# Patient Record
Sex: FEMALE | Race: WHITE | Employment: FULL TIME | URBAN - METROPOLITAN AREA
[De-identification: names, ages, dates, MRNs, and addresses within clinical notes are randomized per-mention and may not be internally consistent; named-entity substitution may affect disease eponyms.]

---

## 2017-12-23 ENCOUNTER — HOSPITAL ENCOUNTER (INPATIENT)
Age: 37
LOS: 5 days | Discharge: NURSING, REHAB OR LONG-TERM CARE | DRG: 030 | End: 2017-12-29
Attending: EMERGENCY MEDICINE | Admitting: NEUROLOGICAL SURGERY
Payer: COMMERCIAL

## 2017-12-23 ENCOUNTER — APPOINTMENT (OUTPATIENT)
Dept: MRI IMAGING | Age: 37
DRG: 030 | End: 2017-12-23
Payer: COMMERCIAL

## 2017-12-23 ENCOUNTER — APPOINTMENT (OUTPATIENT)
Dept: GENERAL RADIOLOGY | Age: 37
DRG: 030 | End: 2017-12-23
Attending: NEUROLOGICAL SURGERY
Payer: COMMERCIAL

## 2017-12-23 ENCOUNTER — ANESTHESIA (OUTPATIENT)
Dept: SURGERY | Age: 37
DRG: 030 | End: 2017-12-23
Payer: COMMERCIAL

## 2017-12-23 ENCOUNTER — ANESTHESIA EVENT (OUTPATIENT)
Dept: SURGERY | Age: 37
DRG: 030 | End: 2017-12-23
Payer: COMMERCIAL

## 2017-12-23 DIAGNOSIS — K59.2 NEUROGENIC BOWEL: ICD-10-CM

## 2017-12-23 DIAGNOSIS — G83.4 CAUDA EQUINA SYNDROME (HCC): ICD-10-CM

## 2017-12-23 DIAGNOSIS — M51.16 LUMBAR DISC HERNIATION WITH RADICULOPATHY: Primary | ICD-10-CM

## 2017-12-23 DIAGNOSIS — G83.4 CAUDA EQUINA SYNDROME WITH NEUROGENIC BLADDER (HCC): ICD-10-CM

## 2017-12-23 LAB — HCG UR QL: NEGATIVE

## 2017-12-23 PROCEDURE — 74011000258 HC RX REV CODE- 258: Performed by: NEUROLOGICAL SURGERY

## 2017-12-23 PROCEDURE — 77030004391 HC BUR FLUT MEDT -C: Performed by: NEUROLOGICAL SURGERY

## 2017-12-23 PROCEDURE — 77030030163 HC BN WAX J&J -A: Performed by: NEUROLOGICAL SURGERY

## 2017-12-23 PROCEDURE — 74011250636 HC RX REV CODE- 250/636: Performed by: ANESTHESIOLOGY

## 2017-12-23 PROCEDURE — 77030018836 HC SOL IRR NACL ICUM -A: Performed by: NEUROLOGICAL SURGERY

## 2017-12-23 PROCEDURE — 0SB20ZZ EXCISION OF LUMBAR VERTEBRAL DISC, OPEN APPROACH: ICD-10-PCS | Performed by: NEUROLOGICAL SURGERY

## 2017-12-23 PROCEDURE — 77030026224 HC KNF DSCTMY MTRX MEDT -D: Performed by: NEUROLOGICAL SURGERY

## 2017-12-23 PROCEDURE — 74011250636 HC RX REV CODE- 250/636

## 2017-12-23 PROCEDURE — 81025 URINE PREGNANCY TEST: CPT

## 2017-12-23 PROCEDURE — 77030008703 HC TU ET UNCUF COVD -A: Performed by: ANESTHESIOLOGY

## 2017-12-23 PROCEDURE — 74011250637 HC RX REV CODE- 250/637: Performed by: NEUROLOGICAL SURGERY

## 2017-12-23 PROCEDURE — 76060000034 HC ANESTHESIA 1.5 TO 2 HR: Performed by: NEUROLOGICAL SURGERY

## 2017-12-23 PROCEDURE — 76010000161 HC OR TIME 1 TO 1.5 HR INTENSV-TIER 1: Performed by: NEUROLOGICAL SURGERY

## 2017-12-23 PROCEDURE — 74011000250 HC RX REV CODE- 250: Performed by: NEUROLOGICAL SURGERY

## 2017-12-23 PROCEDURE — 74011250636 HC RX REV CODE- 250/636: Performed by: PHYSICIAN ASSISTANT

## 2017-12-23 PROCEDURE — 77030027138 HC INCENT SPIROMETER -A

## 2017-12-23 PROCEDURE — 77030003666 HC NDL SPINAL BD -A: Performed by: NEUROLOGICAL SURGERY

## 2017-12-23 PROCEDURE — 96374 THER/PROPH/DIAG INJ IV PUSH: CPT | Performed by: PHYSICIAN ASSISTANT

## 2017-12-23 PROCEDURE — 77030010507 HC ADH SKN DERMBND J&J -B: Performed by: NEUROLOGICAL SURGERY

## 2017-12-23 PROCEDURE — 74011000250 HC RX REV CODE- 250

## 2017-12-23 PROCEDURE — 72148 MRI LUMBAR SPINE W/O DYE: CPT

## 2017-12-23 PROCEDURE — 77030011640 HC PAD GRND REM COVD -A: Performed by: NEUROLOGICAL SURGERY

## 2017-12-23 PROCEDURE — 74011000272 HC RX REV CODE- 272: Performed by: NEUROLOGICAL SURGERY

## 2017-12-23 PROCEDURE — 99285 EMERGENCY DEPT VISIT HI MDM: CPT | Performed by: PHYSICIAN ASSISTANT

## 2017-12-23 PROCEDURE — 74011250636 HC RX REV CODE- 250/636: Performed by: NEUROLOGICAL SURGERY

## 2017-12-23 PROCEDURE — 77030008477 HC STYL SATN SLP COVD -A: Performed by: ANESTHESIOLOGY

## 2017-12-23 PROCEDURE — 77030032490 HC SLV COMPR SCD KNE COVD -B: Performed by: NEUROLOGICAL SURGERY

## 2017-12-23 PROCEDURE — 76210000006 HC OR PH I REC 0.5 TO 1 HR: Performed by: NEUROLOGICAL SURGERY

## 2017-12-23 PROCEDURE — 77030003029 HC SUT VCRL J&J -B: Performed by: NEUROLOGICAL SURGERY

## 2017-12-23 RX ORDER — MORPHINE SULFATE 10 MG/ML
INJECTION, SOLUTION INTRAMUSCULAR; INTRAVENOUS AS NEEDED
Status: DISCONTINUED | OUTPATIENT
Start: 2017-12-23 | End: 2017-12-23 | Stop reason: HOSPADM

## 2017-12-23 RX ORDER — DEXTROSE, SODIUM CHLORIDE, AND POTASSIUM CHLORIDE 5; .45; .15 G/100ML; G/100ML; G/100ML
75 INJECTION INTRAVENOUS CONTINUOUS
Status: DISCONTINUED | OUTPATIENT
Start: 2017-12-23 | End: 2017-12-29 | Stop reason: HOSPADM

## 2017-12-23 RX ORDER — PROPOFOL 10 MG/ML
INJECTION, EMULSION INTRAVENOUS AS NEEDED
Status: DISCONTINUED | OUTPATIENT
Start: 2017-12-23 | End: 2017-12-23 | Stop reason: HOSPADM

## 2017-12-23 RX ORDER — SODIUM CHLORIDE 0.9 % (FLUSH) 0.9 %
5-10 SYRINGE (ML) INJECTION EVERY 8 HOURS
Status: DISCONTINUED | OUTPATIENT
Start: 2017-12-23 | End: 2017-12-29 | Stop reason: HOSPADM

## 2017-12-23 RX ORDER — MIDAZOLAM HYDROCHLORIDE 1 MG/ML
2 INJECTION, SOLUTION INTRAMUSCULAR; INTRAVENOUS ONCE
Status: COMPLETED | OUTPATIENT
Start: 2017-12-23 | End: 2017-12-23

## 2017-12-23 RX ORDER — LIDOCAINE HYDROCHLORIDE 20 MG/ML
INJECTION, SOLUTION EPIDURAL; INFILTRATION; INTRACAUDAL; PERINEURAL AS NEEDED
Status: DISCONTINUED | OUTPATIENT
Start: 2017-12-23 | End: 2017-12-23 | Stop reason: HOSPADM

## 2017-12-23 RX ORDER — MORPHINE SULFATE 10 MG/ML
10 INJECTION, SOLUTION INTRAMUSCULAR; INTRAVENOUS
Status: DISCONTINUED | OUTPATIENT
Start: 2017-12-23 | End: 2017-12-29 | Stop reason: HOSPADM

## 2017-12-23 RX ORDER — CLONIDINE HYDROCHLORIDE 0.1 MG/1
0.1 TABLET ORAL
Status: DISCONTINUED | OUTPATIENT
Start: 2017-12-23 | End: 2017-12-29 | Stop reason: HOSPADM

## 2017-12-23 RX ORDER — DIAZEPAM 10 MG/2ML
5 INJECTION INTRAMUSCULAR
Status: COMPLETED | OUTPATIENT
Start: 2017-12-23 | End: 2017-12-23

## 2017-12-23 RX ORDER — SODIUM CHLORIDE 0.9 % (FLUSH) 0.9 %
5-10 SYRINGE (ML) INJECTION AS NEEDED
Status: DISCONTINUED | OUTPATIENT
Start: 2017-12-23 | End: 2017-12-23 | Stop reason: HOSPADM

## 2017-12-23 RX ORDER — DULOXETIN HYDROCHLORIDE 60 MG/1
60 CAPSULE, DELAYED RELEASE ORAL DAILY
COMMUNITY

## 2017-12-23 RX ORDER — GLUCOSAMINE/CHONDR SU A SOD 750-600 MG
TABLET ORAL
COMMUNITY

## 2017-12-23 RX ORDER — ONDANSETRON 2 MG/ML
INJECTION INTRAMUSCULAR; INTRAVENOUS AS NEEDED
Status: DISCONTINUED | OUTPATIENT
Start: 2017-12-23 | End: 2017-12-23 | Stop reason: HOSPADM

## 2017-12-23 RX ORDER — HEPARIN SODIUM 5000 [USP'U]/ML
5000 INJECTION, SOLUTION INTRAVENOUS; SUBCUTANEOUS EVERY 12 HOURS
Status: DISCONTINUED | OUTPATIENT
Start: 2017-12-23 | End: 2017-12-29 | Stop reason: HOSPADM

## 2017-12-23 RX ORDER — DULOXETIN HYDROCHLORIDE 60 MG/1
60 CAPSULE, DELAYED RELEASE ORAL DAILY
Status: DISCONTINUED | OUTPATIENT
Start: 2017-12-24 | End: 2017-12-23 | Stop reason: SDUPTHER

## 2017-12-23 RX ORDER — FAMOTIDINE 20 MG/1
20 TABLET, FILM COATED ORAL EVERY 12 HOURS
Status: DISCONTINUED | OUTPATIENT
Start: 2017-12-23 | End: 2017-12-29 | Stop reason: HOSPADM

## 2017-12-23 RX ORDER — ROCURONIUM BROMIDE 10 MG/ML
INJECTION, SOLUTION INTRAVENOUS AS NEEDED
Status: DISCONTINUED | OUTPATIENT
Start: 2017-12-23 | End: 2017-12-23 | Stop reason: HOSPADM

## 2017-12-23 RX ORDER — LIDOCAINE HYDROCHLORIDE AND EPINEPHRINE 10; 10 MG/ML; UG/ML
INJECTION, SOLUTION INFILTRATION; PERINEURAL AS NEEDED
Status: DISCONTINUED | OUTPATIENT
Start: 2017-12-23 | End: 2017-12-23 | Stop reason: HOSPADM

## 2017-12-23 RX ORDER — OXYCODONE AND ACETAMINOPHEN 10; 325 MG/1; MG/1
1 TABLET ORAL AS NEEDED
Status: DISCONTINUED | OUTPATIENT
Start: 2017-12-23 | End: 2017-12-23 | Stop reason: HOSPADM

## 2017-12-23 RX ORDER — FENTANYL CITRATE 50 UG/ML
INJECTION, SOLUTION INTRAMUSCULAR; INTRAVENOUS AS NEEDED
Status: DISCONTINUED | OUTPATIENT
Start: 2017-12-23 | End: 2017-12-23 | Stop reason: HOSPADM

## 2017-12-23 RX ORDER — SODIUM CHLORIDE, SODIUM LACTATE, POTASSIUM CHLORIDE, CALCIUM CHLORIDE 600; 310; 30; 20 MG/100ML; MG/100ML; MG/100ML; MG/100ML
75 INJECTION, SOLUTION INTRAVENOUS CONTINUOUS
Status: DISCONTINUED | OUTPATIENT
Start: 2017-12-23 | End: 2017-12-23 | Stop reason: HOSPADM

## 2017-12-23 RX ORDER — ONDANSETRON 2 MG/ML
4 INJECTION INTRAMUSCULAR; INTRAVENOUS
Status: DISCONTINUED | OUTPATIENT
Start: 2017-12-23 | End: 2017-12-29 | Stop reason: HOSPADM

## 2017-12-23 RX ORDER — DEXAMETHASONE SODIUM PHOSPHATE 4 MG/ML
INJECTION, SOLUTION INTRA-ARTICULAR; INTRALESIONAL; INTRAMUSCULAR; INTRAVENOUS; SOFT TISSUE AS NEEDED
Status: DISCONTINUED | OUTPATIENT
Start: 2017-12-23 | End: 2017-12-23 | Stop reason: HOSPADM

## 2017-12-23 RX ORDER — HYDROMORPHONE HYDROCHLORIDE 2 MG/ML
0.5 INJECTION, SOLUTION INTRAMUSCULAR; INTRAVENOUS; SUBCUTANEOUS
Status: DISCONTINUED | OUTPATIENT
Start: 2017-12-23 | End: 2017-12-23 | Stop reason: HOSPADM

## 2017-12-23 RX ORDER — ACETAMINOPHEN 325 MG/1
650 TABLET ORAL
Status: DISCONTINUED | OUTPATIENT
Start: 2017-12-23 | End: 2017-12-29 | Stop reason: HOSPADM

## 2017-12-23 RX ORDER — AMOXICILLIN 250 MG
2 CAPSULE ORAL DAILY
Status: DISCONTINUED | OUTPATIENT
Start: 2017-12-24 | End: 2017-12-29 | Stop reason: HOSPADM

## 2017-12-23 RX ORDER — HYDROCODONE BITARTRATE AND ACETAMINOPHEN 10; 325 MG/1; MG/1
1 TABLET ORAL
COMMUNITY

## 2017-12-23 RX ORDER — ASCORBIC ACID 250 MG
TABLET ORAL
COMMUNITY

## 2017-12-23 RX ORDER — OXYCODONE HYDROCHLORIDE 5 MG/1
5 TABLET ORAL
Status: DISCONTINUED | OUTPATIENT
Start: 2017-12-23 | End: 2017-12-23 | Stop reason: HOSPADM

## 2017-12-23 RX ORDER — NALOXONE HYDROCHLORIDE 0.4 MG/ML
0.4 INJECTION, SOLUTION INTRAMUSCULAR; INTRAVENOUS; SUBCUTANEOUS AS NEEDED
Status: DISCONTINUED | OUTPATIENT
Start: 2017-12-23 | End: 2017-12-29 | Stop reason: HOSPADM

## 2017-12-23 RX ORDER — HYDROCODONE BITARTRATE AND ACETAMINOPHEN 10; 325 MG/1; MG/1
1 TABLET ORAL
Status: DISCONTINUED | OUTPATIENT
Start: 2017-12-23 | End: 2017-12-29 | Stop reason: HOSPADM

## 2017-12-23 RX ORDER — SODIUM CHLORIDE 0.9 % (FLUSH) 0.9 %
5-10 SYRINGE (ML) INJECTION AS NEEDED
Status: DISCONTINUED | OUTPATIENT
Start: 2017-12-23 | End: 2017-12-29 | Stop reason: HOSPADM

## 2017-12-23 RX ORDER — NEOSTIGMINE METHYLSULFATE 1 MG/ML
INJECTION INTRAVENOUS AS NEEDED
Status: DISCONTINUED | OUTPATIENT
Start: 2017-12-23 | End: 2017-12-23 | Stop reason: HOSPADM

## 2017-12-23 RX ORDER — SUCCINYLCHOLINE CHLORIDE 20 MG/ML
INJECTION INTRAMUSCULAR; INTRAVENOUS AS NEEDED
Status: DISCONTINUED | OUTPATIENT
Start: 2017-12-23 | End: 2017-12-23 | Stop reason: HOSPADM

## 2017-12-23 RX ORDER — GLYCOPYRROLATE 0.2 MG/ML
INJECTION INTRAMUSCULAR; INTRAVENOUS AS NEEDED
Status: DISCONTINUED | OUTPATIENT
Start: 2017-12-23 | End: 2017-12-23 | Stop reason: HOSPADM

## 2017-12-23 RX ORDER — DULOXETIN HYDROCHLORIDE 60 MG/1
60 CAPSULE, DELAYED RELEASE ORAL
Status: DISCONTINUED | OUTPATIENT
Start: 2017-12-23 | End: 2017-12-29 | Stop reason: HOSPADM

## 2017-12-23 RX ADMIN — FENTANYL CITRATE 100 MCG: 50 INJECTION, SOLUTION INTRAMUSCULAR; INTRAVENOUS at 16:44

## 2017-12-23 RX ADMIN — SODIUM CHLORIDE, SODIUM LACTATE, POTASSIUM CHLORIDE, AND CALCIUM CHLORIDE 75 ML/HR: 600; 310; 30; 20 INJECTION, SOLUTION INTRAVENOUS at 16:24

## 2017-12-23 RX ADMIN — GLYCOPYRROLATE 0.3 MG: 0.2 INJECTION INTRAMUSCULAR; INTRAVENOUS at 17:49

## 2017-12-23 RX ADMIN — ROCURONIUM BROMIDE 5 MG: 10 INJECTION, SOLUTION INTRAVENOUS at 16:30

## 2017-12-23 RX ADMIN — MIDAZOLAM HYDROCHLORIDE 2 MG: 1 INJECTION, SOLUTION INTRAMUSCULAR; INTRAVENOUS at 16:35

## 2017-12-23 RX ADMIN — SUCCINYLCHOLINE CHLORIDE 160 MG: 20 INJECTION INTRAMUSCULAR; INTRAVENOUS at 16:44

## 2017-12-23 RX ADMIN — SODIUM CHLORIDE 1000 ML: 900 INJECTION, SOLUTION INTRAVENOUS at 11:36

## 2017-12-23 RX ADMIN — PROPOFOL 200 MG: 10 INJECTION, EMULSION INTRAVENOUS at 16:44

## 2017-12-23 RX ADMIN — DIAZEPAM 5 MG: 5 INJECTION, SOLUTION INTRAMUSCULAR; INTRAVENOUS at 11:36

## 2017-12-23 RX ADMIN — DULOXETINE HYDROCHLORIDE 60 MG: 60 CAPSULE, DELAYED RELEASE ORAL at 22:44

## 2017-12-23 RX ADMIN — ROCURONIUM BROMIDE 30 MG: 10 INJECTION, SOLUTION INTRAVENOUS at 16:52

## 2017-12-23 RX ADMIN — DEXAMETHASONE SODIUM PHOSPHATE 4 MG: 4 INJECTION, SOLUTION INTRA-ARTICULAR; INTRALESIONAL; INTRAMUSCULAR; INTRAVENOUS; SOFT TISSUE at 17:15

## 2017-12-23 RX ADMIN — ONDANSETRON 4 MG: 2 INJECTION INTRAMUSCULAR; INTRAVENOUS at 17:15

## 2017-12-23 RX ADMIN — FAMOTIDINE 20 MG: 20 TABLET, FILM COATED ORAL at 22:44

## 2017-12-23 RX ADMIN — HYDROCODONE BITARTRATE AND ACETAMINOPHEN 1 TABLET: 10; 325 TABLET ORAL at 23:50

## 2017-12-23 RX ADMIN — HEPARIN SODIUM 5000 UNITS: 5000 INJECTION, SOLUTION INTRAVENOUS; SUBCUTANEOUS at 22:44

## 2017-12-23 RX ADMIN — LIDOCAINE HYDROCHLORIDE 100 MG: 20 INJECTION, SOLUTION EPIDURAL; INFILTRATION; INTRACAUDAL; PERINEURAL at 16:44

## 2017-12-23 RX ADMIN — MORPHINE SULFATE 5 MG: 10 INJECTION, SOLUTION INTRAMUSCULAR; INTRAVENOUS at 18:00

## 2017-12-23 RX ADMIN — CLINDAMYCIN PHOSPHATE 900 MG: 150 INJECTION, SOLUTION INTRAVENOUS at 17:04

## 2017-12-23 RX ADMIN — DEXTROSE MONOHYDRATE, SODIUM CHLORIDE, AND POTASSIUM CHLORIDE 75 ML/HR: 50; 4.5; 1.49 INJECTION, SOLUTION INTRAVENOUS at 19:56

## 2017-12-23 RX ADMIN — NEOSTIGMINE METHYLSULFATE 2 MG: 1 INJECTION INTRAVENOUS at 17:49

## 2017-12-23 RX ADMIN — MORPHINE SULFATE 5 MG: 10 INJECTION, SOLUTION INTRAMUSCULAR; INTRAVENOUS at 17:45

## 2017-12-23 NOTE — PERIOP NOTES
TRANSFER - OUT REPORT:    Verbal report given to Karin Dan RN  on Altagracia Wei  being transferred to   for routine progression of care       Report consisted of patients Situation, Background, Assessment and   Recommendations(SBAR). Information from the following report(s) SBAR, OR Summary and MAR was reviewed with the receiving nurse. Lines:   Peripheral IV 12/23/17 Right Antecubital (Active)   Site Assessment Clean, dry, & intact 12/23/2017 11:54 AM   Phlebitis Assessment 0 12/23/2017 11:54 AM   Infiltration Assessment 0 12/23/2017 11:54 AM       Peripheral IV 12/23/17 Right Antecubital (Active)   Site Assessment Clean, dry, & intact 12/23/2017  6:12 PM   Phlebitis Assessment 0 12/23/2017  6:12 PM   Infiltration Assessment 0 12/23/2017  6:12 PM   Dressing Status Clean, dry, & intact 12/23/2017  6:12 PM   Dressing Type Transparent;Tape 12/23/2017  6:12 PM   Hub Color/Line Status Infusing 12/23/2017  6:12 PM        Opportunity for questions and clarification was provided. VTE prophylaxis orders have been written for Jazmine Hernandez. Patient and family given floor number and nurses name. Family updated re: pt status after security code verified.

## 2017-12-23 NOTE — ED TRIAGE NOTES
Pt arrive c/o pain to right lower back that radiates down right leg and left lower back numbness that radiates down left leg. Pt states that started yesterday morning. Pt has hx of back pain no surgeries. Pt states had lower back xrays done yesterday at pcp.  Pt states nothing abnormal.

## 2017-12-23 NOTE — PERIOP NOTES
TRANSFER - IN REPORT:    Verbal report received from PAT Lam on Feliberto Landa  being received from ER for ordered procedure      Report consisted of patients Situation, Background, Assessment and   Recommendations(SBAR). Information from the following report(s) SBAR, MAR and Recent Results was reviewed with the receiving nurse. Opportunity for questions and clarification was provided. Assessment completed upon patients arrival to unit and care assumed.

## 2017-12-23 NOTE — ED PROVIDER NOTES
HPI Comments: Patient is here with severe low back pain that started after she tried to get out of bed yesterday morning. She heard a pop at that time. Since then she has had pain in both legs however the left leg is now numb. She has had urinary incontinence as well that started this am with weakness of the left leg. The pain in the leg went away when the numbness started. This is the first time this has happened. She takes Norco 10 mg tablets at home and did take one at 9 AM this morning when she ate a biscuit for breakfast. 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 She had an MRI in 2015 of her lumbar spine. She has not had any difficulty with bowel movements. She states she has weakness in the left leg with numbness. She is not having chest pain, shortness of breath, swelling of that leg, dizziness, dyspnea on exertion, orthopnea or other symptoms. No other complaints. She is here with her . Patient is a 40 y.o. female presenting with back pain. The history is provided by the patient. Back Pain    This is a new problem. The current episode started yesterday. The problem has been gradually worsening. The problem occurs constantly. The pain is associated with no known injury. The pain is present in the lumbar spine. The quality of the pain is described as stabbing. The pain radiates to the left foot. The pain is at a severity of 8/10. The pain is moderate. Associated symptoms include numbness, bladder incontinence, leg pain, paresthesias, paresis, tingling and weakness. Pertinent negatives include no chest pain, no fever, no weight loss, no headaches, no abdominal pain, no abdominal swelling, no bowel incontinence, no perianal numbness, no dysuria and no pelvic pain. Treatments tried: Norco 10. The treatment provided moderate relief.         Past Medical History:   Diagnosis Date  Asthma     Back pain        Past Surgical History:   Procedure Laterality Date    HX CYST REMOVAL      to groin    HX TYMPANOSTOMY           History reviewed. No pertinent family history. Social History     Social History    Marital status:      Spouse name: N/A    Number of children: N/A    Years of education: N/A     Occupational History    Not on file. Social History Main Topics    Smoking status: Never Smoker    Smokeless tobacco: Current User    Alcohol use No    Drug use: No    Sexual activity: Not on file     Other Topics Concern    Not on file     Social History Narrative    No narrative on file         ALLERGIES: Doxycycline; Pcn [penicillins]; and Percocet [oxycodone-acetaminophen]    Review of Systems   Constitutional: Negative for fever and weight loss. HENT: Negative. Eyes: Negative. Respiratory: Negative. Cardiovascular: Negative. Negative for chest pain. Gastrointestinal: Negative. Negative for abdominal pain and bowel incontinence. Genitourinary: Positive for bladder incontinence. Negative for dysuria and pelvic pain. Musculoskeletal: Positive for back pain. Skin: Negative. Neurological: Positive for tingling, weakness, numbness and paresthesias. Negative for headaches. Psychiatric/Behavioral: Negative. All other systems reviewed and are negative. Vitals:    12/23/17 1030   BP: 124/84   Pulse: (!) 104   Resp: 18   Temp: 99.1 °F (37.3 °C)   SpO2: 96%   Weight: 88.5 kg (195 lb)   Height: 5' 6\" (1.676 m)            Physical Exam   Constitutional: She is oriented to person, place, and time. She appears well-developed and well-nourished. HENT:   Head: Normocephalic and atraumatic. Right Ear: External ear normal.   Left Ear: External ear normal.   Nose: Nose normal.   Mouth/Throat: Oropharynx is clear and moist.   Eyes: Conjunctivae and EOM are normal. Pupils are equal, round, and reactive to light. Neck: Normal range of motion.  Neck supple. Cardiovascular: Normal rate, regular rhythm, normal heart sounds and intact distal pulses. Pulmonary/Chest: Effort normal and breath sounds normal.   Abdominal: Soft. Bowel sounds are normal.   Musculoskeletal: Normal range of motion. Back:    Neurological: She is alert and oriented to person, place, and time. She displays no atrophy and no tremor. No cranial nerve deficit or sensory deficit. She exhibits normal muscle tone. She displays no seizure activity. GCS eye subscore is 4. GCS verbal subscore is 5. GCS motor subscore is 6. Reflex Scores:       Tricep reflexes are 2+ on the right side and 2+ on the left side. Bicep reflexes are 2+ on the right side and 2+ on the left side. Brachioradialis reflexes are 2+ on the right side and 2+ on the left side. Patellar reflexes are 2+ on the right side and 1+ on the left side. Achilles reflexes are 2+ on the right side and 1+ on the left side. Skin: Skin is warm and dry. Psychiatric: She has a normal mood and affect. Her behavior is normal. Judgment and thought content normal.   Nursing note and vitals reviewed. MDM  Number of Diagnoses or Management Options     Amount and/or Complexity of Data Reviewed  Clinical lab tests: ordered and reviewed  Tests in the radiology section of CPT®: reviewed and ordered  Discuss the patient with other providers: yes (Dr. Dayton Collier)    Risk of Complications, Morbidity, and/or Mortality  Presenting problems: moderate  Diagnostic procedures: moderate  Management options: moderate      ED Course       Procedures    12:45 PM Spoke with Dr. Dayton Collier regarding patient. 3:16 PM Spoke with Dr. Ginger Beach, she would a flavio, NPO, and she will have emergency surgery. Patient informed and Nargis Newsome RN at bedside putting a muniz in now. 3:25 PM Dr. Ginger Beach here to see patient. The patient was observed in the ED. Results Reviewed:  MRI LUMB SPINE WO CONT   Final Result   IMPRESSION:   1.  L4-5 disc herniation and extrusion with severe narrowing and compression. 2. L3-4 and L5-S1 disc disease and narrowing as above.

## 2017-12-23 NOTE — ED NOTES
Pt sitting up in chair. Family at bedside. No acute distress noted. Took her home Ayer 10mg as Ok'd by [employer] and states that has helped the pain.

## 2017-12-23 NOTE — PROGRESS NOTES
TRANSFER - IN REPORT:    Verbal report received from Jeniffer Smith RN(name) on Catalina Ludwig  being received from [facility](unit) for routine post - op      Report consisted of patients Situation, Background, Assessment and   Recommendations(SBAR). Information from the following report(s) SBAR, OR Summary, Intake/Output, MAR, Med Rec Status and Cardiac Rhythm SINUS RHYTHM was reviewed with the receiving nurse. Opportunity for questions and clarification was provided. Assessment completed upon patients arrival to unit and care assumed.

## 2017-12-23 NOTE — ANESTHESIA PREPROCEDURE EVALUATION
Anesthetic History   No history of anesthetic complications            Review of Systems / Medical History  Patient summary reviewed and pertinent labs reviewed    Pulmonary            Asthma : well controlled       Neuro/Psych             Comments: Chronic back pain--Norco X 2 yrs;weakness lower extremities starting this am L greater than R Cardiovascular                  Exercise tolerance: >4 METS     GI/Hepatic/Renal  Within defined limits              Endo/Other             Other Findings              Physical Exam    Airway  Mallampati: II  TM Distance: > 6 cm  Neck ROM: normal range of motion   Mouth opening: Normal     Cardiovascular    Rhythm: regular           Dental    Dentition: Caps/crowns     Pulmonary                 Abdominal  GI exam deferred       Other Findings            Anesthetic Plan    ASA: 2, emergent  Anesthesia type: general          Induction: Intravenous and RSI  Anesthetic plan and risks discussed with: Patient

## 2017-12-23 NOTE — ANESTHESIA POSTPROCEDURE EVALUATION
Post-Anesthesia Evaluation and Assessment    Patient: Harvey Jane MRN: [id number]  SSN: [id number]    YOB: 1980  Age: 40 y.o. Sex: female       Cardiovascular Function/Vital Signs  Visit Vitals    /56    Pulse 65    Temp 36.5 °C (97.7 °F)    Resp 16    Ht 5' 6\" (1.676 m)    Wt 88.5 kg (195 lb)    SpO2 95%    BMI 31.47 kg/m2       Patient is status post general anesthesia for Procedure(s):  SPINE LUMBAR LAMINECTOMY MINIMALLY INVASIVE LEFT SIDE. Nausea/Vomiting: None    Postoperative hydration reviewed and adequate. Pain:  Pain Scale 1: Numeric (0 - 10) (12/23/17 1812)  Pain Intensity 1: 0 (12/23/17 1812)   Managed    Neurological Status:   Neuro (WDL): Exceptions to WDL (12/23/17 1812)  Neuro  Neurologic State: Drowsy (12/23/17 1812)  LUE Motor Response: Purposeful;Spontaneous  (12/23/17 1812)  LLE Motor Response: Purposeful;Spontaneous ; Other(comment) (Decreased sensation) (12/23/17 1812)  RUE Motor Response: Purposeful;Spontaneous  (12/23/17 1812)  RLE Motor Response: Purposeful;Spontaneous  (12/23/17 1812)   At baseline    Mental Status and Level of Consciousness: Arousable    Pulmonary Status:   O2 Device: Room air (12/23/17 1812)   Adequate oxygenation and airway patent    Complications related to anesthesia: None    Post-anesthesia assessment completed.  No concerns    Signed By: Elizabeth Gar MD     December 23, 2017

## 2017-12-23 NOTE — H&P
Neurosurgery H&P     Subjective:              Shaniqua Baez is a 40 y.o.  female who presents with a long H/O low back problems. She has a known lumbar disc problem that she has treated conservatively for years. However, yesterday she was getting out of bed and felt a pop in her back. She had severe LBP and BLE pain all day, L>R. This am, she awoke with urinary incontinence, numbness in the LLE in no specific distribution, and weakness in the LLE to the point that she cannot walk. She has also developed numbness in the saddle region and most recently, while in MRI, numbness in the RLE. The pain has involved all muscle groups in BLEs. She did not feel the Wilson going in just prior to this exam.     There are no active problems to display for this patient. Not On File Bshsi       Past Medical History:   Diagnosis Date    Asthma     Back pain            Past Surgical History:   Procedure Laterality Date    HX CYST REMOVAL      to groin    HX TYMPANOSTOMY            Allergies   Allergen Reactions    Doxycycline Nausea and Vomiting    Pcn [Penicillins] Rash    Percocet [Oxycodone-Acetaminophen] Nausea and Vomiting         History reviewed. No pertinent family history. No current facility-administered medications for this encounter. Current Outpatient Prescriptions   Medication Sig    HYDROcodone-acetaminophen (NORCO)  mg tablet Take 1 Tab by mouth.  DULoxetine (CYMBALTA) 60 mg capsule Take 60 mg by mouth daily.  ascorbic acid, vitamin C, (VITAMIN C) 250 mg tablet Take  by mouth.  Biotin 2,500 mcg cap Take  by mouth. Review of Systems: A comprehensive review of systems was negative except for that written in the HPI.       Objective:     /72 (BP 1 Location: Right arm)  Pulse 72  Temp 99.1 °F (37.3 °C)  Resp 16  Ht 5' 6\" (1.676 m)  Wt 88.5 kg (195 lb)  SpO2 98%  BMI 31.47 kg/m2    Physical Exam:   General:  Alert, cooperative, no distress, appears stated age. Eyes:  Conjunctivae/corneas clear. PERRL, EOMs intact. Fundi benign   Ears:  Normal TMs and external ear canals both ears. Nose: Nares normal. Septum midline. Mucosa normal. No drainage or sinus tenderness. Mouth/Throat: Lips, mucosa, and tongue normal. Teeth and gums normal.   Neck: Supple, symmetrical, trachea midline, no adenopathy, thyroid: no enlargment/tenderness/nodules, no carotid bruit and no JVD. Back:   Symmetric, no curvature. ROM normal. No CVA tenderness. Lungs:   Clear to auscultation bilaterally. Heart:  Regular rate and rhythm, S1, S2 normal, no murmur, click, rub or gallop. Abdomen:   Soft, non-tender. Bowel sounds normal. No masses,  No organomegaly. Extremities: Extremities normal, atraumatic, no cyanosis or edema. Pulses: 2+ and symmetric all extremities. Skin: Skin color, texture, turgor normal. No rashes or lesions   Lymph nodes: Cervical, supraclavicular, and axillary nodes normal.   Appearance: The patient is well developed, well nourished, provides a coherent history and is in no acute distress. GCS15, A&Ox3. Speech is fluent and appropriate. Cranial Nerves:   Intact visual fields. Fundi are benign. RAJ, EOM's full, no nystagmus, no ptosis. Facial sensation is normal. Corneal reflexes are intact. Facial movement is symmetric. Hearing is normal bilaterally. Palate is midline with normal sternocleidomastoid and trapezius muscles are normal. Tongue is midline. Motor:  5/5 strength in upper and lower proximal and distal muscles except 0/5 L DF, 4-/5 L PF, and 4+/5 R DF/PF. Normal bulk and tone. No fasciculations. Reflexes:   Deep tendon reflexes 2+/4 and symmetrical in BUEs and absent BLEs. Lange's sign is negative bilaterally. There is no clonus present. Sensory:   Normal to touch, pinprick and vibration except LLE and saddle distribution is very decreased. Gait:  Unable. Tremor:   No tremor noted.    Cerebellar:  No cerebellar signs present. Assessment:     The MRI L-spine reveals a large soft HNP at L4-5 which produces severe stenosis and severe compression of the cauda equina. There is a R L5-S1 HNP which appears chronic in nature. Plan:     I do suspect the L4-5 findings are the etiology of her symptoms. This is a rapidly progressing cauda equina syndrome which requires emergent surgery. She wishes to proceed with surgery, which will involve a L L4-5 discectomy. She understands the nature of the procedure and the risks and benefits including but not limited to death, bleeding, infection, damage to nerves, and CSF leak. We will proceed immediately.

## 2017-12-23 NOTE — OP NOTES
Елена Daniel [id number]  [id number]    1980  40 y.o.  female       Operative Report    Date of Surgery: 12/23/2017     Preoperative Diagnosis: L4-5 HNP WITH ACUTE CAUDA EQUINA SYNDROME    Postoperative Diagnosis: SAME    Surgeon(s) and Role:     * Gabriella Dumas MD - Primary    Anesthesia: General     Procedure:   1. Left L4-L5 partial hemilaminectomy, discectomy, and foraminotomy for decompression of nerves. 2. Microsurgery. 3. Intraoperative fluoroscopic guidance. Procedure in Detail:   After appropriate informed consent was obtained, the patient was taken to the operating suite where satisfactory general endotracheal anesthesia was induced. The patient was then turned into the prone position on the operating table on chest rolls. All pressure points were carefully padded. Perioperative antibiotics were given. The lumbar region was prepped and draped in the usual sterile fashion. Intraoperative fluoroscopy was used to localize the L4-L5 level. The skin was infiltrated with 1% lidocaine with epinephrine. A 16 mm vertical linear incision was then made directly over the L4-L5 level 1.5 cm to the left of midline. Dissection was carried down through the subcutaneous tissue. The fascia was perforated using a K wire. The sequential  dilators were passed over the K wire and were docked on the left L4-L5 laminae under fluoroscopic guidance. A tubular retractor was passed over the dilators and was also docked on the left L4-L5 laminae under fluoroscopic guidance. The retractor was secured firmly in place using the articulating arm. The dilators were removed from the wound. The operating microscope was brought into the field. Next, a small amount of soft tissue was removed from the left L4-L5 laminae. The Midas-Antwan high speed drill and Kerrison rongeur were used to perform a left L4-L5 partial hemilaminectomy.  The ligament was removed with care being given to protection of the underlying neural elements. The left L5 nerve root was identified and was gently mobilized using microsurgical technique. The nerve root was then gently retracted medially, allowing for identification of an enormous free fragment of disc material directly beneath the shoulder of the nerve root, badly compressing the entire thecal sac. This disc was removed in a piecemeal fashion. A small amount of additional loose disc material was removed from the disc space itself. A generous foraminotomy was carried out over the left L5 nerve root. The nerves were then inspected and were found to be completely free. Further microsurgical exploration in the epidural space did not reveal any further evidence of free disc material. The wound was then irrigated copiously with antibiotic solution. Meticulous hemostasis was obtained. The operating microscope was removed from the field. The tubular retractor was removed from the wound. The fascia was closed using a single 0 Vicryl suture. The subcutaneous tissue was closed in a layered fashion. The skin edges were approximated using Dermabond. A sterile dressing was applied overall. The patient was then turned back into the supine position on the stretcher where satisfactory general anesthesia was reversed. The patient was then taken to the recovery room in satisfactory condition. Estimated Blood Loss:   25cc           Specimens:  None    Drains: None                Complications: None    Counts: Sponge and needle counts were correct times two.     Signed By:  Mil Brenner MD     December 23, 2017

## 2017-12-24 PROCEDURE — 74011000302 HC RX REV CODE- 302: Performed by: NEUROLOGICAL SURGERY

## 2017-12-24 PROCEDURE — 65270000029 HC RM PRIVATE

## 2017-12-24 PROCEDURE — 97530 THERAPEUTIC ACTIVITIES: CPT

## 2017-12-24 PROCEDURE — 97162 PT EVAL MOD COMPLEX 30 MIN: CPT

## 2017-12-24 PROCEDURE — 74011250636 HC RX REV CODE- 250/636: Performed by: NEUROLOGICAL SURGERY

## 2017-12-24 PROCEDURE — 97110 THERAPEUTIC EXERCISES: CPT

## 2017-12-24 PROCEDURE — 86580 TB INTRADERMAL TEST: CPT | Performed by: NEUROLOGICAL SURGERY

## 2017-12-24 PROCEDURE — 74011250637 HC RX REV CODE- 250/637: Performed by: NEUROLOGICAL SURGERY

## 2017-12-24 RX ADMIN — HEPARIN SODIUM 5000 UNITS: 5000 INJECTION, SOLUTION INTRAVENOUS; SUBCUTANEOUS at 21:52

## 2017-12-24 RX ADMIN — FAMOTIDINE 20 MG: 20 TABLET, FILM COATED ORAL at 21:52

## 2017-12-24 RX ADMIN — STANDARDIZED SENNA CONCENTRATE AND DOCUSATE SODIUM 1 TABLET: 8.6; 5 TABLET, FILM COATED ORAL at 09:14

## 2017-12-24 RX ADMIN — HEPARIN SODIUM 5000 UNITS: 5000 INJECTION, SOLUTION INTRAVENOUS; SUBCUTANEOUS at 10:43

## 2017-12-24 RX ADMIN — HYDROCODONE BITARTRATE AND ACETAMINOPHEN 1 TABLET: 10; 325 TABLET ORAL at 21:52

## 2017-12-24 RX ADMIN — FAMOTIDINE 20 MG: 20 TABLET, FILM COATED ORAL at 09:14

## 2017-12-24 RX ADMIN — Medication 5 ML: at 21:53

## 2017-12-24 RX ADMIN — Medication 10 ML: at 14:23

## 2017-12-24 RX ADMIN — TUBERCULIN PURIFIED PROTEIN DERIVATIVE 5 UNITS: 5 INJECTION INTRADERMAL at 18:59

## 2017-12-24 RX ADMIN — DULOXETINE HYDROCHLORIDE 60 MG: 60 CAPSULE, DELAYED RELEASE ORAL at 21:52

## 2017-12-24 RX ADMIN — HYDROCODONE BITARTRATE AND ACETAMINOPHEN 1 TABLET: 10; 325 TABLET ORAL at 09:14

## 2017-12-24 RX ADMIN — HYDROCODONE BITARTRATE AND ACETAMINOPHEN 1 TABLET: 10; 325 TABLET ORAL at 14:23

## 2017-12-24 NOTE — PROGRESS NOTES
Skin Jacquelyn Cobian RN. Tattoos on rightupper arm, left upper back, right lower back. Surgical incision mid back. Skin otherwise intact. Family at bedside.

## 2017-12-24 NOTE — PROGRESS NOTES
Pt doing well, pain improved but weakness and numbness persists as expected. Eating, Wilson in. Was able to stand with PT, which she could not do yesterday. She has not yet attempted ambulation. AFVSS  Neuro exam:  0/5 B DF, 4+/5 B PF; numbness in saddle area and distal BLEs L>>R. Wound OK    Stable, a little improvement in cauda equina syndrome postop. However, she will need to have the Wilson in for at least 5 more days given the urinary retention and overflow incontinence preop. The B complete foot drop and the numbness will make ambulating very difficult and will put her at very high risk of falling. Based on all this, she will need inpt status and inpt rehab. I have asked the  to assist with placing her in a rehab facility. Nerve healing from this severe problem will be slow. She will also need B AFO splints, which can be fitted next week.

## 2017-12-24 NOTE — PROGRESS NOTES
Problem: Falls - Risk of  Goal: *Absence of Falls  Document Judith Fall Risk and appropriate interventions in the flowsheet.    Outcome: Progressing Towards Goal  Fall Risk Interventions:  Mobility Interventions: Bed/chair exit alarm, Patient to call before getting OOB         Medication Interventions: Assess postural VS orthostatic hypotension, Patient to call before getting OOB, Teach patient to arise slowly    Elimination Interventions: Call light in reach, Bed/chair exit alarm, Patient to call for help with toileting needs, Toileting schedule/hourly rounds

## 2017-12-24 NOTE — PROGRESS NOTES
Problem: Falls - Risk of  Goal: *Absence of Falls  Document Judith Fall Risk and appropriate interventions in the flowsheet.    Outcome: Progressing Towards Goal  Fall Risk Interventions:  Mobility Interventions: OT consult for ADLs, PT Consult for mobility concerns, PT Consult for assist device competence, Utilize walker, cane, or other assitive device, Strengthening exercises (ROM-active/passive), Patient to call before getting OOB         Medication Interventions: Patient to call before getting OOB, Teach patient to arise slowly, Utilize gait belt for transfers/ambulation, Evaluate medications/consider consulting pharmacy, Assess postural VS orthostatic hypotension    Elimination Interventions: Call light in reach, Elevated toilet seat, Patient to call for help with toileting needs, Toilet paper/wipes in reach, Toileting schedule/hourly rounds

## 2017-12-24 NOTE — PROGRESS NOTES
LMSW received call form physician Dr Cortez Anderson that pt will need rehab at [facility]. Will request [facility] In pt rehab eval and also consider referrals to SNF as appropriate. Will follow up on Tuesday 12/26/17 after Christmas Holiday.

## 2017-12-24 NOTE — PROGRESS NOTES
Problem: Mobility Impaired (Adult and Pediatric)  Goal: *Acute Goals and Plan of Care (Insert Text)  STG:  (1.)Ms. Puja Lam will move from supine to sit and sit to supine , scoot up and down and roll side to side with CONTACT GUARD ASSIST within 3 day(s). (2.)Ms. Puja Lam will transfer from bed to chair and chair to bed with CONTACT GUARD ASSIST using the least restrictive device within 3 day(s). (3.)Ms. Puja Lam will ambulate with CONTACT GUARD ASSIST for 15 feet with the least restrictive device within 3 day(s). (4.)Ms. Puja Lam will perform standing static and dynamic balance activities x 15 minutes with CONTACT GUARD ASSIST to improve safety within 3 day(s). LTG:  (1.)Ms. Puja Lam will move from supine to sit and sit to supine , scoot up and down and roll side to side in bed with STAND BY ASSIST within 7 day(s). (2.)Ms. Puja Lam will transfer from bed to chair and chair to bed with STAND BY ASSIST using the least restrictive device within 7 day(s). (3.)Ms. Puja Lam will ambulate with STAND BY ASSIST for 45 feet with the least restrictive device within 7 day(s).   ________________________________________________________________________________________________      PHYSICAL THERAPY: Daily Note, Treatment Day: Day of Assessment, PM 12/24/2017  INPATIENT: Hospital Day: 2  Payor: Denice Coleman / Plan: [facility] / Product Type: PPO /      NAME/AGE/GENDER: Rubina Prieto is a 40 y.o. female   PRIMARY DIAGNOSIS: BULGING DISC  Cauda equina syndrome with neurogenic bladder (HCC)  Cauda equina syndrome with neurogenic bladder (HCC) <principal problem not specified> <principal problem not specified>  Procedure(s) (LRB):  SPINE LUMBAR LAMINECTOMY MINIMALLY INVASIVE LEFT SIDE (Left)  1 Day Post-Op  ICD-10: Treatment Diagnosis:   · Difficulty in walking, Not elsewhere classified (R26.2)   Precaution/Allergies:  Doxycycline; Pcn [penicillins]; and Percocet [oxycodone-acetaminophen]      ASSESSMENT: Ms. Michael Savage is a 40 y.o. female with cauda equina syndrome and s/p above surgery. 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 She has not ambulated since that time. She is supine on contact and agreeable to therapy. She has 3/5 strength in RLE, 3-/5 strength in LLE (grossly 3+/5 within available range of motion, tested functionally as patient was able to stand with assistance). She exhibits decreased sensation on LLE distally and medially. Difficulty with identifying site of sensation with eyes occluded. She was educated on spinal precautions and transferred to sitting via log roll with minimal assistance. She stood with moderate assistance and treatment initiated to include weight shifts to L side, which patient was able to maintain with slight knee flexion. She took side steps at the edge of the bed with moderate assistance and rolling walker, verbal and manual cues required for safety. She sat with good control and returned to supine with moderate assistance. She was educated on gastrocnemius stretch to perform 2x30' 5x per day to prevent plantarflexion contracture. At this time, she is functioning significantly below her baseline as she was completely independent and working prior to admission. She will require additional rehab at d/c (rigorous rehab) to return to prior level of function. Danae Petersen is currently functioning below her baseline and would benefit from skilled PT during acute care stay to maximize safety and independence with functional mobility. PM Note: Patient laying on R side and agreeable to therapy.  Very motivated to participate and reports frequent stretching of calves today with no increase in pain, however minimal increase in spasms. She transferred to sitting with SBA and then stood with minimal assistance from bed. Able to ambulate 7' with rolling walker and heavy use of hands due to LLE weakness. Noted scissoring gait pattern with LLE and with testing, patient's LLE hip abduction decreased. She continues to exhibit drop foot bilaterally with gait. She ambulated to hard back chair and sat with minimal assist and good control. Performed additional LE exercises sitting in chair with minimal cues on form. She has made great progress this session and is very motivated to participate. She has a good family support, as  has been assisting her with stretching and reports he will assist with prescribed exercises in supine with patient. She will require aggressive therapy at d/c to maximize her rehab potential. Will continue therapy efforts. This section established at most recent assessment   PROBLEM LIST (Impairments causing functional limitations):  1. Decreased Strength  2. Decreased ADL/Functional Activities  3. Decreased Transfer Abilities  4. Decreased Ambulation Ability/Technique  5. Decreased Balance  6. Increased Pain  7. Decreased Knowledge of Precautions  8. Decreased [place] with Home Exercise Program   INTERVENTIONS PLANNED: (Benefits and precautions of physical therapy have been discussed with the patient.)  1. Balance Exercise  2. Bed Mobility  3. Family Education  4. Gait Training  5. Home Exercise Program (HEP)  6. Therapeutic Activites  7. Therapeutic Exercise/Strengthening  8. Transfer Training  9. Patient Education  10. Group Therapy     TREATMENT PLAN: Frequency/Duration: twice daily for duration of hospital stay  Rehabilitation Potential For Stated Goals: Good     RECOMMENDED REHABILITATION/EQUIPMENT: (at time of discharge pending progress): Due to the probability of continued deficits (see above) this patient will likely need continued skilled physical therapy after discharge.   Equipment:    None at this time              HISTORY:   History of Present Injury/Illness (Reason for Referral):  Per MD Note: \"Jazmine Parker is a 40 y.o.  female who presents with a long H/O low back problems. She has a known lumbar disc problem that she has treated conservatively for years. However, yesterday she was getting out of bed and felt a pop in her back. She had severe LBP and BLE pain all day, L>R. This am, she awoke with urinary incontinence, numbness in the LLE in no specific distribution, and weakness in the LLE to the point that she cannot walk. She has also developed numbness in the saddle region and most recently, while in MRI, numbness in the RLE. The pain has involved all muscle groups in BLEs. She did not feel the Wilson going in just prior to this exam.      There are no active problems to display for this patient. \"  Past Medical History/Comorbidities:   Ms. Lars Rojo  has a past medical history of Asthma and Back pain. Ms. Lars Rojo  has a past surgical history that includes hx cyst removal and hx tympanostomy. Social History/Living Environment:   Home Environment: Private residence  # Steps to Enter: 0  One/Two Story Residence: One story  Living Alone: No  Support Systems: Spouse/Significant Other/Partner, Child(mackenzie)  Patient Expects to be Discharged to[de-identified] Unknown  Current DME Used/Available at Home: None  Prior Level of Function/Work/Activity:  Patient independent with all functional mobility at baseline. Number of Personal Factors/Comorbidities that affect the Plan of Care: 1-2: MODERATE COMPLEXITY   EXAMINATION:   Most Recent Physical Functioning:   Gross Assessment:  AROM: Grossly decreased, non-functional  PROM: Within functional limits  Strength: Grossly decreased, non-functional  Coordination: Grossly decreased, non-functional  Sensation: Impaired               Posture:  Posture (WDL): Exceptions to WDL  Posture Assessment:  Forward head, Rounded shoulders  Balance:  Sitting: Intact  Standing: Impaired  Standing - Static: Fair  Standing - Dynamic : Fair (-) Bed Mobility:  Rolling: Stand-by asssistance  Supine to Sit: Stand-by asssistance  Sit to Supine: Moderate assistance  Scooting: Stand-by asssistance  Wheelchair Mobility:     Transfers:  Sit to Stand: Minimum assistance  Stand to Sit: Minimum assistance  Bed to Chair: Minimum assistance  Interventions: Safety awareness training;Verbal cues; Visual cues  Gait:     Base of Support: Center of gravity altered;Narrowed  Speed/Lata: Slow;Shuffled  Step Length: Right shortened;Left shortened  Gait Abnormalities: Foot drop;Decreased step clearance;Trunk sway increased; Path deviations  Distance (ft): 7 Feet (ft)  Assistive Device: Walker, rolling  Ambulation - Level of Assistance: Minimal assistance; Moderate assistance  Interventions: Safety awareness training;Verbal cues; Visual/Demos;Manual cues      Body Structures Involved:  1. Nerves  2. Muscles Body Functions Affected:  1. Sensory/Pain  2. Neuromusculoskeletal  3. Movement Related Activities and Participation Affected:  1. Mobility  2. Self Care  3. Domestic Life  4. Interpersonal Interactions and Relationships  5. Community, Social and [place]   Number of elements that affect the Plan of Care: 4+: HIGH COMPLEXITY   CLINICAL PRESENTATION:   Presentation: Evolving clinical presentation with changing clinical characteristics: MODERATE COMPLEXITY   CLINICAL DECISION MAKIN [facility] Inpatient Short Form  How much difficulty does the patient currently have. .. Unable A Lot A Little None   1. Turning over in bed (including adjusting bedclothes, sheets and blankets)? [] 1   [] 2   [x] 3   [] 4   2. Sitting down on and standing up from a chair with arms ( e.g., wheelchair, bedside commode, etc.)   [] 1   [] 2   [x] 3   [] 4   3. Moving from lying on back to sitting on the side of the bed?    [] 1   [] 2   [x] 3   [] 4   How much help from another person does the patient currently need. .. Total A Lot A Little None   4. Moving to and from a bed to a chair (including a wheelchair)? [] 1   [x] 2   [] 3   [] 4   5. Need to walk in hospital room? [] 1   [x] 2   [] 3   [] 4   6. Climbing 3-5 steps with a railing? [x] 1   [] 2   [] 3   [] 4   © 2007, Trustees of [address] [place] [address] [address] [id number], under license to [employer]. All rights reserved      Score:  Initial: 14 Most Recent: X (Date: -- )    Interpretation of Tool:  Represents activities that are increasingly more difficult (i.e. Bed mobility, Transfers, Gait). Score 24 23 22-20 19-15 14-10 9-7 6     Modifier CH CI CJ CK CL CM CN      ? Mobility - Walking and Moving Around:     - CURRENT STATUS: CL - 60%-79% impaired, limited or restricted    - GOAL STATUS: CK - 40%-59% impaired, limited or restricted    - D/C STATUS:  ---------------To be determined---------------  Payor: BLUE CROSS / Plan: [place] [facility] [facility] / Product Type: PPO /      Medical Necessity:     · Patient demonstrates good rehab potential due to higher previous functional level. Reason for Services/Other Comments:  · Patient continues to require modification of therapeutic interventions to increase complexity of exercises. Use of outcome tool(s) and clinical judgement create a POC that gives a: Questionable prediction of patient's progress: MODERATE COMPLEXITY            TREATMENT:   (In addition to Assessment/Re-Assessment sessions the following treatments were rendered)   Pre-treatment Symptoms/Complaints:  none  Pain: Initial:   Pain Intensity 1: 0  Post Session:  6/10     Therapeutic Activity: (    15 min): Therapeutic activities including bed mobility, chair transfers Ambulation on level ground and weight shifting and standing balance to improve mobility, strength, balance and coordination. Required moderate Safety awareness training;Verbal cues; Visual/Demos;Manual cues to promote static and dynamic balance in standing. Therapeutic Exercise: (  10 minutes):  Exercises per grid below to improve mobility, strength, balance and coordination. Required minimal visual, verbal and manual cues to promote proper body alignment. Progressed complexity of movement as indicated. Date:  12/24/17 Date:   Date:     ACTIVITY/EXERCISE AM PM AM PM AM PM   Seated LAQ with controlled lowering  x10 BLE AROM       Seated hip abduction  x10 BLE AAROM       Seated clam shells with red theraband  x10 BLE AROM                                           sB = bilateral; AA = active assistive; A = active; P = passive      Braces/Orthotics/Lines/Etc:   · O2 Device: Room air  Treatment/Session Assessment:    · Response to Treatment:  Patient tolerated therapy well, however was tearful. · Interdisciplinary Collaboration:   o Physical Therapist  o Registered Nurse  · After treatment position/precautions:   o Up in chair  o Bed/Chair-wheels locked  o Bed in low position  o Call light within reach  o RN notified  o Family at bedside   · Compliance with Program/Exercises: Will assess as treatment progresses. · Recommendations/Intent for next treatment session: \"Next visit will focus on advancements to more challenging activities and reduction in assistance provided\".   Total Treatment Duration:  PT Patient Time In/Time Out  Time In: 1620  Time Out: [address], DPT

## 2017-12-24 NOTE — PROGRESS NOTES
Problem: Mobility Impaired (Adult and Pediatric)  Goal: *Acute Goals and Plan of Care (Insert Text)  STG:  (1.)Ms. Jose Solomon will move from supine to sit and sit to supine , scoot up and down and roll side to side with CONTACT GUARD ASSIST within 3 day(s). (2.)Ms. Jose Solomon will transfer from bed to chair and chair to bed with CONTACT GUARD ASSIST using the least restrictive device within 3 day(s). (3.)Ms. Jose Solomon will ambulate with CONTACT GUARD ASSIST for 15 feet with the least restrictive device within 3 day(s). (4.)Ms. Jose Solomon will perform standing static and dynamic balance activities x 15 minutes with CONTACT GUARD ASSIST to improve safety within 3 day(s). LTG:  (1.)Ms. Jose Solomon will move from supine to sit and sit to supine , scoot up and down and roll side to side in bed with STAND BY ASSIST within 7 day(s). (2.)Ms. Jose Solomon will transfer from bed to chair and chair to bed with STAND BY ASSIST using the least restrictive device within 7 day(s). (3.)Ms. Jose Solomon will ambulate with STAND BY ASSIST for 45 feet with the least restrictive device within 7 day(s).   ________________________________________________________________________________________________      PHYSICAL THERAPY: Initial Assessment, Daily Note, AM 12/24/2017  OUTPATIENT: Hospital Day: 2  Payor: Bj Matias / Plan: [facility] / Product Type: PPO /      NAME/AGE/GENDER: Feliberto Landa is a 40 y.o. female   PRIMARY DIAGNOSIS: BULGING DISC  Cauda equina syndrome with neurogenic bladder (HCC) <principal problem not specified> <principal problem not specified>  Procedure(s) (LRB):  SPINE LUMBAR LAMINECTOMY MINIMALLY INVASIVE LEFT SIDE (Left)  1 Day Post-Op  ICD-10: Treatment Diagnosis:   · Difficulty in walking, Not elsewhere classified (R26.2)   Precaution/Allergies:  Doxycycline; Pcn [penicillins]; and Percocet [oxycodone-acetaminophen]      ASSESSMENT:     Ms. Jose Solomon is a 40 y.o. female with cauda equina syndrome and s/p above surgery. 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 She has not ambulated since that time. She is supine on contact and agreeable to therapy. She has 3/5 strength in RLE, 3-/5 strength in LLE (grossly 3+/5 within available range of motion, tested functionally as patient was able to stand with assistance). She exhibits decreased sensation on LLE distally and medially. Difficulty with identifying site of sensation with eyes occluded. She was educated on spinal precautions and transferred to sitting via log roll with minimal assistance. She stood with moderate assistance and treatment initiated to include weight shifts to L side, which patient was able to maintain with slight knee flexion. She took side steps at the edge of the bed with moderate assistance and rolling walker, verbal and manual cues required for safety. She sat with good control and returned to supine with moderate assistance. She was educated on gastrocnemius stretch to perform 2x30' 5x per day to prevent plantarflexion contracture. At this time, she is functioning significantly below her baseline as she was completely independent and working prior to admission. She will require additional rehab at d/c (rigorous rehab) to return to prior level of function. Geri Baker is currently functioning below her baseline and would benefit from skilled PT during acute care stay to maximize safety and independence with functional mobility. This section established at most recent assessment   PROBLEM LIST (Impairments causing functional limitations):  1. Decreased Strength  2. Decreased ADL/Functional Activities  3. Decreased Transfer Abilities  4. Decreased Ambulation Ability/Technique  5.  Decreased Balance  6. Increased Pain  7. Decreased Knowledge of Precautions  8. Decreased [place] with Home Exercise Program   INTERVENTIONS PLANNED: (Benefits and precautions of physical therapy have been discussed with the patient.)  1. Balance Exercise  2. Bed Mobility  3. Family Education  4. Gait Training  5. Home Exercise Program (HEP)  6. Therapeutic Activites  7. Therapeutic Exercise/Strengthening  8. Transfer Training  9. Patient Education  10. Group Therapy     TREATMENT PLAN: Frequency/Duration: twice daily for duration of hospital stay  Rehabilitation Potential For Stated Goals: Good     RECOMMENDED REHABILITATION/EQUIPMENT: (at time of discharge pending progress): Due to the probability of continued deficits (see above) this patient will likely need continued skilled physical therapy after discharge. Equipment:    None at this time              HISTORY:   History of Present Injury/Illness (Reason for Referral):  Per MD Note: Allie Tejeda is a 40 y.o.  female who presents with a long H/O low back problems. She has a known lumbar disc problem that she has treated conservatively for years. However, yesterday she was getting out of bed and felt a pop in her back. She had severe LBP and BLE pain all day, L>R. This am, she awoke with urinary incontinence, numbness in the LLE in no specific distribution, and weakness in the LLE to the point that she cannot walk. She has also developed numbness in the saddle region and most recently, while in MRI, numbness in the RLE. The pain has involved all muscle groups in BLEs. She did not feel the Wilson going in just prior to this exam.      There are no active problems to display for this patient. \"  Past Medical History/Comorbidities:   Ms. Christo Tejeda  has a past medical history of Asthma and Back pain. Ms. Christo Tejeda  has a past surgical history that includes hx cyst removal and hx tympanostomy.   Social History/Living Environment:   Home Environment: Private residence  # Steps to Enter: 0  One/Two Story Residence: One story  Living Alone: No  Support Systems: Spouse/Significant Other/Partner, Child(mackenzie)  Patient Expects to be Discharged to[de-identified] Unknown  Current DME Used/Available at Home: None  Prior Level of Function/Work/Activity:  Patient independent with all functional mobility at baseline. Number of Personal Factors/Comorbidities that affect the Plan of Care: 1-2: MODERATE COMPLEXITY   EXAMINATION:   Most Recent Physical Functioning:   Gross Assessment:  AROM: Grossly decreased, non-functional  PROM: Within functional limits  Strength: Grossly decreased, non-functional  Coordination: Grossly decreased, non-functional  Sensation: Impaired               Posture:  Posture (WDL): Exceptions to WDL  Posture Assessment: Forward head, Rounded shoulders  Balance:  Sitting: Intact  Standing: Impaired  Standing - Static: Fair  Standing - Dynamic : Poor Bed Mobility:  Rolling: Contact guard assistance  Supine to Sit: Minimum assistance  Sit to Supine: Moderate assistance  Scooting: Contact guard assistance  Wheelchair Mobility:     Transfers:  Sit to Stand: Minimum assistance; Moderate assistance  Stand to Sit: Minimum assistance; Moderate assistance  Gait:     Base of Support: Narrowed; Center of gravity altered  Speed/Lata: Slow  Step Length: Left shortened;Right shortened  Gait Abnormalities: Foot drop;Trunk sway increased  Distance (ft): 2 Feet (ft) (side steps)  Assistive Device: Walker, rolling  Ambulation - Level of Assistance: Moderate assistance  Interventions: Safety awareness training;Manual cues; Verbal cues      Body Structures Involved:  1. Nerves  2. Muscles Body Functions Affected:  1. Sensory/Pain  2. Neuromusculoskeletal  3. Movement Related Activities and Participation Affected:  1. Mobility  2. Self Care  3. Domestic Life  4. Interpersonal Interactions and Relationships  5.  Community, Social and [place]   Number of elements that affect the Plan of Care: 4+: HIGH COMPLEXITY   CLINICAL PRESENTATION:   Presentation: Evolving clinical presentation with changing clinical characteristics: MODERATE COMPLEXITY   CLINICAL DECISION MAKIN [facility] Mobility Inpatient Short Form  How much difficulty does the patient currently have. .. Unable A Lot A Little None   1. Turning over in bed (including adjusting bedclothes, sheets and blankets)? [] 1   [] 2   [x] 3   [] 4   2. Sitting down on and standing up from a chair with arms ( e.g., wheelchair, bedside commode, etc.)   [] 1   [] 2   [x] 3   [] 4   3. Moving from lying on back to sitting on the side of the bed? [] 1   [] 2   [x] 3   [] 4   How much help from another person does the patient currently need. .. Total A Lot A Little None   4. Moving to and from a bed to a chair (including a wheelchair)? [] 1   [x] 2   [] 3   [] 4   5. Need to walk in hospital room? [] 1   [x] 2   [] 3   [] 4   6. Climbing 3-5 steps with a railing? [x] 1   [] 2   [] 3   [] 4   © , Trustees of [address] [place] [address], under license to [employer]. All rights reserved      Score:  Initial: 14 Most Recent: X (Date: -- )    Interpretation of Tool:  Represents activities that are increasingly more difficult (i.e. Bed mobility, Transfers, Gait). Score 24 23 22-20 19-15 14-10 9-7 6     Modifier CH CI CJ CK CL CM CN      ? Mobility - Walking and Moving Around:     - CURRENT STATUS: CL - 60%-79% impaired, limited or restricted    - GOAL STATUS: CK - 40%-59% impaired, limited or restricted    - D/C STATUS:  ---------------To be determined---------------  Payor: BLUE CROSS / Plan: [place] CROSS [facility] / Product Type: PPO /      Medical Necessity:     · Patient demonstrates good rehab potential due to higher previous functional level. Reason for Services/Other Comments:  · Patient continues to require modification of therapeutic interventions to increase complexity of exercises. Use of outcome tool(s) and clinical judgement create a POC that gives a: Questionable prediction of patient's progress: MODERATE COMPLEXITY            TREATMENT:   (In addition to Assessment/Re-Assessment sessions the following treatments were rendered)   Pre-treatment Symptoms/Complaints:  none  Pain: Initial:   Pain Intensity 1: 6  Post Session:  6/10     Therapeutic Activity: (    10): Therapeutic activities including Ambulation on level ground and weight shifting and standing balance to improve mobility, strength, balance and coordination. Required moderate Safety awareness training;Manual cues; Verbal cues to promote static and dynamic balance in standing. Braces/Orthotics/Lines/Etc:   · IV  · O2 Device: Room air  Treatment/Session Assessment:    · Response to Treatment:  Patient tolerated therapy well, however was tearful. · Interdisciplinary Collaboration:   o Physical Therapist  o Registered Nurse  · After treatment position/precautions:   o Supine in bed  o Bed/Chair-wheels locked  o Bed in low position  o Call light within reach  o RN notified  o Family at bedside   · Compliance with Program/Exercises: Will assess as treatment progresses. · Recommendations/Intent for next treatment session: \"Next visit will focus on advancements to more challenging activities and reduction in assistance provided\".   Total Treatment Duration:  PT Patient Time In/Time Out  Time In: 0929  Time Out: 9040 Libia Butler DPT

## 2017-12-25 LAB
MM INDURATION POC: 0 MM (ref 0–5)
MM INDURATION POC: NORMAL MM (ref 0–5)
PPD POC: NORMAL NEGATIVE
PPD POC: NORMAL NEGATIVE

## 2017-12-25 PROCEDURE — 65270000029 HC RM PRIVATE

## 2017-12-25 PROCEDURE — 99253 IP/OBS CNSLTJ NEW/EST LOW 45: CPT | Performed by: PHYSICAL MEDICINE & REHABILITATION

## 2017-12-25 PROCEDURE — 74011250636 HC RX REV CODE- 250/636: Performed by: NEUROLOGICAL SURGERY

## 2017-12-25 PROCEDURE — 97110 THERAPEUTIC EXERCISES: CPT

## 2017-12-25 PROCEDURE — 74011250637 HC RX REV CODE- 250/637: Performed by: NEUROLOGICAL SURGERY

## 2017-12-25 PROCEDURE — 97116 GAIT TRAINING THERAPY: CPT

## 2017-12-25 PROCEDURE — 74011250637 HC RX REV CODE- 250/637: Performed by: PHYSICAL MEDICINE & REHABILITATION

## 2017-12-25 RX ORDER — ADHESIVE BANDAGE
30 BANDAGE TOPICAL DAILY PRN
Status: DISCONTINUED | OUTPATIENT
Start: 2017-12-25 | End: 2017-12-29 | Stop reason: HOSPADM

## 2017-12-25 RX ORDER — POLYETHYLENE GLYCOL 3350 17 G/17G
17 POWDER, FOR SOLUTION ORAL DAILY
Status: DISCONTINUED | OUTPATIENT
Start: 2017-12-25 | End: 2017-12-29 | Stop reason: HOSPADM

## 2017-12-25 RX ORDER — FACIAL-BODY WIPES
10 EACH TOPICAL DAILY PRN
Status: DISCONTINUED | OUTPATIENT
Start: 2017-12-25 | End: 2017-12-29 | Stop reason: HOSPADM

## 2017-12-25 RX ADMIN — Medication 5 ML: at 15:28

## 2017-12-25 RX ADMIN — HYDROCODONE BITARTRATE AND ACETAMINOPHEN 1 TABLET: 10; 325 TABLET ORAL at 07:51

## 2017-12-25 RX ADMIN — HYDROCODONE BITARTRATE AND ACETAMINOPHEN 1 TABLET: 10; 325 TABLET ORAL at 14:34

## 2017-12-25 RX ADMIN — FAMOTIDINE 20 MG: 20 TABLET, FILM COATED ORAL at 07:51

## 2017-12-25 RX ADMIN — POLYETHYLENE GLYCOL (3350) 17 G: 17 POWDER, FOR SOLUTION ORAL at 10:28

## 2017-12-25 RX ADMIN — STANDARDIZED SENNA CONCENTRATE AND DOCUSATE SODIUM 2 TABLET: 8.6; 5 TABLET, FILM COATED ORAL at 07:51

## 2017-12-25 RX ADMIN — FAMOTIDINE 20 MG: 20 TABLET, FILM COATED ORAL at 21:03

## 2017-12-25 RX ADMIN — HEPARIN SODIUM 5000 UNITS: 5000 INJECTION, SOLUTION INTRAVENOUS; SUBCUTANEOUS at 21:03

## 2017-12-25 RX ADMIN — HYDROCODONE BITARTRATE AND ACETAMINOPHEN 1 TABLET: 10; 325 TABLET ORAL at 21:03

## 2017-12-25 RX ADMIN — DULOXETINE HYDROCHLORIDE 60 MG: 60 CAPSULE, DELAYED RELEASE ORAL at 21:03

## 2017-12-25 RX ADMIN — HEPARIN SODIUM 5000 UNITS: 5000 INJECTION, SOLUTION INTRAVENOUS; SUBCUTANEOUS at 07:51

## 2017-12-25 RX ADMIN — Medication 10 ML: at 21:04

## 2017-12-25 NOTE — PROGRESS NOTES
Problem: Falls - Risk of  Goal: *Absence of Falls  Document Judith Fall Risk and appropriate interventions in the flowsheet.    Outcome: Progressing Towards Goal  Fall Risk Interventions:  Mobility Interventions: Bed/chair exit alarm, OT consult for ADLs, Patient to call before getting OOB, PT Consult for mobility concerns, PT Consult for assist device competence, Strengthening exercises (ROM-active/passive), Utilize walker, cane, or other assitive device         Medication Interventions: Assess postural VS orthostatic hypotension, Bed/chair exit alarm, Patient to call before getting OOB, Teach patient to arise slowly    Elimination Interventions: Bed/chair exit alarm, Call light in reach, Patient to call for help with toileting needs, Toilet paper/wipes in reach

## 2017-12-25 NOTE — PROGRESS NOTES
Pt doing well, sensation a little better but still not normal.  Walked some with walker and bands today. Pain well controlled. Steve in. AFVSS  Neuro exam unchanged  Wound OK    Stable; plan Rehab Wed. Appreciate Dr. Dudley Ricardo' assistance.

## 2017-12-25 NOTE — PROGRESS NOTES
Problem: Mobility Impaired (Adult and Pediatric)  Goal: *Acute Goals and Plan of Care (Insert Text)  STG:  (1.)Ms. Reena Ramirez will move from supine to sit and sit to supine , scoot up and down and roll side to side with CONTACT GUARD ASSIST within 3 day(s). (2.)Ms. Reena Ramirez will transfer from bed to chair and chair to bed with CONTACT GUARD ASSIST using the least restrictive device within 3 day(s). (3.)Ms. Reena Ramirez will ambulate with CONTACT GUARD ASSIST for 15 feet with the least restrictive device within 3 day(s). (4.)Ms. Reena Ramirez will perform standing static and dynamic balance activities x 15 minutes with CONTACT GUARD ASSIST to improve safety within 3 day(s). LTG:  (1.)Ms. Reena Ramirez will move from supine to sit and sit to supine , scoot up and down and roll side to side in bed with STAND BY ASSIST within 7 day(s). (2.)Ms. Reena Ramirez will transfer from bed to chair and chair to bed with STAND BY ASSIST using the least restrictive device within 7 day(s). (3.)Ms. Reena Ramirez will ambulate with STAND BY ASSIST for 45 feet with the least restrictive device within 7 day(s).   ________________________________________________________________________________________________      PHYSICAL THERAPY: Daily Note, Treatment Day: 1st, AM 12/25/2017  INPATIENT: Hospital Day: 3  Payor: Chase Kemp / Plan: [facility] / Product Type: PPO /      NAME/AGE/GENDER: Caesar Fernandez is a 40 y.o. female   PRIMARY DIAGNOSIS: BULGING DISC  Cauda equina syndrome with neurogenic bladder (HCC)  Cauda equina syndrome with neurogenic bladder ([address] <principal problem not specified> <principal problem not specified>  Procedure(s) (LRB):  SPINE LUMBAR LAMINECTOMY MINIMALLY INVASIVE LEFT SIDE (Left)  2 Days Post-Op  ICD-10: Treatment Diagnosis:   · Difficulty in walking, Not elsewhere classified (R26.2)   Precaution/Allergies:  Doxycycline; Pcn [penicillins]; and Percocet [oxycodone-acetaminophen]      ASSESSMENT:     Ms. Reena Ramirez is a 40 y.o. female with cauda equina syndrome and s/p above surgery. 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 She has not ambulated since that time. She is supine on contact and agreeable to therapy. She has 3/5 strength in RLE, 3-/5 strength in LLE (grossly 3+/5 within available range of motion, tested functionally as patient was able to stand with assistance). She exhibits decreased sensation on LLE distally and medially. Difficulty with identifying site of sensation with eyes occluded. She was sitting in chair on contact and agreeable to therapy. Applied manual assistance to BLE with theraband to place patient in increased dorsiflexion to assist with normalized gait pattern during gait as she continues to have trace muscle strength in BLE dorsiflexion. She ambulated 48' with minimal assistance and rolling walker to perform gait training, requiring cues to attempt heel strike during gait. One loss of balance when turning with rolling walker. She returned to room to sit in [address] [place] [address] chair to perform LE strengthening exercises. Appears very motivated today and compliant with all recommendations outside of therapy. She made excellent progress this session with less assistance required and increased gait distance. At this time, she is functioning significantly below her baseline as she was completely independent and working prior to admission. She will require additional rehab at d/c (rigorous rehab) to return to prior level of function. Estefanía Ron is currently functioning below her baseline and would benefit from skilled PT during acute care stay to maximize safety and independence with functional mobility.     This section established at most recent assessment PROBLEM LIST (Impairments causing functional limitations):  1. Decreased Strength  2. Decreased ADL/Functional Activities  3. Decreased Transfer Abilities  4. Decreased Ambulation Ability/Technique  5. Decreased Balance  6. Increased Pain  7. Decreased Knowledge of Precautions  8. Decreased [place] with Home Exercise Program   INTERVENTIONS PLANNED: (Benefits and precautions of physical therapy have been discussed with the patient.)  1. Balance Exercise  2. Bed Mobility  3. Family Education  4. Gait Training  5. Home Exercise Program (HEP)  6. Therapeutic Activites  7. Therapeutic Exercise/Strengthening  8. Transfer Training  9. Patient Education  10. Group Therapy     TREATMENT PLAN: Frequency/Duration: twice daily for duration of hospital stay  Rehabilitation Potential For Stated Goals: Good     RECOMMENDED REHABILITATION/EQUIPMENT: (at time of discharge pending progress): Due to the probability of continued deficits (see above) this patient will likely need continued skilled physical therapy after discharge. Equipment:    None at this time              HISTORY:   History of Present Injury/Illness (Reason for Referral):  Per MD Note: Nannette Tolbert is a 40 y.o.  female who presents with a long H/O low back problems. She has a known lumbar disc problem that she has treated conservatively for years. However, yesterday she was getting out of bed and felt a pop in her back. She had severe LBP and BLE pain all day, L>R. This am, she awoke with urinary incontinence, numbness in the LLE in no specific distribution, and weakness in the LLE to the point that she cannot walk. She has also developed numbness in the saddle region and most recently, while in MRI, numbness in the RLE. The pain has involved all muscle groups in BLEs. She did not feel the Wilson going in just prior to this exam.      There are no active problems to display for this patient. \"  Past Medical History/Comorbidities:   Ms. Inez Lechuga  has a past medical history of Asthma and Back pain. Ms. Inez Lechuga  has a past surgical history that includes hx cyst removal and hx tympanostomy. Social History/Living Environment:   Home Environment: Private residence  # Steps to Enter: 0  One/Two Story Residence: One story  Living Alone: No  Support Systems: Spouse/Significant Other/Partner, Child(mackenzie)  Patient Expects to be Discharged to[de-identified] Unknown  Current DME Used/Available at Home: None  Prior Level of Function/Work/Activity:  Patient independent with all functional mobility at baseline. Number of Personal Factors/Comorbidities that affect the Plan of Care: 1-2: MODERATE COMPLEXITY   EXAMINATION:   Most Recent Physical Functioning:   Gross Assessment:  AROM: Grossly decreased, non-functional  PROM: Within functional limits  Strength: Grossly decreased, non-functional  Coordination: Grossly decreased, non-functional  Sensation: Impaired               Posture:  Posture (WDL): Exceptions to WDL  Posture Assessment: Forward head, Rounded shoulders  Balance:  Sitting: Intact  Standing: Impaired  Standing - Static: Fair  Standing - Dynamic : Fair Bed Mobility:     Wheelchair Mobility:     Transfers:  Sit to Stand: Contact guard assistance  Stand to Sit: Contact guard assistance  Interventions: Safety awareness training;Verbal cues; Visual cues  Gait:     Base of Support: Center of gravity altered; Widened  Speed/Lata: Slow;Shuffled;Pace decreased (<100 feet/min)  Step Length: Left shortened;Right shortened  Gait Abnormalities: Decreased step clearance; Path deviations;Trunk sway increased; Foot drop  Distance (ft): 50 Feet (ft)  Assistive Device: Walker, rolling  Ambulation - Level of Assistance: Minimal assistance  Interventions: Safety awareness training;Verbal cues; Visual/Demos;Manual cues      Body Structures Involved:  1. Nerves  2. Muscles Body Functions Affected:  1. Sensory/Pain  2. Neuromusculoskeletal  3.  Movement Related Activities and Participation Affected:  1. Mobility  2. Self Care  3. Domestic Life  4. Interpersonal Interactions and Relationships  5. Community, Social and [place]   Number of elements that affect the Plan of Care: 4+: HIGH COMPLEXITY   CLINICAL PRESENTATION:   Presentation: Evolving clinical presentation with changing clinical characteristics: MODERATE COMPLEXITY   CLINICAL DECISION MAKIN [facility] Mobility Inpatient Short Form  How much difficulty does the patient currently have. .. Unable A Lot A Little None   1. Turning over in bed (including adjusting bedclothes, sheets and blankets)? [] 1   [] 2   [x] 3   [] 4   2. Sitting down on and standing up from a chair with arms ( e.g., wheelchair, bedside commode, etc.)   [] 1   [] 2   [x] 3   [] 4   3. Moving from lying on back to sitting on the side of the bed? [] 1   [] 2   [x] 3   [] 4   How much help from another person does the patient currently need. .. Total A Lot A Little None   4. Moving to and from a bed to a chair (including a wheelchair)? [] 1   [x] 2   [] 3   [] 4   5. Need to walk in hospital room? [] 1   [x] 2   [] 3   [] 4   6. Climbing 3-5 steps with a railing? [x] 1   [] 2   [] 3   [] 4   © , Trustees of [address] [place] [address], under license to [employer]. All rights reserved      Score:  Initial: 14 Most Recent: X (Date: -- )    Interpretation of Tool:  Represents activities that are increasingly more difficult (i.e. Bed mobility, Transfers, Gait). Score 24 23 22-20 19-15 14-10 9-7 6     Modifier CH CI CJ CK CL CM CN      ?  Mobility - Walking and Moving Around:     - CURRENT STATUS: CL - 60%-79% impaired, limited or restricted    - GOAL STATUS: CK - 40%-59% impaired, limited or restricted    - D/C STATUS:  ---------------To be determined---------------  Payor: BLUE CROSS / Plan: [facility] / Product Type: PPO /      Medical Necessity:     · Patient demonstrates good rehab potential due to higher previous functional level. Reason for Services/Other Comments:  · Patient continues to require modification of therapeutic interventions to increase complexity of exercises. Use of outcome tool(s) and clinical judgement create a POC that gives a: Questionable prediction of patient's progress: MODERATE COMPLEXITY            TREATMENT:   (In addition to Assessment/Re-Assessment sessions the following treatments were rendered)   Pre-treatment Symptoms/Complaints:  none  Pain: Initial:   Pain Intensity 1: 4  Post Session:  6/10     Therapeutic Activity: (     min): Therapeutic activities including bed mobility, chair transfers Ambulation on level ground and weight shifting and standing balance to improve mobility, strength, balance and coordination. Required moderate Safety awareness training;Verbal cues; Visual/Demos;Manual cues to promote static and dynamic balance in standing. Gait Training (  15 minutes):  Gait training to improve and/or restore physical functioning as related to mobility, strength, balance and coordination. Ambulated 50 Feet (ft) with minimal assistance/contact guard assist and moderate visual, verbal and manual cues related to their stance phase, stride length and heel striketo promote proper body alignment, promote proper body posture and promote proper body mechanics. Instruction in performance of heel strike and walker use to correct stance phase, push off, heel strike and ankle position and motion. Assistive Device: Walker, rolling  Ambulation - Level of Assistance: Minimal assistance  Distance (ft): 50 Feet (ft)  Interventions: Safety awareness training, Verbal cues, Visual/Demos, Manual cues    Therapeutic Exercise: (  10 minutes):  Exercises per grid below to improve mobility, strength, balance and coordination. Required minimal visual, verbal and manual cues to promote proper body alignment. Progressed complexity of movement as indicated. Date:  12/24/17 Date:  12/25/17 Date:     ACTIVITY/EXERCISE AM PM AM PM AM PM   Seated LAQ with controlled lowering  x10 BLE AROM x15 BLE AROM      Seated hip abduction  x10 BLE AAROM x15 BLE AAROM      Seated clam shells with red theraband  x10 BLE AROM x15 BLE AROM      Hamstring curls with red theraband   x10 BLE AROM                                 sB = bilateral; AA = active assistive; A = active; P = passive      Braces/Orthotics/Lines/Etc:   · O2 Device: Room air  Treatment/Session Assessment:    · Response to Treatment:  Patient tolerated therapy well, however was tearful. · Interdisciplinary Collaboration:   o Physical Therapist  o Registered Nurse  · After treatment position/precautions:   o Up in chair  o Bed/Chair-wheels locked  o Bed in low position  o Call light within reach  o RN notified  o Family at bedside   · Compliance with Program/Exercises: Will assess as treatment progresses. · Recommendations/Intent for next treatment session: \"Next visit will focus on advancements to more challenging activities and reduction in assistance provided\".   Total Treatment Duration:  PT Patient Time In/Time Out  Time In: 1115  Time Out: Sierra 20, DPT

## 2017-12-25 NOTE — CONSULTS
PM&R Rehab Consult    Subjective:     Date of Consultation:  December 25, 2017    Referring Physician: Dr. Panchito Garcia    Patient is a 40 y.o. female who is being seen for a PM&R assessment to determine rehab needs due to Cauda Equina syndrome with paraparesis and neurogenic b/b with saddle anesthesia    Active Problems:    Cauda equina syndrome with neurogenic bladder ([address] (12/23/2017)    HPI; per notes \"Jazmine Blake is a 40 y.o.  female who presents with a long H/O low back problems. She has a known lumbar disc problem that she has treated conservatively for years. However, yesterday she was getting out of bed and felt a pop in her back. She had severe LBP and BLE pain all day, L>R.(12/22)  This am, she awoke with urinary incontinence, numbness in the LLE in no specific distribution, and weakness in the LLE to the point that she cannot walk. She has also developed numbness in the saddle region and most recently, while in MRI, numbness in the RLE. The pain has involved all muscle groups in BLEs. She did not feel the Muniz going in just prior to this exam.\"  Work up revealed a L4-5 disc herniation with extrusion with severe narrowing and compression. This was seen on MRI. Dr Panchito Garcia was consulted by the ED and the pt was taken emergently to the OR by Dr Panchito Garcia and underwent a left L4-5 partial hemilaminectomy, discectomy , and foraminotomy for decompression of nerves due to acute cauda equina syndrome. This was performed on 12/23, the day of presentation. She has not yet had a BM since before surgery and she continues to a have muniz catheter for bladder management. Her pain is fairly well controlled. Functionally, she is min/mod A for bed mobility, Min/mod assistance for sit to stand and ambulated 7 feet with RW with mod A. She has not been seem by  OT yet. Past Medical History:   Diagnosis Date    Asthma     Back pain       History reviewed. No pertinent family history.    Social History   Substance Use Topics    Smoking status: Never Smoker    Smokeless tobacco: Current User    Alcohol use No   Home Environment: Private residence  # Steps to Enter: 0  One/Two Story Residence: One story  Living Alone: No  Support Systems: Spouse/Significant Other/Partner, Child(mackenzie)  Patient Expects to be Discharged to[de-identified] Unknown  Current DME Used/Available at Home: None  Prior Level of Function/Work/Activity:  Patient independent with all functional mobility at baseline. 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 Past Surgical History:   Procedure Laterality Date    HX CYST REMOVAL      to groin    HX TYMPANOSTOMY        Prior to Admission medications    Medication Sig Start Date End Date Taking? Authorizing Provider   HYDROcodone-acetaminophen (NORCO)  mg tablet Take 1 Tab by mouth. Yes Phys Other, MD   DULoxetine (CYMBALTA) 60 mg capsule Take 60 mg by mouth daily. Yes Phys Other, MD   ascorbic acid, vitamin C, (VITAMIN C) 250 mg tablet Take  by mouth. Yes Phys Other, MD   Biotin 2,500 mcg cap Take  by mouth. Yes Phys Other, MD     Allergies   Allergen Reactions    Doxycycline Nausea and Vomiting    Pcn [Penicillins] Rash    Percocet [Oxycodone-Acetaminophen] Nausea and Vomiting        Review of Systems:  A comprehensive review of systems was negative except for: Constitutional: positive for fatigue  Gastrointestinal: positive for change in bowel habits and constipation  Genitourinary: positive for urinary incontinence  Musculoskeletal: positive for back pain and muscle weakness  Neurological: positive for gait problems and weakness  Behvioral/Psych: positive for anxiety    Objective:     Vitals:  Blood pressure 116/57, pulse 81, temperature 98.8 °F (37.1 °C), resp. rate 20, height 5' 6\" (1.676 m), weight 195 lb (88.5 kg), SpO2 97 %.   Temp (24hrs), Av.3 °F (36.8 °C), Min:98 °F (36.7 °C), Max:98.8 °F (37.1 °C)      Intake and Output:   1901 -  0700  In: - Out: 3000 [Urine:3000]    Physical Exam:  General:  Alert, oriented and mood affect appropriate   Lungs:   Clear to auscultation bilaterally. Heart:  Regular rate and rhythm, S1, S2 stable, no murmur, click, rub or gallop. Abdomen:   Soft, non-tender. Bowel sounds present. No masses,  No organomegaly. obese   Genitourinary: muniz   Neuro Muscular: UEs 5/5; RLE hip flexion 3/5, KE 3+, DF 0, PF 4  LLE; Hip flexion 3-, KE 3, DF 0, PF 4  Dec light touch in saddle distribution/perianal and perineal area; LLE numbn>> R  sens diminished in lower extrem. Inconsistent on exam.   Skin:  No rashes, lesions, or signs/symptoms or infection. No calf tenderness, no edema       Labs/Studies:  Recent Results (from the past 72 hour(s))   HCG URINE, QL. - POC    Collection Time: 12/23/17  3:37 PM   Result Value Ref Range    Pregnancy test,urine (POC) NEGATIVE  NEG         Ambulation:  Activity and Safety  Activity Level: Up with Assistance  Ambulate: Ambulate to bathroom  Activity: In bed, Family/Visitors present  Activity Assistance: Partial (one person)  Weight Bearing Status: WBAT (Weight Bearing as Tolerated)  Repositioned: Head of bed elevated (degrees)  Patient Turned: Supine, Turns self  Head of Bed Elevated: Self regulated  Activity Response:  Tolerated well  Assistive Device: Fall prevention device  Safety Measures: Bed/Chair alarm on, Bed/Chair-Wheels locked, Bed in low position, Call light within reach, Fall prevention (comment), Family at bedside, Gripper socks, Side rails X2     Impression/Plan:     Active Problems:    Cauda equina syndrome with neurogenic bladder ([facility] (12/23/2017)     Cauda Equina Syndrome due to HNP at L4/5 POD #2 Left L4-L5 partial hemilaminectomy, discectomy, and foraminotomy for decompression of nerves    Recommendations: Continue Acute Rehab Program  Coordination of rehab/medical care  Counseling of PM & R care issues management  Monitoring and management of medical conditions per plan of care/orders   -Excellent inpt rehab candidate due to weakness, gait insufficiency, neurogenic b/b, maria esther foot drop. Pt will need to be on a bowel program. She will have a lower motor neuron effected b/b which complicates routine mgt of b/b.   -pt underwent surgical decompression in less than 48hr from onset of symptoms which increases her chance of improvement in b/b and m/s dysfxn  -will order OT assessment for insurance clearance purposes  -would hold off on AFOs until inpt therapy is well underway to detm the exact mechanism needed (dorsi assist, hinged, solid AFO etc)  -muniz to remain for now.   -will begin [facility] precert tomorrow for admission Wed. 12/27 .  Thank you for this consult  Discussion with Family/Caregiver/Staff  Reviewed Therapies/Labs/Meds/Records    Signed By:  Peggy Leavitt MD     December 25, 2017

## 2017-12-25 NOTE — PROGRESS NOTES
Problem: Falls - Risk of  Goal: *Absence of Falls  Document Judith Fall Risk and appropriate interventions in the flowsheet.    Outcome: Progressing Towards Goal  Fall Risk Interventions:  Mobility Interventions: Bed/chair exit alarm, Patient to call before getting OOB, Strengthening exercises (ROM-active/passive)         Medication Interventions: Assess postural VS orthostatic hypotension, Bed/chair exit alarm, Patient to call before getting OOB, Teach patient to arise slowly    Elimination Interventions: Bed/chair exit alarm, Call light in reach, Patient to call for help with toileting needs

## 2017-12-26 PROCEDURE — 74011250636 HC RX REV CODE- 250/636: Performed by: NEUROLOGICAL SURGERY

## 2017-12-26 PROCEDURE — 97530 THERAPEUTIC ACTIVITIES: CPT

## 2017-12-26 PROCEDURE — 74011000250 HC RX REV CODE- 250

## 2017-12-26 PROCEDURE — 65270000029 HC RM PRIVATE

## 2017-12-26 PROCEDURE — 74011250637 HC RX REV CODE- 250/637: Performed by: NEUROLOGICAL SURGERY

## 2017-12-26 PROCEDURE — 74011250637 HC RX REV CODE- 250/637: Performed by: PHYSICAL MEDICINE & REHABILITATION

## 2017-12-26 PROCEDURE — 97110 THERAPEUTIC EXERCISES: CPT

## 2017-12-26 PROCEDURE — 74011250636 HC RX REV CODE- 250/636

## 2017-12-26 PROCEDURE — 99232 SBSQ HOSP IP/OBS MODERATE 35: CPT | Performed by: PHYSICAL MEDICINE & REHABILITATION

## 2017-12-26 RX ADMIN — HEPARIN SODIUM 5000 UNITS: 5000 INJECTION, SOLUTION INTRAVENOUS; SUBCUTANEOUS at 20:55

## 2017-12-26 RX ADMIN — FAMOTIDINE 20 MG: 20 TABLET, FILM COATED ORAL at 20:54

## 2017-12-26 RX ADMIN — POLYETHYLENE GLYCOL (3350) 17 G: 17 POWDER, FOR SOLUTION ORAL at 09:14

## 2017-12-26 RX ADMIN — DULOXETINE HYDROCHLORIDE 60 MG: 60 CAPSULE, DELAYED RELEASE ORAL at 20:54

## 2017-12-26 RX ADMIN — STANDARDIZED SENNA CONCENTRATE AND DOCUSATE SODIUM 2 TABLET: 8.6; 5 TABLET, FILM COATED ORAL at 09:14

## 2017-12-26 RX ADMIN — HYDROCODONE BITARTRATE AND ACETAMINOPHEN 1 TABLET: 10; 325 TABLET ORAL at 20:54

## 2017-12-26 RX ADMIN — Medication 5 ML: at 14:00

## 2017-12-26 RX ADMIN — HYDROCODONE BITARTRATE AND ACETAMINOPHEN 1 TABLET: 10; 325 TABLET ORAL at 09:14

## 2017-12-26 RX ADMIN — FAMOTIDINE 20 MG: 20 TABLET, FILM COATED ORAL at 09:14

## 2017-12-26 RX ADMIN — HYDROCODONE BITARTRATE AND ACETAMINOPHEN 1 TABLET: 10; 325 TABLET ORAL at 14:39

## 2017-12-26 RX ADMIN — Medication 5 ML: at 06:00

## 2017-12-26 RX ADMIN — Medication 10 ML: at 20:56

## 2017-12-26 RX ADMIN — HEPARIN SODIUM 5000 UNITS: 5000 INJECTION, SOLUTION INTRAVENOUS; SUBCUTANEOUS at 09:15

## 2017-12-26 NOTE — PROGRESS NOTES
Problem: Mobility Impaired (Adult and Pediatric)  Goal: *Acute Goals and Plan of Care (Insert Text)  STG:  (1.)Ms. Danice Sandhoff will move from supine to sit and sit to supine , scoot up and down and roll side to side with CONTACT GUARD ASSIST within 3 day(s). (2.)Ms. Danice Sandhoff will transfer from bed to chair and chair to bed with CONTACT GUARD ASSIST using the least restrictive device within 3 day(s). (3.)Ms. Danice Sandhoff will ambulate with CONTACT GUARD ASSIST for 15 feet with the least restrictive device within 3 day(s). (4.)Ms. Danice Sandhoff will perform standing static and dynamic balance activities x 15 minutes with CONTACT GUARD ASSIST to improve safety within 3 day(s). LTG:  (1.)Ms. Danice Sandhoff will move from supine to sit and sit to supine , scoot up and down and roll side to side in bed with STAND BY ASSIST within 7 day(s). (2.)Ms. Danice Sandhoff will transfer from bed to chair and chair to bed with STAND BY ASSIST using the least restrictive device within 7 day(s). (3.)Ms. Danice Sandhoff will ambulate with STAND BY ASSIST for 45 feet with the least restrictive device within 7 day(s).   ________________________________________________________________________________________________      PHYSICAL THERAPY: Daily Note, Treatment Day: 2nd, AM 12/26/2017  INPATIENT: Hospital Day: 4  Payor: Trinh Trevino / Plan: [facility] / Product Type: PPO /      NAME/AGE/GENDER: Chelsey Bhat is a 40 y.o. female   PRIMARY DIAGNOSIS: BULGING DISC  Cauda equina syndrome with neurogenic bladder (HCC)  Cauda equina syndrome with neurogenic bladder ([address] <principal problem not specified> <principal problem not specified>  Procedure(s) (LRB):  SPINE LUMBAR LAMINECTOMY MINIMALLY INVASIVE LEFT SIDE (Left)  3 Days Post-Op  ICD-10: Treatment Diagnosis:   · Difficulty in walking, Not elsewhere classified (R26.2)   Precaution/Allergies:  Doxycycline; Pcn [penicillins]; and Percocet [oxycodone-acetaminophen]      ASSESSMENT:     Ms. Danice Sandhoff is a 40 y.o. female with cauda equina syndrome and s/p above surgery. 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 This morning, pt presents supine and is very agreeable to therapy. Pt performed supine to sit with SBA. Pt received PROM for HC stretch and then applied manual assistance to BLE with theraband to place patient in increased dorsiflexion to assist with normalized gait pattern during gait as she continues to have trace muscle strength in BLE dorsiflexion. Pt stood and was able to ambulate 50' with rolling walker and min assist,  bringing chair behind. Pt was working on heel strike with each step. Turning is a little more difficult for pt and tends to not be quite as steady. Pt returned to room and was left sitting up with  present. Pt is making progress and puts forth great effort. Will continue with POC. This section established at most recent assessment   PROBLEM LIST (Impairments causing functional limitations):  1. Decreased Strength  2. Decreased ADL/Functional Activities  3. Decreased Transfer Abilities  4. Decreased Ambulation Ability/Technique  5. Decreased Balance  6. Increased Pain  7. Decreased Knowledge of Precautions  8. Decreased [place] with Home Exercise Program   INTERVENTIONS PLANNED: (Benefits and precautions of physical therapy have been discussed with the patient.)  1. Balance Exercise  2. Bed Mobility  3. Family Education  4. Gait Training  5. Home Exercise Program (HEP)  6. Therapeutic Activites  7. Therapeutic Exercise/Strengthening  8. Transfer Training  9. Patient Education  10.  Group Therapy     TREATMENT PLAN: Frequency/Duration: twice daily for duration of hospital stay  Rehabilitation Potential For Stated Goals: Good     RECOMMENDED REHABILITATION/EQUIPMENT: (at time of discharge pending progress): Due to the probability of continued deficits (see above) this patient will likely need continued skilled physical therapy after discharge. Equipment:    None at this time              HISTORY:   History of Present Injury/Illness (Reason for Referral):  Per MD Note: Nathaniel Padilla is a 40 y.o.  female who presents with a long H/O low back problems. She has a known lumbar disc problem that she has treated conservatively for years. However, yesterday she was getting out of bed and felt a pop in her back. She had severe LBP and BLE pain all day, L>R. This am, she awoke with urinary incontinence, numbness in the LLE in no specific distribution, and weakness in the LLE to the point that she cannot walk. She has also developed numbness in the saddle region and most recently, while in MRI, numbness in the RLE. The pain has involved all muscle groups in BLEs. She did not feel the Wilson going in just prior to this exam.      There are no active problems to display for this patient. \"  Past Medical History/Comorbidities:   Ms. Ruslan Padilla  has a past medical history of Asthma and Back pain. Ms. Ruslan Padilla  has a past surgical history that includes hx cyst removal and hx tympanostomy. Social History/Living Environment:   Home Environment: Private residence  # Steps to Enter: 0  One/Two Story Residence: One story  Living Alone: No  Support Systems: Spouse/Significant Other/Partner, Child(mackenzie)  Patient Expects to be Discharged to[de-identified] Unknown  Current DME Used/Available at Home: None  Prior Level of Function/Work/Activity:  Patient independent with all functional mobility at baseline.      Number of Personal Factors/Comorbidities that affect the Plan of Care: 1-2: MODERATE COMPLEXITY   EXAMINATION:   Most Recent Physical Functioning:   Gross Assessment:                  Posture:     Balance:  Sitting: Intact  Standing - Static: Fair (+)  Standing - Dynamic : Fair Bed Mobility:  Rolling: Stand-by asssistance  Supine to Sit: Stand-by asssistance  Scooting: Stand-by asssistance  Wheelchair Mobility:     Transfers:  Sit to Stand: Contact guard assistance  Stand to Sit: Contact guard assistance  Gait:     Base of Support: Center of gravity altered; Widened  Speed/Lata: Slow;Shuffled  Step Length: Left shortened;Right shortened  Gait Abnormalities: Decreased step clearance;Trunk sway increased; Foot drop  Distance (ft): 50 Feet (ft)  Assistive Device: Walker, rolling  Ambulation - Level of Assistance: Minimal assistance  Interventions: Safety awareness training;Verbal cues; Tactile cues      Body Structures Involved:  1. Nerves  2. Muscles Body Functions Affected:  1. Sensory/Pain  2. Neuromusculoskeletal  3. Movement Related Activities and Participation Affected:  1. Mobility  2. Self Care  3. Domestic Life  4. Interpersonal Interactions and Relationships  5. Community, Social and [place]   Number of elements that affect the Plan of Care: 4+: HIGH COMPLEXITY   CLINICAL PRESENTATION:   Presentation: Evolving clinical presentation with changing clinical characteristics: MODERATE COMPLEXITY   CLINICAL DECISION MAKIN [facility] Inpatient Short Form  How much difficulty does the patient currently have. .. Unable A Lot A Little None   1. Turning over in bed (including adjusting bedclothes, sheets and blankets)? [] 1   [] 2   [x] 3   [] 4   2. Sitting down on and standing up from a chair with arms ( e.g., wheelchair, bedside commode, etc.)   [] 1   [] 2   [x] 3   [] 4   3. Moving from lying on back to sitting on the side of the bed? [] 1   [] 2   [x] 3   [] 4   How much help from another person does the patient currently need. .. Total A Lot A Little None   4. Moving to and from a bed to a chair (including a wheelchair)? [] 1   [x] 2   [] 3   [] 4   5. Need to walk in hospital room? [] 1   [x] 2   [] 3   [] 4   6. Climbing 3-5 steps with a railing? [x] 1   [] 2   [] 3   [] 4   © 2007, Trustees of [address] [place] [address] [address] [address], under license to [employer]. All rights reserved      Score:  Initial: 14 Most Recent: X (Date: -- )    Interpretation of Tool:  Represents activities that are increasingly more difficult (i.e. Bed mobility, Transfers, Gait). Score 24 23 22-20 19-15 14-10 9-7 6     Modifier CH CI CJ CK CL CM CN      ? Mobility - Walking and Moving Around:     - CURRENT STATUS: CL - 60%-79% impaired, limited or restricted    - GOAL STATUS: CK - 40%-59% impaired, limited or restricted    - D/C STATUS:  ---------------To be determined---------------  Payor: BLUE CROSS / Plan: [place] BLUE [facility] / Product Type: PPO /      Medical Necessity:     · Patient demonstrates good rehab potential due to higher previous functional level. Reason for Services/Other Comments:  · Patient continues to require modification of therapeutic interventions to increase complexity of exercises. Use of outcome tool(s) and clinical judgement create a POC that gives a: Questionable prediction of patient's progress: MODERATE COMPLEXITY            TREATMENT:      Pre-treatment Symptoms/Complaints:  none  Pain: Initial:      Post Session:  6/10     Therapeutic Activity: (   15 minutes): Therapeutic activities including bed mobility, chair transfers Ambulation on level ground and weight shifting and standing balance to improve mobility, strength, balance and coordination. Required moderate Safety awareness training;Verbal cues; Tactile cues to promote static and dynamic balance in standing. Gait Training (  0 minutes):  Gait training to improve and/or restore physical functioning as related to mobility, strength, balance and coordination.   Ambulated 50 Feet (ft) with minimal assistance/contact guard assist and moderate visual, verbal and manual cues related to their stance phase, stride length and heel striketo promote proper body alignment, promote proper body posture and promote proper body mechanics. Instruction in performance of heel strike and walker use to correct stance phase, push off, heel strike and ankle position and motion. Assistive Device: Walker, rolling  Ambulation - Level of Assistance: Minimal assistance  Distance (ft): 50 Feet (ft)  Interventions: Safety awareness training, Verbal cues, Tactile cues    Therapeutic Exercise: (  0 minutes):  Exercises per grid below to improve mobility, strength, balance and coordination. Required minimal visual, verbal and manual cues to promote proper body alignment. Progressed complexity of movement as indicated. Date:  12/24/17 Date:  12/25/17 Date:     ACTIVITY/EXERCISE AM PM AM PM AM PM   Seated LAQ with controlled lowering  x10 BLE AROM x15 BLE AROM      Seated hip abduction  x10 BLE AAROM x15 BLE AAROM      Seated clam shells with red theraband  x10 BLE AROM x15 BLE AROM      Hamstring curls with red theraband   x10 BLE AROM                                 sB = bilateral; AA = active assistive; A = active; P = passive      Braces/Orthotics/Lines/Etc:   · O2 Device: Room air  Treatment/Session Assessment:    · Response to Treatment:  Patient tolerated therapy well, however was tearful. · Interdisciplinary Collaboration:   o Physical Therapy Assistant  o Registered Nurse  · After treatment position/precautions:   o Up in chair  o Bed/Chair-wheels locked  o Bed in low position  o Call light within reach  o RN notified  o Family at bedside   · Compliance with Program/Exercises: Will assess as treatment progresses. · Recommendations/Intent for next treatment session: \"Next visit will focus on advancements to more challenging activities and reduction in assistance provided\".   Total Treatment Duration:  PT Patient Time In/Time Out  Time In: 1000  Time Out: CLIFFORD Mccabe

## 2017-12-26 NOTE — PROGRESS NOTES
Problem: Mobility Impaired (Adult and Pediatric)  Goal: *Acute Goals and Plan of Care (Insert Text)  STG:  (1.)Ms. Tristan Beard will move from supine to sit and sit to supine , scoot up and down and roll side to side with CONTACT GUARD ASSIST within 3 day(s). (2.)Ms. Tristan Beard will transfer from bed to chair and chair to bed with CONTACT GUARD ASSIST using the least restrictive device within 3 day(s). (3.)Ms. Tristan Beard will ambulate with CONTACT GUARD ASSIST for 15 feet with the least restrictive device within 3 day(s). (4.)Ms. Tristan Beard will perform standing static and dynamic balance activities x 15 minutes with CONTACT GUARD ASSIST to improve safety within 3 day(s). LTG:  (1.)Ms. Tristan Beard will move from supine to sit and sit to supine , scoot up and down and roll side to side in bed with STAND BY ASSIST within 7 day(s). (2.)Ms. Tristan Beard will transfer from bed to chair and chair to bed with STAND BY ASSIST using the least restrictive device within 7 day(s). (3.)Ms. Tristan Beard will ambulate with STAND BY ASSIST for 45 feet with the least restrictive device within 7 day(s).   ________________________________________________________________________________________________      PHYSICAL THERAPY: Daily Note, Treatment Day: 2nd, PM 12/26/2017  INPATIENT: Hospital Day: 4  Payor: Lucy Daily / [facility] / Product Type: PPO /      NAME/AGE/GENDER: Estelita Pedro is a 40 y.o. female   PRIMARY DIAGNOSIS: BULGING DISC  Cauda equina syndrome with neurogenic bladder (HCC)  Cauda equina syndrome with neurogenic bladder ([address] <principal problem not specified> <principal problem not specified>  Procedure(s) (LRB):  SPINE LUMBAR LAMINECTOMY MINIMALLY INVASIVE LEFT SIDE (Left)  3 Days Post-Op  ICD-10: Treatment Diagnosis:   · Difficulty in walking, Not elsewhere classified (R26.2)   Precaution/Allergies:  Doxycycline; Pcn [penicillins]; and Percocet [oxycodone-acetaminophen]      ASSESSMENT:     Ms. Tristan Beard is a 40 y.o. female with cauda equina syndrome and s/p above surgery. 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 This afternoon, pt again supine. Pt stated she sat up until about 1:00 earlier today. Pt performed exercises supine in bed as below. Pt then performed supine to sit, applied manual assistance to BLE with theraband to place patient in increased dorsiflexion to assist with normalized gait pattern during gait. Pt stood and was able to ambulate 79' with rolling walker and min assist, chair being brought behind. Pt was working on heel strike with each step. Turning is a little more difficult for pt and tends to not be quite as steady. Pt returned to room and was left sitting up with needs in reach. Pt is making progress and puts forth great effort. Will continue with POC. This section established at most recent assessment   PROBLEM LIST (Impairments causing functional limitations):  1. Decreased Strength  2. Decreased ADL/Functional Activities  3. Decreased Transfer Abilities  4. Decreased Ambulation Ability/Technique  5. Decreased Balance  6. Increased Pain  7. Decreased Knowledge of Precautions  8. Decreased [place] with Home Exercise Program   INTERVENTIONS PLANNED: (Benefits and precautions of physical therapy have been discussed with the patient.)  1. Balance Exercise  2. Bed Mobility  3. Family Education  4. Gait Training  5. Home Exercise Program (HEP)  6. Therapeutic Activites  7. Therapeutic Exercise/Strengthening  8. Transfer Training  9. Patient Education  10.  Group Therapy     TREATMENT PLAN: Frequency/Duration: twice daily for duration of hospital stay  Rehabilitation Potential For Stated Goals: Good     RECOMMENDED REHABILITATION/EQUIPMENT: (at time of discharge pending progress): Due to the probability of continued deficits (see above) this patient will likely need continued skilled physical therapy after discharge. Equipment:    None at this time              HISTORY:   History of Present Injury/Illness (Reason for Referral):  Per MD Note: Nannette Tolbert is a 40 y.o.  female who presents with a long H/O low back problems. She has a known lumbar disc problem that she has treated conservatively for years. However, yesterday she was getting out of bed and felt a pop in her back. She had severe LBP and BLE pain all day, L>R. This am, she awoke with urinary incontinence, numbness in the LLE in no specific distribution, and weakness in the LLE to the point that she cannot walk. She has also developed numbness in the saddle region and most recently, while in MRI, numbness in the RLE. The pain has involved all muscle groups in BLEs. She did not feel the Wilson going in just prior to this exam.      There are no active problems to display for this patient. \"  Past Medical History/Comorbidities:   Ms. Nichole Tolbert  has a past medical history of Asthma and Back pain. Ms. Nichole Tolbert  has a past surgical history that includes hx cyst removal and hx tympanostomy. Social History/Living Environment:   Home Environment: Private residence  # Steps to Enter: 0  One/Two Story Residence: One story  Living Alone: No  Support Systems: Spouse/Significant Other/Partner, Child(mackenzie)  Patient Expects to be Discharged to[de-identified] Unknown  Current DME Used/Available at Home: None  Prior Level of Function/Work/Activity:  Patient independent with all functional mobility at baseline.      Number of Personal Factors/Comorbidities that affect the Plan of Care: 1-2: MODERATE COMPLEXITY   EXAMINATION:   Most Recent Physical Functioning:   Gross Assessment:                  Posture:     Balance:  Sitting: Intact  Standing - Static: Fair (+)  Standing - Dynamic : Fair Bed Mobility:  Rolling: Stand-by asssistance  Supine to Sit: Stand-by asssistance  Scooting: Stand-by asssistance  Wheelchair Mobility:     Transfers:  Sit to Stand: Contact guard assistance  Stand to Sit: Contact guard assistance  Gait:     Base of Support: Center of gravity altered; Widened  Speed/Lata: Slow;Shuffled  Step Length: Left shortened;Right shortened  Gait Abnormalities: Decreased step clearance;Trunk sway increased; Foot drop  Distance (ft): 70 Feet (ft)  Assistive Device: Walker, rolling  Ambulation - Level of Assistance: Minimal assistance  Interventions: Safety awareness training;Verbal cues; Tactile cues      Body Structures Involved:  1. Nerves  2. Muscles Body Functions Affected:  1. Sensory/Pain  2. Neuromusculoskeletal  3. Movement Related Activities and Participation Affected:  1. Mobility  2. Self Care  3. Domestic Life  4. Interpersonal Interactions and Relationships  5. Community, Social and [place]   Number of elements that affect the Plan of Care: 4+: HIGH COMPLEXITY   CLINICAL PRESENTATION:   Presentation: Evolving clinical presentation with changing clinical characteristics: MODERATE COMPLEXITY   CLINICAL DECISION MAKIN [facility] Inpatient Short Form  How much difficulty does the patient currently have. .. Unable A Lot A Little None   1. Turning over in bed (including adjusting bedclothes, sheets and blankets)? [] 1   [] 2   [x] 3   [] 4   2. Sitting down on and standing up from a chair with arms ( e.g., wheelchair, bedside commode, etc.)   [] 1   [] 2   [x] 3   [] 4   3. Moving from lying on back to sitting on the side of the bed? [] 1   [] 2   [x] 3   [] 4   How much help from another person does the patient currently need. .. Total A Lot A Little None   4. Moving to and from a bed to a chair (including a wheelchair)? [] 1   [x] 2   [] 3   [] 4   5. Need to walk in hospital room?    [] 1   [x] 2   [] 3   [] 4 6.  Climbing 3-5 steps with a railing? [x] 1   [] 2   [] 3   [] 4   © 2007, Trustees of [address] [place] [address] [address] [address], under license to [employer]. All rights reserved      Score:  Initial: 14 Most Recent: X (Date: -- )    Interpretation of Tool:  Represents activities that are increasingly more difficult (i.e. Bed mobility, Transfers, Gait). Score 24 23 22-20 19-15 14-10 9-7 6     Modifier CH CI CJ CK CL CM CN      ? Mobility - Walking and Moving Around:     - CURRENT STATUS: CL - 60%-79% impaired, limited or restricted    - GOAL STATUS: CK - 40%-59% impaired, limited or restricted    - D/C STATUS:  ---------------To be determined---------------  Payor: BLUE CROSS / Plan: [place] BLUE CROSS [facility] / Product Type: PPO /      Medical Necessity:     · Patient demonstrates good rehab potential due to higher previous functional level. Reason for Services/Other Comments:  · Patient continues to require modification of therapeutic interventions to increase complexity of exercises. Use of outcome tool(s) and clinical judgement create a POC that gives a: Questionable prediction of patient's progress: MODERATE COMPLEXITY            TREATMENT:      Pre-treatment Symptoms/Complaints:  none  Pain: Initial:      Post Session:  6/10     Therapeutic Activity: (   15 minutes): Therapeutic activities including bed mobility, chair transfers Ambulation on level ground and weight shifting and standing balance to improve mobility, strength, balance and coordination. Required moderate Safety awareness training;Verbal cues; Tactile cues to promote static and dynamic balance in standing. Gait Training (  0 minutes):  Gait training to improve and/or restore physical functioning as related to mobility, strength, balance and coordination.   Ambulated 70 Feet (ft) with minimal assistance/contact guard assist and moderate visual, verbal and manual cues related to their stance phase, stride length and heel striketo promote proper body alignment, promote proper body posture and promote proper body mechanics. Instruction in performance of heel strike and walker use to correct stance phase, push off, heel strike and ankle position and motion. Assistive Device: Walker, rolling  Ambulation - Level of Assistance: Minimal assistance  Distance (ft): 70 Feet (ft)  Interventions: Safety awareness training, Verbal cues, Tactile cues    Therapeutic Exercise: (  10 minutes):  Exercises per grid below to improve mobility, strength, balance and coordination. Required minimal visual, verbal and manual cues to promote proper body alignment. Progressed complexity of movement as indicated. Date:  12/24/17 Date:  12/25/17 Date:  12/26/17   ACTIVITY/EXERCISE AM PM AM PM AM PM   Seated LAQ with controlled lowering  x10 BLE AROM x15 BLE AROM      Seated hip abduction  x10 BLE AAROM x15 BLE AAROM      Seated clam shells with red theraband  x10 BLE AROM x15 BLE AROM      Hamstring curls with red theraband   x10 BLE AROM      Supine heel slides      X 12 B   Supine HC stretch      X 3   Supine hip abd      X 12 B   QS      X 10 B   GS      X 10 B   sB = bilateral; AA = active assistive; A = active; P = passive      Braces/Orthotics/Lines/Etc:   · O2 Device: Room air  Treatment/Session Assessment:    · Response to Treatment:  Patient tolerated therapy well, however was tearful. · Interdisciplinary Collaboration:   o Physical Therapy Assistant  o Registered Nurse  · After treatment position/precautions:   o Up in chair  o Bed/Chair-wheels locked  o Bed in low position  o Call light within reach  o RN notified  o Family at bedside   · Compliance with Program/Exercises: Will assess as treatment progresses. · Recommendations/Intent for next treatment session: \"Next visit will focus on advancements to more challenging activities and reduction in assistance provided\".   Total Treatment Duration:  PT Patient Time In/Time Out  Time In: 1540  Time Out: [address] [place], [place]

## 2017-12-26 NOTE — PROGRESS NOTES
Problem: Falls - Risk of  Goal: *Absence of Falls  Document Judith Fall Risk and appropriate interventions in the flowsheet.    Outcome: Progressing Towards Goal  Fall Risk Interventions:  Mobility Interventions: Bed/chair exit alarm, Patient to call before getting OOB, PT Consult for mobility concerns         Medication Interventions: Bed/chair exit alarm, Patient to call before getting OOB    Elimination Interventions: Bed/chair exit alarm, Call light in reach, Patient to call for help with toileting needs

## 2017-12-26 NOTE — PROGRESS NOTES
made initial visit. Pt was alert and verbal.   provided spiritual care through presence and pastoral conversation. Pts , son, daughter, and daughter's boyfriend were present for visit.

## 2017-12-26 NOTE — PROGRESS NOTES
CM Specialist Mercedes Petit met with patient regarding discharge planning. Patient is alert. Patient resides with  and children. Home is a one level with no steps to the entrance. Patient does not have a PCP but uses any medical professional at [facility] when needed. Patient states that she receives Pain Management services with her employer Dr. Carmela Bishop at [facility] in [place], [place].  Prior to being admitted patient was independent with all of her ALDs. Patient has no assistive devices, O2, breathing assistive, or any community services prior to being admitted. Patient was able to drive herself. Patient is not a Diabetic and does not receive any Dialysis. Patient has no concerns or issues with affording medications. Patient states that she was being moved to the 9th floor today to receive Physical Rehab services. Patient did not voice any needs at this time and suggested that she may need services after Physical Rehab. CM informed patient that a  will follow up prior to being discharged. CM will relate information to RN Case Manager Shaunna Goldmann. CM will continue to follow for discharge needs. .Care Management Interventions  PCP Verified by CM: Yes  Mode of Transport at Discharge:  Other (see comment) ()  Transition of Care Consult (CM Consult): Discharge Planning  Physical Therapy Consult: Yes  Current Support Network: Lives with Spouse, Own Home, Other ( & Children)

## 2017-12-26 NOTE — PROGRESS NOTES
Pt seen by Dr Jono Alvarado and accepted to [facility] for rehab, precert pending. Will await response from insurance.

## 2017-12-27 LAB
MM INDURATION POC: NORMAL MM (ref 0–5)
PPD POC: NORMAL NEGATIVE

## 2017-12-27 PROCEDURE — 99232 SBSQ HOSP IP/OBS MODERATE 35: CPT | Performed by: PHYSICAL MEDICINE & REHABILITATION

## 2017-12-27 PROCEDURE — 65270000029 HC RM PRIVATE

## 2017-12-27 PROCEDURE — 74011250637 HC RX REV CODE- 250/637: Performed by: NEUROLOGICAL SURGERY

## 2017-12-27 PROCEDURE — 74011250637 HC RX REV CODE- 250/637: Performed by: PHYSICAL MEDICINE & REHABILITATION

## 2017-12-27 PROCEDURE — 97530 THERAPEUTIC ACTIVITIES: CPT

## 2017-12-27 PROCEDURE — 74011250636 HC RX REV CODE- 250/636: Performed by: NEUROLOGICAL SURGERY

## 2017-12-27 RX ORDER — TAMSULOSIN HYDROCHLORIDE 0.4 MG/1
0.4 CAPSULE ORAL DAILY
Status: DISCONTINUED | OUTPATIENT
Start: 2017-12-27 | End: 2017-12-29 | Stop reason: HOSPADM

## 2017-12-27 RX ADMIN — FAMOTIDINE 20 MG: 20 TABLET, FILM COATED ORAL at 09:26

## 2017-12-27 RX ADMIN — Medication 10 ML: at 21:02

## 2017-12-27 RX ADMIN — STANDARDIZED SENNA CONCENTRATE AND DOCUSATE SODIUM 2 TABLET: 8.6; 5 TABLET, FILM COATED ORAL at 09:26

## 2017-12-27 RX ADMIN — Medication 10 ML: at 06:00

## 2017-12-27 RX ADMIN — LACTULOSE 30 G: 20 SOLUTION ORAL at 11:55

## 2017-12-27 RX ADMIN — POLYETHYLENE GLYCOL (3350) 17 G: 17 POWDER, FOR SOLUTION ORAL at 09:27

## 2017-12-27 RX ADMIN — FAMOTIDINE 20 MG: 20 TABLET, FILM COATED ORAL at 20:59

## 2017-12-27 RX ADMIN — Medication 5 ML: at 15:27

## 2017-12-27 RX ADMIN — HEPARIN SODIUM 5000 UNITS: 5000 INJECTION, SOLUTION INTRAVENOUS; SUBCUTANEOUS at 21:00

## 2017-12-27 RX ADMIN — DULOXETINE HYDROCHLORIDE 60 MG: 60 CAPSULE, DELAYED RELEASE ORAL at 20:59

## 2017-12-27 RX ADMIN — HEPARIN SODIUM 5000 UNITS: 5000 INJECTION, SOLUTION INTRAVENOUS; SUBCUTANEOUS at 09:26

## 2017-12-27 RX ADMIN — HYDROCODONE BITARTRATE AND ACETAMINOPHEN 1 TABLET: 10; 325 TABLET ORAL at 15:26

## 2017-12-27 RX ADMIN — HYDROCODONE BITARTRATE AND ACETAMINOPHEN 1 TABLET: 10; 325 TABLET ORAL at 20:59

## 2017-12-27 RX ADMIN — HYDROCODONE BITARTRATE AND ACETAMINOPHEN 1 TABLET: 10; 325 TABLET ORAL at 09:33

## 2017-12-27 RX ADMIN — TAMSULOSIN HYDROCHLORIDE 0.4 MG: 0.4 CAPSULE ORAL at 11:57

## 2017-12-27 NOTE — PROGRESS NOTES
Patient working with PT this afternoon when OT attempted. Will check back as schedule permits.    Juan Hernández, OTR/L

## 2017-12-27 NOTE — PROGRESS NOTES
PM&R Consult Progress Note      Patient: Reilly Foot  Admit Date: 12/23/2017  Admit Diagnosis: BULGING DISC;Cauda equina syndrome with neurogenic bladder *  Recommendations: Continue Acute Rehab Program, Coordination of rehab/medical care, Counseling of PM & R care issues management, [address] today pending word from Qamar Jones 150. Starting to regain some DF of the ankles. Still with muniz; will start flomax and dc muniz in a.m.; CIC prn no void. -pain controlled, DVT proph with Hep  -NO BM x 7d; had started bowel program; will give lactulose today. abd is soft, bs +, passing gas, no nausea  History/Subjective/Complaint:     Patient seen and examined. Records reviewed. \" I am doing well.  \" Anxious to get to rehab  Pain 1  Pain Scale 1: Numeric (0 - 10) (12/27/17 0934)  Pain Intensity 1: 5 (12/27/17 0934)  Patient Stated Pain Goal: 1 (12/27/17 0934)  Pain Reassessment 1: Yes (12/27/17 0934)  Pain Onset 1: post op (12/27/17 0934)  Pain Location 1: Back (12/27/17 0934)  Pain Orientation 1: Lower (12/27/17 0934)  Pain Description 1: Aching (12/27/17 0934)  Pain Intervention(s) 1: Medication (see MAR) (12/27/17 0934)     Objective:     Vitals:  Patient Vitals for the past 8 hrs:   BP Temp Pulse Resp SpO2   12/27/17 0731 100/59 98.3 °F (36.8 °C) 73 20 98 %      Intake and Output:  12/25 1901 - 12/27 0700  In: 720 [P.O.:720]  Out: 3000 [Urine:3000]    Allergies   Allergen Reactions    Doxycycline Nausea and Vomiting    Pcn [Penicillins] Rash    Percocet [Oxycodone-Acetaminophen] Nausea and Vomiting     Current Facility-Administered Medications   Medication Dose Route Frequency    magnesium hydroxide (MILK OF MAGNESIA) 400 mg/5 mL oral suspension 30 mL  30 mL Oral DAILY PRN    polyethylene glycol (MIRALAX) packet 17 g  17 g Oral DAILY    bisacodyl (DULCOLAX) suppository 10 mg  10 mg Rectal DAILY PRN    dextrose 5% - 0.45% NaCl with KCl 20 mEq/L infusion  75 mL/hr IntraVENous CONTINUOUS  sodium chloride (NS) flush 5-10 mL  5-10 mL IntraVENous Q8H    sodium chloride (NS) flush 5-10 mL  5-10 mL IntraVENous PRN    acetaminophen (TYLENOL) tablet 650 mg  650 mg Oral Q4H PRN    HYDROcodone-acetaminophen (NORCO)  mg tablet 1 Tab  1 Tab Oral Q4H PRN    morphine injection 10 mg  10 mg IntraMUSCular Q4H PRN    naloxone (NARCAN) injection 0.4 mg  0.4 mg IntraVENous PRN    ondansetron (ZOFRAN) injection 4 mg  4 mg IntraVENous Q4H PRN    famotidine (PEPCID) tablet 20 mg  20 mg Oral Q12H    senna-docusate (PERICOLACE) 8.6-50 mg per tablet 2 Tab  2 Tab Oral DAILY    heparin (porcine) injection 5,000 Units  5,000 Units SubCUTAneous Q12H    cloNIDine HCl (CATAPRES) tablet 0.1 mg  0.1 mg Oral Q4H PRN    DULoxetine (CYMBALTA) capsule 60 mg  60 mg Oral QHS       Physical Exam:  CV rrr no m  LUNGS cta b  ABD soft , ntnd, bs + all 4 quadrants  EXT; no edema. Neuro; hip flexion 3+, KE 3+, DF 2-, PF 4    Incision(s)/Wound(s): Wound Back Mid;Lower (Active)   DRESSING STATUS Clean, dry, and intact 12/27/2017  7:49 AM   DRESSING TYPE Adhesive wound dressing (Band-Aid) 12/27/2017  7:49 AM   Incision site well approximated? Yes 12/26/2017  9:06 AM   Drainage Amount  None 12/26/2017  8:10 PM   Wound Odor None 12/25/2017  5:58 AM   Periwound Skin Condition Intact 12/25/2017  5:58 AM   Dressing Type Applied Adhesive wound dressing (Band-Aid) 12/25/2017  5:58 AM   Number of days:4              Functional Assessment:  Gross Assessment  AROM: Grossly decreased, non-functional (12/24/17 1100)  PROM: Within functional limits (12/24/17 1100)  Strength: Grossly decreased, non-functional (12/24/17 1100)  Coordination: Grossly decreased, non-functional (12/24/17 1100)  Sensation: Impaired (12/24/17 1100)     Gait  Base of Support: Center of gravity altered; Widened (12/26/17 1015)  Speed/Lata: Slow;Shuffled (12/26/17 1015)  Step Length: Left shortened;Right shortened (12/26/17 1015)  Gait Abnormalities: Decreased step clearance;Trunk sway increased; Foot drop (12/26/17 1015)  Ambulation - Level of Assistance: Minimal assistance (12/26/17 1015)  Distance (ft): 70 Feet (ft) (12/26/17 1600)  Assistive Device: Walker, rolling (12/26/17 1015)  Interventions: Safety awareness training;Verbal cues; Tactile cues (12/26/17 1015)     Bed Mobility  Rolling: Stand-by asssistance (12/26/17 1015)  Supine to Sit: Stand-by asssistance (12/26/17 1015)  Sit to Supine: Moderate assistance (12/24/17 1100)  Scooting: Stand-by asssistance (12/26/17 1015)     Balance  Sitting: Intact (12/26/17 1015)  Standing: Impaired (12/25/17 1100)  Standing - Static: Fair (+) (12/26/17 1015)  Standing - Dynamic : Fair (12/26/17 1015)                       Bed/Mat Mobility  Rolling: Stand-by asssistance (12/26/17 1015)  Supine to Sit: Stand-by asssistance (12/26/17 1015)  Sit to Supine:  Moderate assistance (12/24/17 1100)  Sit to Stand: Contact guard assistance (12/26/17 1015)  Bed to Chair: Minimum assistance (12/24/17 1600)  Scooting: Stand-by asssistance (12/26/17 1015)     Labs/Studies:  Recent Results (from the past 67 hour(s))   PLEASE READ & DOCUMENT PPD TEST IN 24 HRS    Collection Time: 12/25/17  5:44 PM   Result Value Ref Range    PPD neg Negative    mm Induration 0 mm   PLEASE READ & DOCUMENT PPD TEST IN 48 HRS    Collection Time: 12/25/17  7:04 PM   Result Value Ref Range    PPD  Negative    mm Induration  mm        Assessment:     Active Problems:    Cauda equina syndrome with neurogenic bladder ([address] (12/23/2017)        Plan:     Recommendations: Continue Acute Rehab Program  Coordination of rehab/medical care  Counseling of PM & R care issues management  Monitoring and management of medical conditions per plan of care/orders  Discussion with Family/Caregiver/Staff  Reviewed Therapies/Labs/Medications/Records    Signed By:  Marissa Rivera MD     December 27, 2017

## 2017-12-27 NOTE — PROGRESS NOTES
Problem: Mobility Impaired (Adult and Pediatric)  Goal: *Acute Goals and Plan of Care (Insert Text)  STG:  (1.)Ms. Michael Savage will move from supine to sit and sit to supine , scoot up and down and roll side to side with CONTACT GUARD ASSIST within 3 day(s). (2.)Ms. Michael Savage will transfer from bed to chair and chair to bed with CONTACT GUARD ASSIST using the least restrictive device within 3 day(s). (3.)Ms. Michael Savage will ambulate with CONTACT GUARD ASSIST for 15 feet with the least restrictive device within 3 day(s). (4.)Ms. Michael Savage will perform standing static and dynamic balance activities x 15 minutes with CONTACT GUARD ASSIST to improve safety within 3 day(s). LTG:  (1.)Ms. Michael Savage will move from supine to sit and sit to supine , scoot up and down and roll side to side in bed with STAND BY ASSIST within 7 day(s). (2.)Ms. Michael Savage will transfer from bed to chair and chair to bed with STAND BY ASSIST using the least restrictive device within 7 day(s). (3.)Ms. Michael Savage will ambulate with STAND BY ASSIST for 45 feet with the least restrictive device within 7 day(s).   ________________________________________________________________________________________________      PHYSICAL THERAPY: Daily Note, Treatment Day: 3rd, PM 12/27/2017  INPATIENT: Hospital Day: 5  Payor: Su Finley / Plan: [facility] / Product Type: PPO /      NAME/AGE/GENDER: Danae Petersen is a 40 y.o. female   PRIMARY DIAGNOSIS: BULGING DISC  Cauda equina syndrome with neurogenic bladder (HCC)  Cauda equina syndrome with neurogenic bladder ([address] <principal problem not specified> <principal problem not specified>  Procedure(s) (LRB):  SPINE LUMBAR LAMINECTOMY MINIMALLY INVASIVE LEFT SIDE (Left)  4 Days Post-Op  ICD-10: Treatment Diagnosis:   · Difficulty in walking, Not elsewhere classified (R26.2)   Precaution/Allergies:  Doxycycline; Pcn [penicillins]; and Percocet [oxycodone-acetaminophen]      ASSESSMENT:     Ms. Michael Savage is a 40 y.o. female with cauda equina syndrome and s/p above surgery. 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 This afternoon, pt sitting up in chair and is pleased because she has some dorsiflexion on the R.  pt reports doing exercises twice today already and will do them again later. Manual assistance applied to BLE with theraband to place patient in increased dorsiflexion to assist with normalized gait pattern during gait. Pt was able to stand and increased her ambulation to 100' with rolling walker and CGA/min assist.  Pt with a smoother gait pattern and slightly increased gait speed was maintained safety. Pt continues to require slightly more assist with turning. Pt returned to room and then supine in bed. Pt is making good progress with a more fluid gait and increased dorsiflexion on the R. Will continue with POC. This section established at most recent assessment   PROBLEM LIST (Impairments causing functional limitations):  1. Decreased Strength  2. Decreased ADL/Functional Activities  3. Decreased Transfer Abilities  4. Decreased Ambulation Ability/Technique  5. Decreased Balance  6. Increased Pain  7. Decreased Knowledge of Precautions  8. Decreased [place] with Home Exercise Program   INTERVENTIONS PLANNED: (Benefits and precautions of physical therapy have been discussed with the patient.)  1. Balance Exercise  2. Bed Mobility  3. Family Education  4. Gait Training  5. Home Exercise Program (HEP)  6. Therapeutic Activites  7. Therapeutic Exercise/Strengthening  8. Transfer Training  9. Patient Education  10.  Group Therapy     TREATMENT PLAN: Frequency/Duration: twice daily for duration of hospital stay  Rehabilitation Potential For Stated Goals: Good     RECOMMENDED REHABILITATION/EQUIPMENT: (at time of discharge pending progress): Due to the probability of continued deficits (see above) this patient will likely need continued skilled physical therapy after discharge. Equipment:    None at this time              HISTORY:   History of Present Injury/Illness (Reason for Referral):  Per MD Note: Nicki Silva is a 40 y.o.  female who presents with a long H/O low back problems. She has a known lumbar disc problem that she has treated conservatively for years. However, yesterday she was getting out of bed and felt a pop in her back. She had severe LBP and BLE pain all day, L>R. This am, she awoke with urinary incontinence, numbness in the LLE in no specific distribution, and weakness in the LLE to the point that she cannot walk. She has also developed numbness in the saddle region and most recently, while in MRI, numbness in the RLE. The pain has involved all muscle groups in BLEs. She did not feel the [place] going in just prior to this exam.      There are no active problems to display for this patient. \"  Past Medical History/Comorbidities:   Ms. Mirtha Silva  has a past medical history of Asthma and Back pain. Ms. Mirtha Silva  has a past surgical history that includes hx cyst removal and hx tympanostomy. Social History/Living Environment:   Home Environment: Private residence  # Steps to Enter: 0  One/Two Story Residence: One story  Living Alone: No  Support Systems: Spouse/Significant Other/Partner, Child(mackenzie)  Patient Expects to be Discharged to[de-identified] Unknown  Current DME Used/Available at Home: None  Prior Level of Function/Work/Activity:  Patient independent with all functional mobility at baseline.      Number of Personal Factors/Comorbidities that affect the Plan of Care: 1-2: MODERATE COMPLEXITY   EXAMINATION:   Most Recent Physical Functioning:   Gross Assessment:                  Posture:     Balance:  Sitting: Intact  Standing - Static: Fair (+)  Standing - Dynamic : Fair Bed Mobility:  Sit to Supine: Stand-by asssistance  Wheelchair Mobility:     Transfers:  Sit to Stand: Stand-by asssistance;Contact guard assistance  Stand to Sit: Stand-by asssistance  Gait:     Base of Support: Center of gravity altered; Widened  Speed/Lata: Slow  Step Length: Left shortened;Right shortened  Gait Abnormalities: Decreased step clearance; Foot drop (B)  Distance (ft): 100 Feet (ft)  Assistive Device: Walker, rolling  Ambulation - Level of Assistance: Minimal assistance;Contact guard assistance  Interventions: Safety awareness training;Verbal cues      Body Structures Involved:  1. Nerves  2. Muscles Body Functions Affected:  1. Sensory/Pain  2. Neuromusculoskeletal  3. Movement Related Activities and Participation Affected:  1. Mobility  2. Self Care  3. Domestic Life  4. Interpersonal Interactions and Relationships  5. Community, Social and [place]   Number of elements that affect the Plan of Care: 4+: HIGH COMPLEXITY   CLINICAL PRESENTATION:   Presentation: Evolving clinical presentation with changing clinical characteristics: MODERATE COMPLEXITY   CLINICAL DECISION MAKIN [facility] Inpatient Short Form  How much difficulty does the patient currently have. .. Unable A Lot A Little None   1. Turning over in bed (including adjusting bedclothes, sheets and blankets)? [] 1   [] 2   [x] 3   [] 4   2. Sitting down on and standing up from a chair with arms ( e.g., wheelchair, bedside commode, etc.)   [] 1   [] 2   [x] 3   [] 4   3. Moving from lying on back to sitting on the side of the bed? [] 1   [] 2   [x] 3   [] 4   How much help from another person does the patient currently need. .. Total A Lot A Little None   4. Moving to and from a bed to a chair (including a wheelchair)? [] 1   [x] 2   [] 3   [] 4   5. Need to walk in hospital room? [] 1   [x] 2   [] 3   [] 4   6.   Climbing 3-5 steps with a railing? [x] 1   [] 2   [] 3   [] 4   © 2007, Trustees of [address] [place] [address] [address] [id number], under license to [employer]. All rights reserved      Score:  Initial: 14 Most Recent: X (Date: -- )    Interpretation of Tool:  Represents activities that are increasingly more difficult (i.e. Bed mobility, Transfers, Gait). Score 24 23 22-20 19-15 14-10 9-7 6     Modifier CH CI CJ CK CL CM CN      ? Mobility - Walking and Moving Around:     - CURRENT STATUS: CL - 60%-79% impaired, limited or restricted    - GOAL STATUS: CK - 40%-59% impaired, limited or restricted    - D/C STATUS:  ---------------To be determined---------------  Payor: BLUE CROSS / Plan: [place] BLUE CROSS [facility] / Product Type: PPO /      Medical Necessity:     · Patient demonstrates good rehab potential due to higher previous functional level. Reason for Services/Other Comments:  · Patient continues to require modification of therapeutic interventions to increase complexity of exercises. Use of outcome tool(s) and clinical judgement create a POC that gives a: Questionable prediction of patient's progress: MODERATE COMPLEXITY            TREATMENT:      Pre-treatment Symptoms/Complaints:  none  Pain: Initial:      Post Session:  6/10     Therapeutic Activity: (   15 minutes): Therapeutic activities including bed mobility, chair transfers Ambulation on level ground and weight shifting and standing balance to improve mobility, strength, balance and coordination. Required moderate Safety awareness training;Verbal cues to promote static and dynamic balance in standing. Therapeutic Exercise: (  0 minutes):  Exercises per grid below to improve mobility, strength, balance and coordination. Required minimal visual, verbal and manual cues to promote proper body alignment. Progressed complexity of movement as indicated.        Date:  12/24/17 Date:  12/25/17 Date:  12/26/17   ACTIVITY/EXERCISE AM PM AM PM AM PM   Seated LAQ with controlled lowering  x10 BLE AROM x15 BLE AROM      Seated hip abduction  x10 BLE AAROM x15 BLE AAROM      Seated clam shells with red theraband  x10 BLE AROM x15 BLE AROM      Hamstring curls with red theraband   x10 BLE AROM      Supine heel slides      X 12 B   Supine HC stretch      X 3   Supine hip abd      X 12 B   QS      X 10 B   GS      X 10 B   sB = bilateral; AA = active assistive; A = active; P = passive      Braces/Orthotics/Lines/Etc:   · O2 Device: Room air  Treatment/Session Assessment:    · Response to Treatment:  Patient tolerated therapy well, however was tearful. · Interdisciplinary Collaboration:   o Physical Therapy Assistant  o Registered Nurse  · After treatment position/precautions:   o Supine in bed  o Bed/Chair-wheels locked  o Bed in low position  o Call light within reach  o RN notified   · Compliance with Program/Exercises: Will assess as treatment progresses. · Recommendations/Intent for next treatment session: \"Next visit will focus on advancements to more challenging activities and reduction in assistance provided\".   Total Treatment Duration:  PT Patient Time In/Time Out  Time In: 1500  Time Out: [address], PTA

## 2017-12-27 NOTE — PROGRESS NOTES
Pt doing well, no complaints. AFVSS  Neuro exam unchanged except 2/5 DF of toes on R and toe wiggle on L, improved from yesterday  Wound OK    Stable, hopefully to Rehab today.

## 2017-12-27 NOTE — PROGRESS NOTES
Problem: Falls - Risk of  Goal: *Absence of Falls  Document Judith Fall Risk and appropriate interventions in the flowsheet.    Outcome: Progressing Towards Goal  Fall Risk Interventions:  Mobility Interventions: Bed/chair exit alarm         Medication Interventions: Patient to call before getting OOB    Elimination Interventions: Call light in reach

## 2017-12-27 NOTE — PROGRESS NOTES
Problem: Falls - Risk of  Goal: *Absence of Falls  Document Judith Fall Risk and appropriate interventions in the flowsheet.    Outcome: Progressing Towards Goal  Fall Risk Interventions:  Mobility Interventions: Bed/chair exit alarm, Communicate number of staff needed for ambulation/transfer, OT consult for ADLs, Patient to call before getting OOB, PT Consult for mobility concerns, PT Consult for assist device competence         Medication Interventions: Bed/chair exit alarm, Evaluate medications/consider consulting pharmacy, Patient to call before getting OOB, Teach patient to arise slowly    Elimination Interventions: Bed/chair exit alarm, Call light in reach, Patient to call for help with toileting needs, Toilet paper/wipes in reach, Toileting schedule/hourly rounds

## 2017-12-28 PROCEDURE — 97110 THERAPEUTIC EXERCISES: CPT

## 2017-12-28 PROCEDURE — 97165 OT EVAL LOW COMPLEX 30 MIN: CPT

## 2017-12-28 PROCEDURE — 74011250637 HC RX REV CODE- 250/637: Performed by: PHYSICAL MEDICINE & REHABILITATION

## 2017-12-28 PROCEDURE — 97530 THERAPEUTIC ACTIVITIES: CPT

## 2017-12-28 PROCEDURE — 74011250636 HC RX REV CODE- 250/636: Performed by: NEUROLOGICAL SURGERY

## 2017-12-28 PROCEDURE — 65270000029 HC RM PRIVATE

## 2017-12-28 PROCEDURE — 74011250637 HC RX REV CODE- 250/637: Performed by: NEUROLOGICAL SURGERY

## 2017-12-28 PROCEDURE — 77030020186 HC BOOT HL PROTCT SAGE -B

## 2017-12-28 RX ORDER — MAGNESIUM CITRATE
296 SOLUTION, ORAL ORAL
Status: COMPLETED | OUTPATIENT
Start: 2017-12-28 | End: 2017-12-28

## 2017-12-28 RX ADMIN — DULOXETINE HYDROCHLORIDE 60 MG: 60 CAPSULE, DELAYED RELEASE ORAL at 21:32

## 2017-12-28 RX ADMIN — MAGESIUM CITRATE 296 ML: 1.75 LIQUID ORAL at 08:35

## 2017-12-28 RX ADMIN — STANDARDIZED SENNA CONCENTRATE AND DOCUSATE SODIUM 2 TABLET: 8.6; 5 TABLET, FILM COATED ORAL at 08:34

## 2017-12-28 RX ADMIN — POLYETHYLENE GLYCOL (3350) 17 G: 17 POWDER, FOR SOLUTION ORAL at 08:35

## 2017-12-28 RX ADMIN — HEPARIN SODIUM 5000 UNITS: 5000 INJECTION, SOLUTION INTRAVENOUS; SUBCUTANEOUS at 21:33

## 2017-12-28 RX ADMIN — FAMOTIDINE 20 MG: 20 TABLET, FILM COATED ORAL at 08:34

## 2017-12-28 RX ADMIN — TAMSULOSIN HYDROCHLORIDE 0.4 MG: 0.4 CAPSULE ORAL at 08:34

## 2017-12-28 RX ADMIN — HYDROCODONE BITARTRATE AND ACETAMINOPHEN 1 TABLET: 10; 325 TABLET ORAL at 15:42

## 2017-12-28 RX ADMIN — HYDROCODONE BITARTRATE AND ACETAMINOPHEN 1 TABLET: 10; 325 TABLET ORAL at 21:33

## 2017-12-28 RX ADMIN — HYDROCODONE BITARTRATE AND ACETAMINOPHEN 1 TABLET: 10; 325 TABLET ORAL at 08:39

## 2017-12-28 RX ADMIN — HEPARIN SODIUM 5000 UNITS: 5000 INJECTION, SOLUTION INTRAVENOUS; SUBCUTANEOUS at 08:34

## 2017-12-28 RX ADMIN — Medication 10 ML: at 21:34

## 2017-12-28 RX ADMIN — Medication 10 ML: at 12:15

## 2017-12-28 RX ADMIN — FAMOTIDINE 20 MG: 20 TABLET, FILM COATED ORAL at 21:32

## 2017-12-28 RX ADMIN — Medication 10 ML: at 05:22

## 2017-12-28 NOTE — PROGRESS NOTES
Problem: Falls - Risk of  Goal: *Absence of Falls  Document Judith Fall Risk and appropriate interventions in the flowsheet.    Outcome: Progressing Towards Goal  Fall Risk Interventions:  Mobility Interventions: Bed/chair exit alarm         Medication Interventions: Bed/chair exit alarm, Patient to call before getting OOB    Elimination Interventions: Bed/chair exit alarm, Call light in reach

## 2017-12-28 NOTE — PROGRESS NOTES
Problem: Mobility Impaired (Adult and Pediatric)  Goal: *Acute Goals and Plan of Care (Insert Text)  STG:  (1.)Ms. Noreen Hamilton will move from supine to sit and sit to supine , scoot up and down and roll side to side with CONTACT GUARD ASSIST within 3 day(s). (2.)Ms. Noreen Hamilton will transfer from bed to chair and chair to bed with CONTACT GUARD ASSIST using the least restrictive device within 3 day(s). (3.)Ms. Noreen Hamilton will ambulate with CONTACT GUARD ASSIST for 15 feet with the least restrictive device within 3 day(s). (4.)Ms. Noreen Hamilton will perform standing static and dynamic balance activities x 15 minutes with CONTACT GUARD ASSIST to improve safety within 3 day(s). LTG:  (1.)Ms. Noreen Hamilton will move from supine to sit and sit to supine , scoot up and down and roll side to side in bed with STAND BY ASSIST within 7 day(s). (2.)Ms. Noreen Hamilton will transfer from bed to chair and chair to bed with STAND BY ASSIST using the least restrictive device within 7 day(s). (3.)Ms. Noreen Hamilton will ambulate with STAND BY ASSIST for 45 feet with the least restrictive device within 7 day(s).   ________________________________________________________________________________________________      PHYSICAL THERAPY: Daily Note, Treatment Day: 4th, PM 12/28/2017  INPATIENT: Hospital Day: 6  Payor: Sanjay Ingram / Plan: [facility] / Product Type: PPO /      NAME/AGE/GENDER: Ruma Ho is a 40 y.o. female   PRIMARY DIAGNOSIS: BULGING DISC  Cauda equina syndrome with neurogenic bladder (HCC)  Cauda equina syndrome with neurogenic bladder ([address] <principal problem not specified> <principal problem not specified>  Procedure(s) (LRB):  SPINE LUMBAR LAMINECTOMY MINIMALLY INVASIVE LEFT SIDE (Left)  5 Days Post-Op  ICD-10: Treatment Diagnosis:   · Difficulty in walking, Not elsewhere classified (R26.2)   Precaution/Allergies:  Doxycycline; Pcn [penicillins]; and Percocet [oxycodone-acetaminophen]      ASSESSMENT:     Ms. Noreen Hamilton is a 40 y.o. female with cauda equina syndrome and s/p above surgery. 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 Pt presents supine in bed and performed supine to sit with SBA, with good log rolling technique. Manual assistance applied to only the L LE with theraband to place patient in increased dorsiflexion to assist with normalized gait pattern during gait. Pt stood and was able to ambulate about 110' with rolling walker and CGA/min assist.  Pt continues working on heel strike, more so on the R foot now that we're not using the theraband on it, as well as looking forward during ambulation. pt returned to chair and was left with needs in reach. Pt puts forth great effort with therapy. Good progress. Will continue with POC. This section established at most recent assessment   PROBLEM LIST (Impairments causing functional limitations):  1. Decreased Strength  2. Decreased ADL/Functional Activities  3. Decreased Transfer Abilities  4. Decreased Ambulation Ability/Technique  5. Decreased Balance  6. Increased Pain  7. Decreased Knowledge of Precautions  8. Decreased [place] with Home Exercise Program   INTERVENTIONS PLANNED: (Benefits and precautions of physical therapy have been discussed with the patient.)  1. Balance Exercise  2. Bed Mobility  3. Family Education  4. Gait Training  5. Home Exercise Program (HEP)  6. Therapeutic Activites  7. Therapeutic Exercise/Strengthening  8. Transfer Training  9. Patient Education  10.  Group Therapy     TREATMENT PLAN: Frequency/Duration: twice daily for duration of hospital stay  Rehabilitation Potential For Stated Goals: Good     RECOMMENDED REHABILITATION/EQUIPMENT: (at time of discharge pending progress): Due to the probability of continued deficits (see above) this patient will likely need continued skilled physical therapy after discharge. Equipment:    None at this time              HISTORY:   History of Present Injury/Illness (Reason for Referral):  Per MD Note: Marc Blake is a 40 y.o.  female who presents with a long H/O low back problems. She has a known lumbar disc problem that she has treated conservatively for years. However, yesterday she was getting out of bed and felt a pop in her back. She had severe LBP and BLE pain all day, L>R. This am, she awoke with urinary incontinence, numbness in the LLE in no specific distribution, and weakness in the LLE to the point that she cannot walk. She has also developed numbness in the saddle region and most recently, while in MRI, numbness in the RLE. The pain has involved all muscle groups in BLEs. She did not feel the Wilson going in just prior to this exam.      There are no active problems to display for this patient. \"  Past Medical History/Comorbidities:   Ms. Karli Blake  has a past medical history of Asthma and Back pain. Ms. Karli Blake  has a past surgical history that includes hx cyst removal and hx tympanostomy. Social History/Living Environment:   Home Environment: Private residence  # Steps to Enter: 0  One/Two Story Residence: One story  Living Alone: No  Support Systems: Spouse/Significant Other/Partner, Child(mackenzie)  Patient Expects to be Discharged to[de-identified] Unknown  Current DME Used/Available at Home: None  Tub or Shower Type: Tub/Shower combination  Prior Level of Function/Work/Activity:  Patient independent with all functional mobility at baseline.      Number of Personal Factors/Comorbidities that affect the Plan of Care: 1-2: MODERATE COMPLEXITY   EXAMINATION:   Most Recent Physical Functioning:   Gross Assessment:                  Posture:     Balance:  Sitting: Intact  Standing - Static: Fair (+)  Standing - Dynamic : Fair Bed Mobility:  Sit to Supine: Supervision  Scooting: Supervision  Wheelchair Mobility:     Transfers:  Sit to Stand: Stand-by asssistance  Stand to Sit: Stand-by asssistance  Gait:     Base of Support: Center of gravity altered; Widened  Speed/Lata: Pace decreased (<100 feet/min)  Step Length: Left shortened;Right shortened  Gait Abnormalities: Decreased step clearance; Foot drop (B)  Distance (ft): 110 Feet (ft)  Assistive Device: Walker, rolling  Ambulation - Level of Assistance: Contact guard assistance  Interventions: Safety awareness training;Verbal cues      Body Structures Involved:  1. Nerves  2. Muscles Body Functions Affected:  1. Sensory/Pain  2. Neuromusculoskeletal  3. Movement Related Activities and Participation Affected:  1. Mobility  2. Self Care  3. Domestic Life  4. Interpersonal Interactions and Relationships  5. Community, Social and [place]   Number of elements that affect the Plan of Care: 4+: HIGH COMPLEXITY   CLINICAL PRESENTATION:   Presentation: Evolving clinical presentation with changing clinical characteristics: MODERATE COMPLEXITY   CLINICAL DECISION MAKIN [facility] Inpatient Short Form  How much difficulty does the patient currently have. .. Unable A Lot A Little None   1. Turning over in bed (including adjusting bedclothes, sheets and blankets)? [] 1   [] 2   [x] 3   [] 4   2. Sitting down on and standing up from a chair with arms ( e.g., wheelchair, bedside commode, etc.)   [] 1   [] 2   [x] 3   [] 4   3. Moving from lying on back to sitting on the side of the bed? [] 1   [] 2   [x] 3   [] 4   How much help from another person does the patient currently need. .. Total A Lot A Little None   4. Moving to and from a bed to a chair (including a wheelchair)? [] 1   [x] 2   [] 3   [] 4   5. Need to walk in hospital room? [] 1   [x] 2   [] 3   [] 4   6. Climbing 3-5 steps with a railing?    [x] 1   [] 2   [] 3   [] 4   © , Trustees of [employer], under license to [employer]. All rights reserved      Score:  Initial: 14 Most Recent: X (Date: -- )    Interpretation of Tool:  Represents activities that are increasingly more difficult (i.e. Bed mobility, Transfers, Gait). Score 24 23 22-20 19-15 14-10 9-7 6     Modifier CH CI CJ CK CL CM CN      ? Mobility - Walking and Moving Around:     - CURRENT STATUS: CL - 60%-79% impaired, limited or restricted    - GOAL STATUS: CK - 40%-59% impaired, limited or restricted    - D/C STATUS:  ---------------To be determined---------------  Payor: BLUE CROSS / Plan: [place] [facility] [facility] / Product Type: PPO /      Medical Necessity:     · Patient demonstrates good rehab potential due to higher previous functional level. Reason for Services/Other Comments:  · Patient continues to require modification of therapeutic interventions to increase complexity of exercises. Use of outcome tool(s) and clinical judgement create a POC that gives a: Questionable prediction of patient's progress: MODERATE COMPLEXITY            TREATMENT:      Pre-treatment Symptoms/Complaints:  none  Pain: Initial:      Post Session:  6/10     Therapeutic Activity: (   15 minutes): Therapeutic activities including bed mobility, chair transfers Ambulation on level ground and weight shifting and standing balance to improve mobility, strength, balance and coordination. Required moderate Safety awareness training;Verbal cues to promote static and dynamic balance in standing. Therapeutic Exercise: (  8 minutes):  Exercises per grid below to improve mobility, strength, balance and coordination. Required minimal visual, verbal and manual cues to promote proper body alignment. Progressed complexity of movement as indicated.        Date:  12/24/17 Date:  12/25/17 Date:  12/26/17 Date:  12/28/17   ACTIVITY/EXERCISE AM PM AM PM AM PM AM   Seated LAQ with controlled lowering  x10 BLE AROM x15 BLE AROM    X 15 B Seated hip abduction  x10 BLE AAROM x15 BLE AAROM       Seated clam shells with red theraband  x10 BLE AROM x15 BLE AROM    X 15 B   Hamstring curls with red theraband   x10 BLE AROM       Supine heel slides      X 12 B    Supine HC stretch      X 3    Supine hip abd      X 12 B    QS      X 10 B    GS      X 10 B    Standing hip abd       X 10 B   Standing hip flexion       X 10 B             sB = bilateral; AA = active assistive; A = active; P = passive      Braces/Orthotics/Lines/Etc:   · O2 Device: Room air  Treatment/Session Assessment:    · Response to Treatment:  Patient tolerated therapy well. · Interdisciplinary Collaboration:   o Physical Therapy Assistant  o Registered Nurse  · After treatment position/precautions:   o Up in chair  o Bed/Chair-wheels locked  o Bed in low position  o Call light within reach  o RN notified   · Compliance with Program/Exercises: Will assess as treatment progresses. · Recommendations/Intent for next treatment session: \"Next visit will focus on advancements to more challenging activities and reduction in assistance provided\".   Total Treatment Duration:  PT Patient Time In/Time Out  Time In: 1540  Time Out: 3236 Luigi Aceves PTA

## 2017-12-28 NOTE — PROGRESS NOTES
Problem: Mobility Impaired (Adult and Pediatric)  Goal: *Acute Goals and Plan of Care (Insert Text)  STG:  (1.)Ms. Leon Aguilera will move from supine to sit and sit to supine , scoot up and down and roll side to side with CONTACT GUARD ASSIST within 3 day(s). (2.)Ms. Leon Aguilera will transfer from bed to chair and chair to bed with CONTACT GUARD ASSIST using the least restrictive device within 3 day(s). (3.)Ms. Leon Aguilera will ambulate with CONTACT GUARD ASSIST for 15 feet with the least restrictive device within 3 day(s). (4.)Ms. Leon Aguilrea will perform standing static and dynamic balance activities x 15 minutes with CONTACT GUARD ASSIST to improve safety within 3 day(s). LTG:  (1.)Ms. Leon Aguilera will move from supine to sit and sit to supine , scoot up and down and roll side to side in bed with STAND BY ASSIST within 7 day(s). (2.)Ms. Leon Aguilera will transfer from bed to chair and chair to bed with STAND BY ASSIST using the least restrictive device within 7 day(s). (3.)Ms. Leon Aguilera will ambulate with STAND BY ASSIST for 45 feet with the least restrictive device within 7 day(s).   ________________________________________________________________________________________________      PHYSICAL THERAPY: Daily Note, Treatment Day: 4th, AM 12/28/2017  INPATIENT: Hospital Day: 6  Payor: Radha Addison / Plan: [facility] / Product Type: PPO /      NAME/AGE/GENDER: Matilde Webster is a 40 y.o. female   PRIMARY DIAGNOSIS: BULGING DISC  Cauda equina syndrome with neurogenic bladder (HCC)  Cauda equina syndrome with neurogenic bladder ([address] <principal problem not specified> <principal problem not specified>  Procedure(s) (LRB):  SPINE LUMBAR LAMINECTOMY MINIMALLY INVASIVE LEFT SIDE (Left)  5 Days Post-Op  ICD-10: Treatment Diagnosis:   · Difficulty in walking, Not elsewhere classified (R26.2)   Precaution/Allergies:  Doxycycline; Pcn [penicillins]; and Percocet [oxycodone-acetaminophen]      ASSESSMENT:     Ms. Leon Aguilera is a 40 y.o. female with cauda equina syndrome and s/p above surgery. 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 Pt presents sitting up in chair and is very agreeable to therapy. Pt reports doing her exercises earlier today. Pt with slightly more dorsiflexion R LE noted. Manual assistance applied to only the L LE with theraband to place patient in increased dorsiflexion to assist with normalized gait pattern during gait. Pt was able to stand with SBA and ambulated 100' with rolling walker and CGA. Pt with a smoother gait pattern and slightly increased gait speed was maintained safety. Improvement noted with turning today having increased balance. Pt continues working on heel strike and looking forward during ambulation. pt returned to chair, rested, and then performed below exercises. Pt then returned supine in bed and was left with needs in reach. Pt puts forth great effort with therapy. Will continue with POC. This section established at most recent assessment   PROBLEM LIST (Impairments causing functional limitations):  1. Decreased Strength  2. Decreased ADL/Functional Activities  3. Decreased Transfer Abilities  4. Decreased Ambulation Ability/Technique  5. Decreased Balance  6. Increased Pain  7. Decreased Knowledge of Precautions  8. Decreased [place] with Home Exercise Program   INTERVENTIONS PLANNED: (Benefits and precautions of physical therapy have been discussed with the patient.)  1. Balance Exercise  2. Bed Mobility  3. Family Education  4. Gait Training  5. Home Exercise Program (HEP)  6. Therapeutic Activites  7. Therapeutic Exercise/Strengthening  8. Transfer Training  9. Patient Education  10.  Group Therapy     TREATMENT PLAN: Frequency/Duration: twice daily for duration of hospital stay  Rehabilitation Potential For Stated Goals: Good     RECOMMENDED REHABILITATION/EQUIPMENT: (at time of discharge pending progress): Due to the probability of continued deficits (see above) this patient will likely need continued skilled physical therapy after discharge. Equipment:    None at this time              HISTORY:   History of Present Injury/Illness (Reason for Referral):  Per MD Note: Gennie Galeazzi Ivy Pouch is a 40 y.o.  female who presents with a long H/O low back problems. She has a known lumbar disc problem that she has treated conservatively for years. However, yesterday she was getting out of bed and felt a pop in her back. She had severe LBP and BLE pain all day, L>R. This am, she awoke with urinary incontinence, numbness in the LLE in no specific distribution, and weakness in the LLE to the point that she cannot walk. She has also developed numbness in the saddle region and most recently, while in MRI, numbness in the RLE. The pain has involved all muscle groups in BLEs. She did not feel the Wilson going in just prior to this exam.      There are no active problems to display for this patient. \"  Past Medical History/Comorbidities:   Ms. Reena Ramirez  has a past medical history of Asthma and Back pain. Ms. Reena Ramirez  has a past surgical history that includes hx cyst removal and hx tympanostomy. Social History/Living Environment:   Home Environment: Private residence  # Steps to Enter: 0  One/Two Story Residence: One story  Living Alone: No  Support Systems: Spouse/Significant Other/Partner, Child(mackenzie)  Patient Expects to be Discharged to[de-identified] Unknown  Current DME Used/Available at Home: None  Tub or Shower Type: Tub/Shower combination  Prior Level of Function/Work/Activity:  Patient independent with all functional mobility at baseline.      Number of Personal Factors/Comorbidities that affect the Plan of Care: 1-2: MODERATE COMPLEXITY EXAMINATION:   Most Recent Physical Functioning:   Gross Assessment:                  Posture:     Balance:  Sitting: Intact  Standing - Static: Fair (+)  Standing - Dynamic : Fair Bed Mobility:  Sit to Supine: Supervision  Scooting: Supervision  Wheelchair Mobility:     Transfers:  Sit to Stand: Stand-by asssistance  Stand to Sit: Stand-by asssistance  Gait:     Base of Support: Center of gravity altered; Widened  Speed/Lata: Pace decreased (<100 feet/min)  Step Length: Left shortened;Right shortened  Gait Abnormalities: Decreased step clearance; Foot drop (B)  Distance (ft): 100 Feet (ft)  Assistive Device: Walker, rolling  Ambulation - Level of Assistance: Contact guard assistance  Interventions: Safety awareness training;Verbal cues      Body Structures Involved:  1. Nerves  2. Muscles Body Functions Affected:  1. Sensory/Pain  2. Neuromusculoskeletal  3. Movement Related Activities and Participation Affected:  1. Mobility  2. Self Care  3. Domestic Life  4. Interpersonal Interactions and Relationships  5. Community, Social and [place]   Number of elements that affect the Plan of Care: 4+: HIGH COMPLEXITY   CLINICAL PRESENTATION:   Presentation: Evolving clinical presentation with changing clinical characteristics: MODERATE COMPLEXITY   CLINICAL DECISION MAKIN [facility] Mobility Inpatient Short Form  How much difficulty does the patient currently have. .. Unable A Lot A Little None   1. Turning over in bed (including adjusting bedclothes, sheets and blankets)? [] 1   [] 2   [x] 3   [] 4   2. Sitting down on and standing up from a chair with arms ( e.g., wheelchair, bedside commode, etc.)   [] 1   [] 2   [x] 3   [] 4   3. Moving from lying on back to sitting on the side of the bed? [] 1   [] 2   [x] 3   [] 4   How much help from another person does the patient currently need. .. Total A Lot A Little None   4.   Moving to and from a bed to a chair (including a wheelchair)? [] 1   [x] 2   [] 3   [] 4   5. Need to walk in hospital room? [] 1   [x] 2   [] 3   [] 4   6. Climbing 3-5 steps with a railing? [x] 1   [] 2   [] 3   [] 4   © 2007, Trustees of [address] [place] [address] [address] [address], under license to [employer]. All rights reserved      Score:  Initial: 14 Most Recent: X (Date: -- )    Interpretation of Tool:  Represents activities that are increasingly more difficult (i.e. Bed mobility, Transfers, Gait). Score 24 23 22-20 19-15 14-10 9-7 6     Modifier CH CI CJ CK CL CM CN      ? Mobility - Walking and Moving Around:     - CURRENT STATUS: CL - 60%-79% impaired, limited or restricted    - GOAL STATUS: CK - 40%-59% impaired, limited or restricted    - D/C STATUS:  ---------------To be determined---------------  Payor: BLUE CROSS / Plan: [place] [employer] [place] / Product Type: PPO /      Medical Necessity:     · Patient demonstrates good rehab potential due to higher previous functional level. Reason for Services/Other Comments:  · Patient continues to require modification of therapeutic interventions to increase complexity of exercises. Use of outcome tool(s) and clinical judgement create a POC that gives a: Questionable prediction of patient's progress: MODERATE COMPLEXITY            TREATMENT:      Pre-treatment Symptoms/Complaints:  none  Pain: Initial:      Post Session:  6/10     Therapeutic Activity: (   15 minutes): Therapeutic activities including bed mobility, chair transfers Ambulation on level ground and weight shifting and standing balance to improve mobility, strength, balance and coordination. Required moderate Safety awareness training;Verbal cues to promote static and dynamic balance in standing. Therapeutic Exercise: (  8 minutes):  Exercises per grid below to improve mobility, strength, balance and coordination. Required minimal visual, verbal and manual cues to promote proper body alignment.   Progressed complexity of movement as indicated. Date:  12/24/17 Date:  12/25/17 Date:  12/26/17 Date:  12/28/17   ACTIVITY/EXERCISE AM PM AM PM AM PM AM   Seated LAQ with controlled lowering  x10 BLE AROM x15 BLE AROM    X 15 B   Seated hip abduction  x10 BLE AAROM x15 BLE AAROM       Seated clam shells with red theraband  x10 BLE AROM x15 BLE AROM    X 15 B   Hamstring curls with red theraband   x10 BLE AROM       Supine heel slides      X 12 B    Supine HC stretch      X 3    Supine hip abd      X 12 B    QS      X 10 B    GS      X 10 B    Standing hip abd       X 10 B   Standing hip flexion       X 10 B             sB = bilateral; AA = active assistive; A = active; P = passive      Braces/Orthotics/Lines/Etc:   · O2 Device: Room air  Treatment/Session Assessment:    · Response to Treatment:  Patient tolerated therapy well. · Interdisciplinary Collaboration:   o Physical Therapy Assistant  o Registered Nurse  · After treatment position/precautions:   o Supine in bed  o Bed/Chair-wheels locked  o Bed in low position  o Call light within reach  o RN notified   · Compliance with Program/Exercises: Will assess as treatment progresses. · Recommendations/Intent for next treatment session: \"Next visit will focus on advancements to more challenging activities and reduction in assistance provided\".   Total Treatment Duration:  PT Patient Time In/Time Out  Time In: 1257  Time Out: Tabatha Quick 366, PTA

## 2017-12-28 NOTE — PROGRESS NOTES
Pt doing well, no new problems. AFVSS  Neuro exam unchanged except 2/5 B DF today! Wound OK    Stable, beginning to regain neurologic function. Awaiting rehab placement.

## 2017-12-29 ENCOUNTER — HOSPITAL ENCOUNTER (INPATIENT)
Age: 37
LOS: 7 days | Discharge: HOME | DRG: 560 | End: 2018-01-05
Attending: PHYSICAL MEDICINE & REHABILITATION | Admitting: PHYSICAL MEDICINE & REHABILITATION
Payer: COMMERCIAL

## 2017-12-29 VITALS
OXYGEN SATURATION: 96 % | SYSTOLIC BLOOD PRESSURE: 108 MMHG | WEIGHT: 195 LBS | TEMPERATURE: 98.7 F | BODY MASS INDEX: 31.34 KG/M2 | RESPIRATION RATE: 17 BRPM | DIASTOLIC BLOOD PRESSURE: 67 MMHG | HEIGHT: 66 IN | HEART RATE: 88 BPM

## 2017-12-29 DIAGNOSIS — K59.2 NEUROGENIC BOWEL: ICD-10-CM

## 2017-12-29 DIAGNOSIS — M51.16 LUMBAR DISC HERNIATION WITH RADICULOPATHY: ICD-10-CM

## 2017-12-29 DIAGNOSIS — B37.49 CANDIDA INFECTION OF GENITAL REGION: Primary | ICD-10-CM

## 2017-12-29 DIAGNOSIS — G83.4 CAUDA EQUINA SYNDROME WITH NEUROGENIC BLADDER (HCC): ICD-10-CM

## 2017-12-29 DIAGNOSIS — G82.22 INCOMPLETE PARAPLEGIA (HCC): ICD-10-CM

## 2017-12-29 DIAGNOSIS — G83.4 CAUDA EQUINA SYNDROME (HCC): ICD-10-CM

## 2017-12-29 LAB
APPEARANCE UR: NORMAL
BILIRUB UR QL: NEGATIVE
COLOR UR: YELLOW
GLUCOSE UR STRIP.AUTO-MCNC: NEGATIVE MG/DL
HGB UR QL STRIP: NEGATIVE
KETONES UR QL STRIP.AUTO: NEGATIVE MG/DL
LEUKOCYTE ESTERASE UR QL STRIP.AUTO: NEGATIVE
NITRITE UR QL STRIP.AUTO: NEGATIVE
PH UR STRIP: 7 [PH] (ref 5–9)
PROT UR STRIP-MCNC: NEGATIVE MG/DL
SP GR UR REFRACTOMETRY: 1.01 (ref 1–1.02)
UROBILINOGEN UR QL STRIP.AUTO: 0.2 EU/DL (ref 0.2–1)

## 2017-12-29 PROCEDURE — 74011250637 HC RX REV CODE- 250/637: Performed by: PHYSICAL MEDICINE & REHABILITATION

## 2017-12-29 PROCEDURE — 74011250636 HC RX REV CODE- 250/636: Performed by: PHYSICAL MEDICINE & REHABILITATION

## 2017-12-29 PROCEDURE — 87086 URINE CULTURE/COLONY COUNT: CPT | Performed by: PHYSICAL MEDICINE & REHABILITATION

## 2017-12-29 PROCEDURE — 77030020186 HC BOOT HL PROTCT SAGE -B

## 2017-12-29 PROCEDURE — 65310000000 HC RM PRIVATE REHAB

## 2017-12-29 PROCEDURE — 74011250637 HC RX REV CODE- 250/637: Performed by: NEUROLOGICAL SURGERY

## 2017-12-29 PROCEDURE — 99223 1ST HOSP IP/OBS HIGH 75: CPT | Performed by: PHYSICAL MEDICINE & REHABILITATION

## 2017-12-29 PROCEDURE — 81003 URINALYSIS AUTO W/O SCOPE: CPT | Performed by: PHYSICAL MEDICINE & REHABILITATION

## 2017-12-29 PROCEDURE — 74011250636 HC RX REV CODE- 250/636: Performed by: NEUROLOGICAL SURGERY

## 2017-12-29 RX ORDER — TEMAZEPAM 15 MG/1
30 CAPSULE ORAL
Status: CANCELLED | OUTPATIENT
Start: 2017-12-29

## 2017-12-29 RX ORDER — POLYETHYLENE GLYCOL 3350 17 G/17G
17 POWDER, FOR SOLUTION ORAL DAILY
Status: DISCONTINUED | OUTPATIENT
Start: 2017-12-30 | End: 2018-01-05 | Stop reason: HOSPADM

## 2017-12-29 RX ORDER — TRAMADOL HYDROCHLORIDE 50 MG/1
50 TABLET ORAL
Status: DISCONTINUED | OUTPATIENT
Start: 2017-12-29 | End: 2018-01-05 | Stop reason: HOSPADM

## 2017-12-29 RX ORDER — TEMAZEPAM 15 MG/1
30 CAPSULE ORAL
Status: DISCONTINUED | OUTPATIENT
Start: 2017-12-29 | End: 2018-01-05 | Stop reason: HOSPADM

## 2017-12-29 RX ORDER — FACIAL-BODY WIPES
10 EACH TOPICAL DAILY PRN
Status: CANCELLED | OUTPATIENT
Start: 2017-12-29

## 2017-12-29 RX ORDER — ONDANSETRON 4 MG/1
8 TABLET, ORALLY DISINTEGRATING ORAL
Status: DISCONTINUED | OUTPATIENT
Start: 2017-12-29 | End: 2018-01-05 | Stop reason: HOSPADM

## 2017-12-29 RX ORDER — ADHESIVE BANDAGE
30 BANDAGE TOPICAL DAILY PRN
Status: CANCELLED | OUTPATIENT
Start: 2017-12-29

## 2017-12-29 RX ORDER — ONDANSETRON 4 MG/1
8 TABLET, ORALLY DISINTEGRATING ORAL
Status: CANCELLED | OUTPATIENT
Start: 2017-12-29

## 2017-12-29 RX ORDER — HYDROCODONE BITARTRATE AND ACETAMINOPHEN 10; 325 MG/1; MG/1
1 TABLET ORAL
Status: DISCONTINUED | OUTPATIENT
Start: 2017-12-29 | End: 2018-01-05 | Stop reason: HOSPADM

## 2017-12-29 RX ORDER — TAMSULOSIN HYDROCHLORIDE 0.4 MG/1
0.4 CAPSULE ORAL DAILY
Status: CANCELLED | OUTPATIENT
Start: 2017-12-30

## 2017-12-29 RX ORDER — HEPARIN SODIUM 5000 [USP'U]/ML
5000 INJECTION, SOLUTION INTRAVENOUS; SUBCUTANEOUS EVERY 12 HOURS
Status: DISCONTINUED | OUTPATIENT
Start: 2017-12-29 | End: 2018-01-05 | Stop reason: HOSPADM

## 2017-12-29 RX ORDER — FACIAL-BODY WIPES
10 EACH TOPICAL DAILY PRN
Status: DISCONTINUED | OUTPATIENT
Start: 2017-12-29 | End: 2017-12-29 | Stop reason: HOSPADM

## 2017-12-29 RX ORDER — ADHESIVE BANDAGE
30 BANDAGE TOPICAL DAILY PRN
Status: DISCONTINUED | OUTPATIENT
Start: 2017-12-29 | End: 2018-01-05 | Stop reason: HOSPADM

## 2017-12-29 RX ORDER — SIMETHICONE 80 MG
80 TABLET,CHEWABLE ORAL
Status: DISCONTINUED | OUTPATIENT
Start: 2017-12-29 | End: 2018-01-05 | Stop reason: HOSPADM

## 2017-12-29 RX ORDER — ACETAMINOPHEN 325 MG/1
650 TABLET ORAL
Status: CANCELLED | OUTPATIENT
Start: 2017-12-29

## 2017-12-29 RX ORDER — TAMSULOSIN HYDROCHLORIDE 0.4 MG/1
0.4 CAPSULE ORAL DAILY
Status: DISCONTINUED | OUTPATIENT
Start: 2017-12-30 | End: 2018-01-05 | Stop reason: HOSPADM

## 2017-12-29 RX ORDER — MAG HYDROX/ALUMINUM HYD/SIMETH 200-200-20
30 SUSPENSION, ORAL (FINAL DOSE FORM) ORAL
Status: CANCELLED | OUTPATIENT
Start: 2017-12-29

## 2017-12-29 RX ORDER — SIMETHICONE 80 MG
80 TABLET,CHEWABLE ORAL
Status: CANCELLED | OUTPATIENT
Start: 2017-12-29

## 2017-12-29 RX ORDER — DULOXETIN HYDROCHLORIDE 30 MG/1
60 CAPSULE, DELAYED RELEASE ORAL
Status: DISCONTINUED | OUTPATIENT
Start: 2017-12-29 | End: 2018-01-05 | Stop reason: HOSPADM

## 2017-12-29 RX ORDER — ACETAMINOPHEN 325 MG/1
650 TABLET ORAL
Status: DISCONTINUED | OUTPATIENT
Start: 2017-12-29 | End: 2018-01-05 | Stop reason: HOSPADM

## 2017-12-29 RX ORDER — MAG HYDROX/ALUMINUM HYD/SIMETH 200-200-20
30 SUSPENSION, ORAL (FINAL DOSE FORM) ORAL
Status: DISCONTINUED | OUTPATIENT
Start: 2017-12-29 | End: 2018-01-05 | Stop reason: HOSPADM

## 2017-12-29 RX ORDER — FAMOTIDINE 20 MG/1
20 TABLET, FILM COATED ORAL EVERY 12 HOURS
Status: CANCELLED | OUTPATIENT
Start: 2017-12-29

## 2017-12-29 RX ORDER — AMOXICILLIN 250 MG
2 CAPSULE ORAL DAILY
Status: CANCELLED | OUTPATIENT
Start: 2017-12-30

## 2017-12-29 RX ORDER — HEPARIN SODIUM 5000 [USP'U]/ML
5000 INJECTION, SOLUTION INTRAVENOUS; SUBCUTANEOUS EVERY 12 HOURS
Status: CANCELLED | OUTPATIENT
Start: 2017-12-29

## 2017-12-29 RX ORDER — DULOXETIN HYDROCHLORIDE 60 MG/1
60 CAPSULE, DELAYED RELEASE ORAL
Status: CANCELLED | OUTPATIENT
Start: 2017-12-29

## 2017-12-29 RX ORDER — HYDROCODONE BITARTRATE AND ACETAMINOPHEN 10; 325 MG/1; MG/1
1 TABLET ORAL
Status: CANCELLED | OUTPATIENT
Start: 2017-12-29

## 2017-12-29 RX ORDER — FLUCONAZOLE 100 MG/1
200 TABLET ORAL DAILY
Status: DISCONTINUED | OUTPATIENT
Start: 2017-12-29 | End: 2018-01-03

## 2017-12-29 RX ORDER — FAMOTIDINE 20 MG/1
20 TABLET, FILM COATED ORAL EVERY 12 HOURS
Status: DISCONTINUED | OUTPATIENT
Start: 2017-12-29 | End: 2018-01-05 | Stop reason: HOSPADM

## 2017-12-29 RX ORDER — POLYETHYLENE GLYCOL 3350 17 G/17G
17 POWDER, FOR SOLUTION ORAL DAILY
Status: CANCELLED | OUTPATIENT
Start: 2017-12-30

## 2017-12-29 RX ORDER — AMOXICILLIN 250 MG
2 CAPSULE ORAL DAILY
Status: DISCONTINUED | OUTPATIENT
Start: 2017-12-30 | End: 2018-01-05 | Stop reason: HOSPADM

## 2017-12-29 RX ORDER — TRAMADOL HYDROCHLORIDE 50 MG/1
50 TABLET ORAL
Status: CANCELLED | OUTPATIENT
Start: 2017-12-29

## 2017-12-29 RX ORDER — FACIAL-BODY WIPES
10 EACH TOPICAL DAILY PRN
Status: DISCONTINUED | OUTPATIENT
Start: 2017-12-29 | End: 2018-01-05 | Stop reason: HOSPADM

## 2017-12-29 RX ADMIN — FAMOTIDINE 20 MG: 20 TABLET, FILM COATED ORAL at 21:44

## 2017-12-29 RX ADMIN — HYDROCODONE BITARTRATE AND ACETAMINOPHEN 1 TABLET: 10; 325 TABLET ORAL at 21:50

## 2017-12-29 RX ADMIN — HYDROCODONE BITARTRATE AND ACETAMINOPHEN 1 TABLET: 10; 325 TABLET ORAL at 17:27

## 2017-12-29 RX ADMIN — FAMOTIDINE 20 MG: 20 TABLET, FILM COATED ORAL at 08:33

## 2017-12-29 RX ADMIN — STANDARDIZED SENNA CONCENTRATE AND DOCUSATE SODIUM 2 TABLET: 8.6; 5 TABLET, FILM COATED ORAL at 08:33

## 2017-12-29 RX ADMIN — DULOXETINE HYDROCHLORIDE 60 MG: 30 CAPSULE, DELAYED RELEASE ORAL at 21:44

## 2017-12-29 RX ADMIN — Medication 10 ML: at 05:58

## 2017-12-29 RX ADMIN — HYDROCODONE BITARTRATE AND ACETAMINOPHEN 1 TABLET: 10; 325 TABLET ORAL at 05:58

## 2017-12-29 RX ADMIN — HYDROCODONE BITARTRATE AND ACETAMINOPHEN 1 TABLET: 10; 325 TABLET ORAL at 11:00

## 2017-12-29 RX ADMIN — HEPARIN SODIUM 5000 UNITS: 5000 INJECTION, SOLUTION INTRAVENOUS; SUBCUTANEOUS at 21:45

## 2017-12-29 RX ADMIN — FLUCONAZOLE 200 MG: 100 TABLET ORAL at 17:27

## 2017-12-29 RX ADMIN — HEPARIN SODIUM 5000 UNITS: 5000 INJECTION, SOLUTION INTRAVENOUS; SUBCUTANEOUS at 08:40

## 2017-12-29 RX ADMIN — POLYETHYLENE GLYCOL (3350) 17 G: 17 POWDER, FOR SOLUTION ORAL at 08:33

## 2017-12-29 RX ADMIN — BISACODYL 10 MG: 10 SUPPOSITORY RECTAL at 18:15

## 2017-12-29 RX ADMIN — TAMSULOSIN HYDROCHLORIDE 0.4 MG: 0.4 CAPSULE ORAL at 08:33

## 2017-12-29 NOTE — IP AVS SNAPSHOT
[address] [facility], operated [facility] 
 
 
 [address] [facility] 322 [facility] 
[phone] Patient: Caesar Fernandez MRN: [id number] TMM:[id number] About your hospitalization You were admitted on:  December 29, 2017 You last received care in the:  [facility] 9 INPATIENT REHAB UNIT You were discharged on:  January 5, 2018 Why you were hospitalized Your primary diagnosis was: Incomplete Paraplegia (Hcc) Your diagnoses also included:  Cauda Equina Syndrome With Neurogenic Bladder (Hcc) Follow-up Information Follow up With Details Comments Contact Info [facility]   PT TO MAKE  APPOINTMENT  WHEN SHE GETS HOME WITH PCP [facility] [address] . [place] [address] Kathryn Montalvo MD On 1/18/2018 APPOINTMENT IS FOR 9:00  [facility] [place] [address] [facility] [place] [address] 
[phone] Myah Mueller MD On 2/21/2018 APPOINTMENT IS FOR 10:00 [address] [facility] [address] [facility] [address] 
[phone] [facility] Physical Therapy On 1/9/2018 @ 9:45 AM. 721 [facility] 
[phone] Your Scheduled Appointments Thursday January 18, 2018  9:00 AM EST  
WOUND CHECK with [facility] NURSE  
[facility] ([facility] [address] [address] [place] [address]  
[phone] Discharge Orders None A check andrew indicates which time of day the medication should be taken. My Medications START taking these medications Instructions Each Dose to Equal  
 Morning Noon Evening Bedtime  
 bisacodyl 10 mg suppository Commonly known as:  DULCOLAX Your last dose was: Your next dose is: Insert 10 mg into rectum daily as needed. 10 mg  
    
   
   
   
  
 polyethylene glycol 17 gram packet Commonly known as:  Little Jensen Your last dose was: Your next dose is: Take 1 Packet by mouth daily. 17 g CHANGE how you take these medications Instructions Each Dose to Equal  
 Morning Noon Evening Bedtime * NORCO  mg tablet Generic drug:  HYDROcodone-acetaminophen What changed:  Another medication with the same name was added. Make sure you understand how and when to take each. Your last dose was: Your next dose is: Take 1 Tab by mouth. 1 Tab  
    
   
   
   
  
 * HYDROcodone-acetaminophen  mg tablet Commonly known as:  Juan Sorensen What changed: You were already taking a medication with the same name, and this prescription was added. Make sure you understand how and when to take each. Your last dose was: Your next dose is: Take 1 Tab by mouth every four (4) hours as needed. Max Daily Amount: 6 Tabs. 1 Tab * Notice: This list has 2 medication(s) that are the same as other medications prescribed for you. Read the directions carefully, and ask your doctor or other care provider to review them with you. CONTINUE taking these medications Instructions Each Dose to Equal  
 Morning Noon Evening Bedtime Biotin 2,500 mcg Cap Your last dose was: Your next dose is: Take  by mouth. CYMBALTA 60 mg capsule Generic drug:  DULoxetine Your last dose was: Your next dose is: Take 60 mg by mouth daily. 60 mg  
    
   
   
   
  
 VITAMIN C 250 mg tablet Generic drug:  ascorbic acid (vitamin C) Your last dose was: Your next dose is: Take  by mouth. Where to Get Your Medications Information on where to get these meds will be given to you by the nurse or doctor. ! Ask your nurse or doctor about these medications  
  bisacodyl 10 mg suppository HYDROcodone-acetaminophen  mg tablet  
 polyethylene glycol 17 gram packet Discharge Instructions Spinal Cord Injury (Paraplegic): Care Instructions Your Care Instructions A spinal cord injury occurs when a bone of the spine (vertebra) cuts or presses on the spinal cord. This can happen after a fall, car accident, or sports injury. It can also happen if something pierces the spinal cord. A spinal cord injury stops the communication between the brain and the rest of the body. The closer the injury is to the head, the more the body is affected. A serious spinal cord injury in the middle of the back usually causes loss of use of the legs (paralysis). It also usually causes loss of feeling in the legs. Loss of use and feeling in the legs is called paraplegia. First treatments for spinal cord injuries include preventing more damage to the spine and spinal cord. This can be done with braces, casts, straps, or surgery. Medicine may reduce swelling in the spinal cord. You may need surgery to remove bone, straighten the spine, or make the spine more stable. Long-term rehabilitation includes exercises to strengthen muscles that still work. You will also get help learning how to use braces and other tools to do everyday tasks. Researchers are working on new treatments. Some medicines may help the spinal cord heal. Implanted devices may help restore lost function. Realizing you are paralyzed is scary. You will feel many emotions. And you may need help coping. Seek out family, friends, and counselors for support. You also can do things at home to make yourself feel better while you go through treatment. Follow-up care is a key part of your treatment and safety. Be sure to make and go to all appointments, and call your doctor if you are having problems. It's also a good idea to know your test results and keep a list of the medicines you take. How can you care for yourself at home? · Let yourself grieve for the things you can no longer do. Talk about your feelings with a family member, friend, or counselor. This can help you recover as much as possible. · Learn to take care of your bladder. This helps you avoid getting a urinary tract infection. You may need to insert a thin tube into your bladder regularly. This tube is called a catheter. It drains the urine from your bladder. A nurse will show you how to do this. · Work with your doctor or other health professional to make a bowel management program. This will help you have regular bowel movements. · Check your body often for signs of pressure injuries. These can be slow to heal and may become infected. They usually occur on the skin over bony areas such as the knees, hips, heels, or tailbone. And pressure injuries can occur in places where the skin folds over on itself. Change positions often to help prevent these sores. · Be alert for signs of a common problem called autonomic dysreflexia. This can happen when your body can't control blood pressure. It can cause headache, clammy skin, sweating, nausea, and a slow heart rate. · Do the exercises recommended by your therapist. Strong muscles can help you do everyday activities. · Get help with coping if you need it. Discuss your concerns with your doctor, counselor, or other health professional. 
· Join a support group. Talking about your injury with other people who have problems like yours can help you learn to live with a spinal injury. When should you call for help? Call 911 anytime you think you may need emergency care. For example, call if: 
? · You have signs of a common problem called autonomic dysreflexia and the symptoms do not go away after 20 minutes. These include: ¨ A pounding headache. ¨ A flushed face or red patches on your skin above the level of the spinal injury. ¨ Sweating above the level of the spinal injury. ¨ Nausea. ¨ Slow heart rate. ¨ Cold, clammy skin above the level of the spinal injury. ?Call your doctor now or seek immediate medical care if: 
? · You have signs of infection, such as: 
¨ Increased pain, swelling, warmth, or redness. ¨ Red streaks leading from an incision. ¨ Pus draining from an incision. ¨ A fever. ? · You need help with urination and bowel movements.  
 
 
 
 
___________________________________________________________________________________________________________________________________ DISCHARGE SUMMARY from Nurse PATIENT INSTRUCTIONS: 
 
 
The discharge information has been reviewed with the patient. The patient verbalized understanding. Discharge medications reviewed with the patient and appropriate educational materials and side effects teaching were provided. ___________________________________________________________________________________________________________________________________ [employer] Announcement We are excited to announce that we are making your provider's discharge notes available to you in [employer] You will see these notes when they are completed and signed by the physician that discharged you from your recent hospital stay. If you have any questions or concerns about any information you see in [employer], please call the Health Information Department where you were seen or reach out to your Primary Care Provider for more information about your plan of care. Introducing [facility]! Yuly Crowe introduces [employer] patient portal. Now you can access parts of your medical record, email your doctor's office, and request medication refills online. 1. In your internet browser, go to [email or web address] 2. Click on the First Time User? Click Here link in the Sign In box. You will see the New Member Sign Up page. 3. Enter your Fin[employer] Access Code exactly as it appears below. You will not need to use this code after youve completed the sign-up process. If you do not sign up before the expiration date, you must request a new code. · Fineline Access Code: [email or web address] Expires: 3/23/2018 10:17 AM 
 
4. Enter the last four digits of your Social Security Number (xxxx) and Date of Birth (mm/dd/yyyy) as indicated and click Submit. You will be taken to the next sign-up page. 5. Create a Fineline ID. This will be your Fineline login ID and cannot be changed, so think of one that is secure and easy to remember. 6. Create a Fineline password. You can change your password at any time. 7. Enter your Password Reset Question and Answer. This can be used at a later time if you forget your password. 8. Enter your e-mail address. You will receive e-mail notification when new information is available in [address] 9. Click Sign Up. You can now view and download portions of your medical record. 10. Click the Download Summary menu link to download a portable copy of your medical information. If you have questions, please visit the Frequently Asked Questions section of the [employer] website. Remember, [employer] is NOT to be used for urgent needs. For medical emergencies, dial 911. Now available from your iPhone and Android! Providers Seen During Your Hospitalization Provider Specialty Primary office phone Gurwinder Salas MD Physical Medicine and Rehab [phone] Your Primary Care Physician (PCP) Primary Care Physician Office Phone Office Fax NOT ON FILE ** None ** ** None ** You are allergic to the following Allergen Reactions Doxycycline Nausea and Vomiting Pcn (Penicillins) Rash Percocet (Oxycodone-Acetaminophen) Nausea and Vomiting Recent Documentation Smoking Status Never Smoker Emergency Contacts Name Discharge Info Relation Home Work Mobile Negro Serrano  Spouse [3] [phone] Patient Belongings The following personal items are in your possession at time of discharge: 
  Dental Appliances: None  Visual Aid: Glasses      Home Medications: Kept at bedside (vitamin c)   Jewelry: Earrings, Ring, Other (comment) (Fit bit)  Clothing: Pants, Shirt, Undergarments    Other Valuables: Cell Phone Please provide this summary of care documentation to your next provider. Signatures-by signing, you are acknowledging that this After Visit Summary has been reviewed with you and you have received a copy. Patient Signature:  ____________________________________________________________ Date:  ____________________________________________________________  
  
Salvador Lion Provider Signature:  ____________________________________________________________ Date:  ____________________________________________________________

## 2017-12-29 NOTE — PROGRESS NOTES
TRANSFER - OUT REPORT:    Verbal report given to 9th floor RN (name) on Soraya Zee  being transferred to 9th floor (unit) for routine progression of care       Report consisted of patients Situation, Background, Assessment and   Recommendations(SBAR). Information from the following report(s) SBAR, Kardex, [facility] and Recent Results was reviewed with the receiving nurse. Lines:   Peripheral IV 12/23/17 Right Antecubital (Active)   Site Assessment Clean, dry, & intact 12/29/2017  8:40 AM   Phlebitis Assessment 0 12/29/2017  8:40 AM   Infiltration Assessment 0 12/29/2017  8:40 AM   Dressing Status Clean, dry, & intact 12/29/2017  8:40 AM   Dressing Type Tape;Transparent 12/29/2017  8:40 AM   Hub Color/Line Status Flushed;Patent 12/29/2017  8:40 AM   Alcohol Cap Used No 12/27/2017  7:49 AM        Opportunity for questions and clarification was provided. Patient transported with:   Tech, Wilson Catheter and IV.

## 2017-12-29 NOTE — H&P
[address], [address] [place] [address]  Tel: [phone]  Fax: [phone]      HISTORY AND PHYSICAL  IRC       Admit Date: 12/29/2017  Surgeon: Dr. Maria Elena Schmidt  Primary Care Physician: none  Specialty Group: Urology    Chief Complaint : Gait dysfunction secondary to below. Admit Diagnosis: spinal stenosis; Incomplete paraplegia (HCC)  Neurogenic bowel and bladder  Acute Rehab Dx:  Gait impairment/ gait dysfunction  Debility    deconditioning  Mobility and ambulation deficits  Self Care/ADL deficits    Medical Dx:  Past Medical History:   Diagnosis Date    Asthma     Back pain        Date of Evaluation:  December 29, 2017    HPI: Shayne Alba is a 40 y.o. female patient at [address] [place] [address] who was admitted on 12/29/2017  by Jaye Diaz MD with below mentioned medical history ,is being seen for Physical Medicine and Rehabilitation. HPI; per notes \"Jazmine Basilio is a 40 y.o.  female who presents with a long H/O low back problems.  She has a known lumbar disc problem that she has treated conservatively for years. Sebastiananil Paz, yesterday she was getting out of bed and felt a pop in her back.  She had severe LBP and BLE pain all day, L>R.(12/22)  This am, she awoke with urinary incontinence, numbness in the LLE in no specific distribution, and weakness in the LLE to the point that she cannot walk.  She has also developed numbness in the saddle region and most recently, while in MRI, numbness in the RLE.  The pain has involved all muscle groups in BLEs.  She did not feel the Wilson going in just prior to this exam.\"  Work up revealed a L4-5 disc herniation with extrusion with severe narrowing and compression. This was seen on MRI.  Dr Mann Bowers was consulted by the ED and the pt was taken emergently to the OR by Dr Mann Bowers and underwent a left L4-5 partial hemilaminectomy, discectomy , and foraminotomy for decompression of nerves due to acute cauda equina syndrome. This was performed on 12/23, the day of presentation. She had not had a BM since before surgery and she continues to a have a muniz catheter for bladder management. Her pain is fairly well controlled. Physical therapy was initiated and patient was starting to mobilize. However, she shows significant functional deficits due to prolonged immobility and hospitalization. Our service was consulted for rehab needs and we recommended inpatient hospital rehabilitation is reasonable and necessary due to ongoing acute medical issues which have not changed since initial pre-admission evaluation. and rehab needs still likely best managed in IRU setting. The patient was evaluated by [facility] admissions coordinators. I reviewed the preadmission screening and have approved for admission to the [facility]. The patient was cleared for transfer to rehab today. Patient continues to have ongoing  medical issues outlined above requiring physician medical supervision and functional deficits which would benefit from continued intensive therapies. Current Level of Function: (evaluated by acute therapy staff, with bed mobility, transfers, balance personally observed post-admission in the IRF setting minutes prior to submission of document) her mobility has greatly improved since my initial evaluation; she is SBA with transfers and CGA to ambulate 110 ft with a RW. She continues to have bilateral foot drop.  She is mod A with LE dressing, bathing and toileting and set up with grooming and UE dressing    Prior Level of Function/Home Situation:     Home Environment: Private residence  # Steps to Enter: 0  One/Two Story Residence: One story  Living Alone: No  Support Systems: Spouse/Significant Other/Partner, Child(mackenzie)  Patient Expects to be Discharged to[de-identified] home  Current DME Used/Available at Home: None  Tub or Shower Type: Tub/Shower combination  Prior Level of Function/Work/Activity:  Patient lives with spouse and 2 children, ages 6 and 13. She is typically independent with all ADLs and mobility. She drives and works. She is a CMA with [employer]    Past Medical History:   Diagnosis Date    Asthma     Back pain       Past Surgical History:   Procedure Laterality Date    HX CYST REMOVAL      to groin    HX TYMPANOSTOMY       Allergies   Allergen Reactions    Doxycycline Nausea and Vomiting    Pcn [Penicillins] Rash    Percocet [Oxycodone-Acetaminophen] Nausea and Vomiting      No family history on file.    Social History   Substance Use Topics    Smoking status: Never Smoker    Smokeless tobacco: Current User    Alcohol use No      Current Facility-Administered Medications   Medication Dose Route Frequency    acetaminophen (TYLENOL) tablet 650 mg  650 mg Oral Q4H PRN    famotidine (PEPCID) tablet 20 mg  20 mg Oral Q12H    HYDROcodone-acetaminophen (NORCO)  mg tablet 1 Tab  1 Tab Oral Q4H PRN    [START ON 12/30/2017] senna-docusate (PERICOLACE) 8.6-50 mg per tablet 2 Tab  2 Tab Oral DAILY    heparin (porcine) injection 5,000 Units  5,000 Units SubCUTAneous Q12H    alum-mag hydroxide-simeth (MYLANTA) oral suspension 30 mL  30 mL Oral Q4H PRN    bisacodyl (DULCOLAX) suppository 10 mg  10 mg Rectal DAILY PRN    DULoxetine (CYMBALTA) capsule 60 mg  60 mg Oral QHS    lactulose (CHRONULAC) solution 30 g  30 g Oral PRN    magnesium hydroxide (MILK OF MAGNESIA) 400 mg/5 mL oral suspension 30 mL  30 mL Oral DAILY PRN    ondansetron (ZOFRAN ODT) tablet 8 mg  8 mg Oral Q6H PRN    [START ON 12/30/2017] polyethylene glycol (MIRALAX) packet 17 g  17 g Oral DAILY    simethicone (MYLICON) tablet 80 mg  80 mg Oral QID PRN    sodium phosphate (FLEET'S) enema 118 mL  1 Enema Rectal PRN    [START ON 12/30/2017] tamsulosin (FLOMAX) capsule 0.4 mg  0.4 mg Oral DAILY    temazepam (RESTORIL) capsule 30 mg  30 mg Oral QHS PRN    traMADol (ULTRAM) tablet 50 mg  50 mg Oral Q6H PRN    fluconazole (DIFLUCAN) tablet 200 mg  200 mg Oral DAILY       Review of Systems:  Neg x for what is listed in HPI      Objective:     Visit Vitals    /77 (BP 1 Location: Right arm, BP Patient Position: Sitting)    Pulse (!) 102    Temp 98.5 °F (36.9 °C)    Resp 18    SpO2 97%        Physical Exam: PER EXAM 12/28  Psych: Patient was oriented to person, place, and time. examination. General:   Alert, appears stated age, No acute distress. HEENT:  Normocephalic, EOMI, facial symmetry  Intact. Oral mucosa pink and moist. No nasal discharge. Lungs:  CTA Bilaterally,Respiration even and unlabored . Heart:  Regular rate and rhythm, S1, S2, No obvious murmur, click, rub or gallop. Genitourinary: defered   Abdomen:  Soft, non-tender to palpation in all four quadrants. Bowel sounds present. No obvious masses palpated. Neuromuscular:  PERRL, EOMI  Upper extremities 5/5, sym and sens intact  hip flexion 3+, KE 3+, DF 2-, PF 4  Sensory - intact  Plantars - down going  DTR muted in LEs     Skin:  Intact, dry, good skin turgor, age related changes present   Edema: none      Incision:  healing well, clean, dry, and intact        Lab Review:    Recent Results (from the past 72 hour(s))   PLEASE READ & DOCUMENT PPD TEST IN 72 HRS    Collection Time: 12/27/17  7:45 PM   Result Value Ref Range    PPD  Negative    mm Induration  mm         Condition on Admission: Good      Assessment:L4-5 disc herniation with extrusion with severe narrowing and compression. s/p left L4-5 partial hemilaminectomy, discectomy , and foraminotomy for decompression of nerves due to acute cauda equina syndrome. This was performed on 12/23,     The Post Assessment Physician Evaluation (AMARILIS) found the current functional status to be comparable with the Pre-admission Screening. The Patient is a good candidate for acute inpatient rehabilitation. Nothing since the Pre-admission screen has changed that determination.      Rehabilitation Plan  The patient has shown the ability to tolerate and benefit from 3 hours of therapy daily and is being admitted to a comprehensive acute inpatient rehabilitation program consisting of at least 3 hours of combined physical and occupational therapies. Begin intensive Physical Therapy for a minimum of 1.5 hours a day, at least 5 out of 7 days per week to address bed mobility, transfers, ambulation, strengthening, balance, and endurance. Begin intensive Occupational Therapy for a minimum of 1.5 hours a day, at least 5 out of 7 days per week to address ADL ( bathing, LE dressing, toileting) and adaptive equipment as needed. The patient will also require 24-hour skilled rehabilitation nursing for bowel and bladder management, skin care for decubitus ulcer prevention , pain management and ongoing medication administration     The patient may benefit from a psychology consult for depression, adjustment disorder. Continue daily physician medical management:  Pneumonia prophylaxis- Incentive spirometer every hour while awake    DVT risk / DVT Prophylaxis- Will require daily physician exam to assess for signs and symptoms as patient is at increased risk for of thromboembolism. Mobilization as tolerated. Intermittent pneumatic compression devices when in bed Thigh-high or knee-high thromboembolic deterrent hose when out of bed. Heparin 5000 bid    Pain Control: stable, mild-to-moderate joint symptoms intermittently, reasonably well controlled by PRN meds. Will require regular pain assessment and comprenhensive pain management, Cont Norco; add Ultram; no neuropathic pain reported    Wound Care: Monitor wound status daily per staff and physician. At risk for failure. Will require 24/7 rehab nursing. Keep wound clean and dry    Hypotension; teds prn; watch orthostatics    Hx of depression; cont Cymbalta; has been coping very well    Bilateral foot drop; will likely need maria esther AFOs but hold off on orthotist consult.  Starting to be able to actively dorsiflex, thus may not need solid ankle AFO; soha for need for DF assist, hinged. .. Urinary retention/ neurogenic bladder - DC LACY IN A.M 12/20, BEGIN CIC Q 6HRS, IF PT VOIDS, CIC AS PVR IF SCAN >300 ML. STARTED FLOMAX, CONSIDER URECHOLINE IF PT ABLE TO VOID SPONTANEOUSLY BUT WITH HIGH RESIDUALS  -Check UA  -has vaginal yeast infxn; Diflucan added ; treat for 3d oral diflucan    bowel program - cont Miralax, pericolace, daily suppos/ prn lactulose, fleets    GERD - resume PPI. At times may need additional antacids, Maalox prn. The patient's prognosis for significant practical improvement within a reasonable period of time appears good and the estimated length of stay is 7-10 days and she is expected to return home with spouse and continued rehabilitation with home health therapy. Given the patient's complex neurologic/medical condition and the risk of further medical complications including: DVT, PE, skin breakdown, pneumonia due to decreased mobility,   infection at surgical site , increased risk of thromboembolism secondary to decreased mobility and increased energy expenditure in a patient could potentially impact the IRF program with decreased cardiovascular efficiency, postural hypotension and cardiac arrhythmia. For these ongoing medical issues, rehabilitation services could not be safely provided at a lower level of care such as a skilled nursing facility or nursing home.         Signed By: Josefa Negro MD     December 29, 2017

## 2017-12-29 NOTE — PROGRESS NOTES
Pt stated that Dr. Arleth Savage gave permission to be up to wheelchair on unit. Primary nurse counseled pt on use of wheelchair and giving notification if leaving the unit. Pt verbalized understanding. Spouse pushing pt in wheelchair on the unit. Will continue to monitor.

## 2017-12-29 NOTE — PROGRESS NOTES
Problem: Falls - Risk of  Goal: *Absence of Falls  Document Judith Fall Risk and appropriate interventions in the flowsheet.    Outcome: Progressing Towards Goal  Fall Risk Interventions:  Mobility Interventions: Patient to call before getting OOB         Medication Interventions: Evaluate medications/consider consulting pharmacy, Patient to call before getting OOB    Elimination Interventions: Call light in reach, Patient to call for help with toileting needs

## 2017-12-29 NOTE — PROGRESS NOTES
Problem: Falls - Risk of  Goal: *Absence of Falls  Document Judith Fall Risk and appropriate interventions in the flowsheet.    Outcome: Progressing Towards Goal  Fall Risk Interventions:  Mobility Interventions: Bed/chair exit alarm, Patient to call before getting OOB         Medication Interventions: Bed/chair exit alarm, Patient to call before getting OOB, Teach patient to arise slowly    Elimination Interventions: Bed/chair exit alarm, Call light in reach, Patient to call for help with toileting needs, Toilet paper/wipes in reach, Toileting schedule/hourly rounds

## 2017-12-29 NOTE — PROGRESS NOTES
Pt admitted from 7th floor to room 919. Pt alert and oriented with family at bedside. Call light given to pt and instructed to call when getting out of bed or to the bathroom. Dual skin assessment done with Navarro Davila RN. Small incision noted on lower midline back covered with band aid. Admission assessment completed. Pt left resting in recliner with call light and  at bedside.

## 2017-12-30 LAB
ALBUMIN SERPL-MCNC: 3.1 G/DL (ref 3.5–5)
ALBUMIN/GLOB SERPL: 0.8 {RATIO} (ref 1.2–3.5)
ALP SERPL-CCNC: 99 U/L (ref 50–136)
ALT SERPL-CCNC: 117 U/L (ref 12–65)
ANION GAP SERPL CALC-SCNC: 6 MMOL/L (ref 7–16)
AST SERPL-CCNC: 35 U/L (ref 15–37)
BILIRUB SERPL-MCNC: 0.3 MG/DL (ref 0.2–1.1)
BUN SERPL-MCNC: 20 MG/DL (ref 6–23)
CALCIUM SERPL-MCNC: 8.8 MG/DL (ref 8.3–10.4)
CHLORIDE SERPL-SCNC: 103 MMOL/L (ref 98–107)
CO2 SERPL-SCNC: 30 MMOL/L (ref 21–32)
CREAT SERPL-MCNC: 0.62 MG/DL (ref 0.6–1)
ERYTHROCYTE [DISTWIDTH] IN BLOOD BY AUTOMATED COUNT: 12.5 % (ref 11.9–14.6)
GLOBULIN SER CALC-MCNC: 4 G/DL (ref 2.3–3.5)
GLUCOSE SERPL-MCNC: 94 MG/DL (ref 65–100)
HCT VFR BLD AUTO: 36.8 % (ref 35.8–46.3)
HGB BLD-MCNC: 11.9 G/DL (ref 11.7–15.4)
MCH RBC QN AUTO: 32 PG (ref 26.1–32.9)
MCHC RBC AUTO-ENTMCNC: 32.3 G/DL (ref 31.4–35)
MCV RBC AUTO: 98.9 FL (ref 79.6–97.8)
PLATELET # BLD AUTO: 346 K/UL (ref 150–450)
PMV BLD AUTO: 9.7 FL (ref 10.8–14.1)
POTASSIUM SERPL-SCNC: 4 MMOL/L (ref 3.5–5.1)
PROT SERPL-MCNC: 7.1 G/DL (ref 6.3–8.2)
RBC # BLD AUTO: 3.72 M/UL (ref 4.05–5.25)
SODIUM SERPL-SCNC: 139 MMOL/L (ref 136–145)
WBC # BLD AUTO: 10.6 K/UL (ref 4.3–11.1)

## 2017-12-30 PROCEDURE — 97116 GAIT TRAINING THERAPY: CPT

## 2017-12-30 PROCEDURE — 97110 THERAPEUTIC EXERCISES: CPT

## 2017-12-30 PROCEDURE — 97162 PT EVAL MOD COMPLEX 30 MIN: CPT

## 2017-12-30 PROCEDURE — 51798 US URINE CAPACITY MEASURE: CPT

## 2017-12-30 PROCEDURE — 97166 OT EVAL MOD COMPLEX 45 MIN: CPT

## 2017-12-30 PROCEDURE — 74011250636 HC RX REV CODE- 250/636: Performed by: PHYSICAL MEDICINE & REHABILITATION

## 2017-12-30 PROCEDURE — 65310000000 HC RM PRIVATE REHAB

## 2017-12-30 PROCEDURE — 74011250637 HC RX REV CODE- 250/637: Performed by: PHYSICAL MEDICINE & REHABILITATION

## 2017-12-30 PROCEDURE — 80053 COMPREHEN METABOLIC PANEL: CPT | Performed by: PHYSICAL MEDICINE & REHABILITATION

## 2017-12-30 PROCEDURE — 85027 COMPLETE CBC AUTOMATED: CPT | Performed by: PHYSICAL MEDICINE & REHABILITATION

## 2017-12-30 PROCEDURE — 36415 COLL VENOUS BLD VENIPUNCTURE: CPT | Performed by: PHYSICAL MEDICINE & REHABILITATION

## 2017-12-30 PROCEDURE — 97535 SELF CARE MNGMENT TRAINING: CPT

## 2017-12-30 PROCEDURE — 97530 THERAPEUTIC ACTIVITIES: CPT

## 2017-12-30 RX ADMIN — FLUCONAZOLE 200 MG: 100 TABLET ORAL at 09:03

## 2017-12-30 RX ADMIN — TAMSULOSIN HYDROCHLORIDE 0.4 MG: 0.4 CAPSULE ORAL at 09:04

## 2017-12-30 RX ADMIN — HEPARIN SODIUM 5000 UNITS: 5000 INJECTION, SOLUTION INTRAVENOUS; SUBCUTANEOUS at 09:04

## 2017-12-30 RX ADMIN — DULOXETINE HYDROCHLORIDE 60 MG: 30 CAPSULE, DELAYED RELEASE ORAL at 21:30

## 2017-12-30 RX ADMIN — HYDROCODONE BITARTRATE AND ACETAMINOPHEN 1 TABLET: 10; 325 TABLET ORAL at 09:03

## 2017-12-30 RX ADMIN — STANDARDIZED SENNA CONCENTRATE AND DOCUSATE SODIUM 2 TABLET: 8.6; 5 TABLET, FILM COATED ORAL at 09:03

## 2017-12-30 RX ADMIN — POLYETHYLENE GLYCOL (3350) 17 G: 17 POWDER, FOR SOLUTION ORAL at 09:04

## 2017-12-30 RX ADMIN — HEPARIN SODIUM 5000 UNITS: 5000 INJECTION, SOLUTION INTRAVENOUS; SUBCUTANEOUS at 21:30

## 2017-12-30 RX ADMIN — FAMOTIDINE 20 MG: 20 TABLET, FILM COATED ORAL at 21:30

## 2017-12-30 RX ADMIN — FAMOTIDINE 20 MG: 20 TABLET, FILM COATED ORAL at 09:04

## 2017-12-30 RX ADMIN — HYDROCODONE BITARTRATE AND ACETAMINOPHEN 1 TABLET: 10; 325 TABLET ORAL at 17:17

## 2017-12-30 NOTE — PROGRESS NOTES
[facility] OCCUPATIONAL THERAPY INITIAL EVALUATION    Time In: 2340  Time Out: 5967    Precautions: Spinal    Vitals: Patient Vitals for the past 8 hrs:   Temp Pulse Resp BP SpO2   12/30/17 0639 97.5 °F (36.4 °C) (!) 105 16 99/65 96 %         Pain: Patient rated pain 5 out of 10 located at lower back. Patient had nurse notified with patient reporting an increase in pain after intervention, 6/10 after bathing    History of Presenting Illness (per previous reports): José Luis Craven is a 40 y.o. female patient at [facility] who was admitted on 12/29/2017  by Melony Oliva MD with below mentioned medical history ,is being seen for Physical Medicine and Rehabilitation.       HPI; per notes \"Jazmine Basilio is a 40 y.o.  female who presents with a long H/O low back problems. Cindi Velazquez has a known lumbar disc problem that she has treated conservatively for years. Tory Varner, yesterday she was getting out of bed and felt a pop in her back.  She had severe LBP and BLE pain all day, L>R.(12/22)  This am, she awoke with urinary incontinence, numbness in the LLE in no specific distribution, and weakness in the LLE to the point that she cannot walk.  She has also developed numbness in the saddle region and most recently, while in MRI, numbness in the RLE.  The pain has involved all muscle groups in BLEs.  She did not feel the Muniz going in just prior to this exam.\"  Work up revealed a L4-5 disc herniation with extrusion with severe narrowing and compression. This was seen on MRI. Dr Aries Wilson was consulted by the ED and the pt was taken emergently to the OR by Dr Aries Wilson and underwent a left L4-5 partial hemilaminectomy, discectomy , and foraminotomy for decompression of nerves due to acute cauda equina syndrome. This was performed on 12/23, the day of presentation. She had not had a BM since before surgery and she continues to a have a muniz catheter for bladder management. Her pain is fairly well controlled.    Physical therapy was initiated and patient was starting to mobilize. However, she shows significant functional deficits due to prolonged immobility and hospitalization. Our service was consulted for rehab needs and we recommended inpatient hospital rehabilitation is reasonable and necessary due to ongoing acute medical issues which have not changed since initial pre-admission evaluation. and rehab needs still likely best managed in IRU setting. The patient was evaluated by [facility] admissions coordinators. I reviewed the preadmission screening and have approved for admission to the [facility]. The patient was cleared for transfer to rehab today.  Patient continues to have ongoing  medical issues outlined above requiring physician medical supervision and functional deficits which would benefit from continued intensive therapies.          Past Medical History/ Co-morbidities:   Past Medical History:   Diagnosis Date    Asthma     Back pain        Patient's Goal: \"I want to be normal\"    Previous Level of Function: independent with all ADLs and IADLs    Home Situation    Environment Comments   House Situation Private residence    Lives Alone No    Support Systems Spouse/Significant Other/Partner, Child(mackenzie)    Shower Situation Tub/Shower combination (no GB, curtain)    Current DME Walker    Lift Chair  None    Stairs to Enter 0       Rails  None       W/C Ramp No    Interior Steps  No      Upper Extremity Function   LUNIGEL RUNIGEL   FMC  Intact  Intact   GMC  Intact  Intact   Light Touch       Sharp/Dull Discrimination       Hot/Cold       Proprioception       Stereognosis       9 Hole Peg Test       General Comments No sensory deficits noted No sensory deficits noted      JENNIFER ROJO   General Evalutaion  WNL WNL   AROM       Shoulder Flexion       Shoulder Extension        Shoulder Abduction       Shoulder Adduction       Elbow Flexion       Elbow Extension        Wrist Flexion/Extension              General Comments Good ROM, WNL Good ROM, WNL   0/5 No palpable muscle contraction  1/5 Palpable muscle contraction, no joint movement  2-/5 Less than full range of motion in gravity eliminated position  2/5 Able to complete full range of motion in gravity eliminated position  2+/5 Able to initiate movement against gravity  3-/5 More than half but not full range of motion against gravity  3/5 Able to complete full range of motion against gravity  3+/5 Completes full range of motion against gravity with minimal resistance  4-/5 Completes full range of motion against gravity with minimal-moderate resistance  4/5 Completes full range of motion against gravity with moderate resistance  4+/5 Completes full range of motion against gravity with moderate-maximum resistance  5/5 Completes full range of motion against gravity with maximum resistance      Functional Mobility   Performance Comments   Sitting: Static  Good    Sitting: Dynamic  Good    Standing: Static  Good Use of FWW   Standing: Dynamic  Fair, CGA Decreased sensation and strength in bilateral LE   Supine to Sit  SBA vcs for spinal precautions   Sit to Stand Assistance  Good, SBA Use of FWW   Transfer Assist Score  CGA-SBA Use of FWW   Transfer Type   Use of FWW     Cognition   Performance Comments   Orientation Level Oriented X4    Comprehension Level 7 Mode: Auditory  Hearing Aid:None  Glasses:    Expression (Native Language) 7 Mode: Verbal      Social Interaction/Pragmatics 7 friendly, active in conversation   Problem Solving 7     Memory 7     Comments       Activities of Daily Living    Score Comments   Self-Feeding 7     Grooming 5 (set up assistance, seated for brushing teeth and hair) Tasks completed by patient: Brushed hair, Brushed teeth, Washed hands, Washed face      Bathing 5 Body Parts Bathe: Abdomen, Arm, left, Arm, right, Buttocks, Chest, Lower leg and foot, left, Lower leg and foot, right, Sharyn area, Thigh, left, Thigh, right  SBA while standing for bathing Tub/Shower Transfer Wallace 4 Type of Shower: Shower  Adaptive  Equipment:Tub transfer bench and Grab bars   Upper Body  Dressing/Undressing 5 Items Applied:Bra (3 steps), Pullover (4 steps)  7/7 steps completed by pt, set up assistance and extra timing   Lower Body Dressing/Undressing 5 Items Applied:Shoe, right (1 step), Shoe, left (1 step), Sock, left (1 step), Sock, right (1 step), Underpants (3 steps), Elastic waist pants (3 steps)  101/0 completed by patient, cuing for adhearing to spinal precautions,seated on TTB   Toileting 6 extra timing, use of GB for support   Toilet Transfer 4 CGA, use of GB     Vision/Perception: No deficits noted. Instrumental Activities of Daily Living   Performance Comments   Meal Preperation Minimum assistance, Moderate assistance    Homemaking Maximum assistance    Medication Management Independent    Financial Management Independent        Session: Mrs. Lewis [place] was lying supine in bed upon arrival. Therapist completed ROM/Strength/ADL assessment. Decreased strength and ROM in bilateral LE, however, more prominent in left LE. Poor ability to dorsiflex at ankles and shuffling/decreased elevation of feet during gait. Patient completed morning ADLs with CGA/SBA while all functional transfers. Limited assistance required, however, required cuing and extra timing due to spinal precautions and pain. Patient fatigued throughout session and after completion of shower. Concluded session with patient seated EOB, tray table and call-light within reach, and spouse present. Interdisciplinary Communication: Communicated status of patient and activities completed in evaluation    Patient/Family Education: Patient and Family was/were educated On the role of OT, On POC and On IRC expectations.      Problem List: GMC, Activity Tolerance, Decreased ROM Bilateral LE and Strength    Functional Limitations: ADL, IADL, Functional Transfers and Functional Mobility    Goals: Please see Care Plan    OT order received and chart reviewed. OT orders have been acknowledged. Patient will benefit from skilled OT services to address ADL, functional transfers and activity tolerance to maximize functional performance with daily self-care tasks and functional mobility. Treatment is likely to include ADL, Strength, Activity tolerance, Neuro-muscular re-education, DME and AE training to increase independence with self-care. Patient will be seen for 1.5-2 hours of skilled OT services 5-6 days a week.      Milagro Landers, CALLID, OTR/L  12/30/17

## 2017-12-30 NOTE — PROGRESS NOTES
Pt up sitting in wheelchair, c/o pain 5/10 tp back and BLE. Pt states pain to backing is aching and pt sharp shooting pain to BLE. Pt given Narco 10/325 m,g po. Family at bedside. Pt encourage to call for any needs.

## 2017-12-30 NOTE — PROGRESS NOTES
Pt monitored through the night. Rested with eyes closed most of night. Wilson catheter discontinued. Pt instructed on saving urine for measurement--hat placed in toilet. Pt instructed to let nurse know if she feels unable to void or feels like she is not emptying bladder. Pt verbalized understanding.

## 2017-12-30 NOTE — PROGRESS NOTES
Pt assisted to bathroom where she had a large formed BM. Pt able to ambulate with walker with mild unsteadiness. Wilson catheter clamped in order to obtain urine sample. Pt reports pain has increased a little since trip to bathroom-recently medicated. Pt reports no sensation in buttocks, however, was able to tell she had to have BM. Pt reports that she has some decreased sensation in legs when tested--she feels a little less in left leg than in right. Pt instructed to call for assistance.

## 2017-12-30 NOTE — PROGRESS NOTES
Problem: Falls - Risk of  Goal: *Absence of Falls  Document Judith Fall Risk and appropriate interventions in the flowsheet.    Outcome: Progressing Towards Goal  Fall Risk Interventions:  Mobility Interventions: Assess mobility with egress test, Communicate number of staff needed for ambulation/transfer, OT consult for ADLs, Patient to call before getting OOB, PT Consult for mobility concerns, PT Consult for assist device competence, Strengthening exercises (ROM-active/passive), Utilize walker, cane, or other assitive device         Medication Interventions: Assess postural VS orthostatic hypotension, Evaluate medications/consider consulting pharmacy, Patient to call before getting OOB, Teach patient to arise slowly    Elimination Interventions: Call light in reach, Elevated toilet seat, Patient to call for help with toileting needs, Toilet paper/wipes in reach, Toileting schedule/hourly rounds

## 2017-12-30 NOTE — PROGRESS NOTES
Assessment  Completed. Pt sitting on side of bed. C/o pain 5 /10 to back. Pt has small band aide to back incision, no drainage noted. Dressing C/D/I. Pt is A/O x 4, denies SOB, pt is afebrile and walks with a walker to bathroom with 1 person assist.  Pt encourage to call for any needs.

## 2017-12-30 NOTE — PROGRESS NOTES
End Of Shift Functional Summary, Nursing      TOILETING:  Does patient need assist with clothing management and/or pericare? No    TOILET TRANSFER:  Pt requires minimal assistance. Pt uses walker. BLADDER:  Pt does have a muniz catheter that staff manages. Pt does not take medication. (An accident is when the episode is not contained in a brief AND/OR the clothing/linen requires changing/cleaning up.)    BOWEL:  Pt does take medication. Pt is continent of bowel and uses toilet. Pt requires staff to none of the above    Pt has had 0 bowel accidents during this shift requiring no assist from staff to clean up. (An accident is when the episode is not contained in a brief AND/OR the clothing/linen requires changing/cleaning up.)    BED/CHAIR TRANSFER  Pt requires minimal assistance. Patient requires the assistance of 1 staff member(s). Pt uses walker          Documentation reviewed and plan of care discussed/reviewed with   patient, oncoming nurse and family/spouse during the shift.

## 2017-12-30 NOTE — PROGRESS NOTES
MD Caitlyn,   Medical Director  [address] [facility], [address] [facility]  Tel: [phone]       [facility] PROGRESS NOTE    Jazmnie Patrick Charter Date: 12/29/2017    Chief Complaint : Gait dysfunction secondary to below. Admit Diagnosis: spinal stenosis; Incomplete paraplegia (HCC)  Neurogenic bowel and bladder  Acute Rehab Dx:  Gait impairment/ gait dysfunction  Debility    deconditioning  Mobility and ambulation deficits  Self Care/ADL deficits     Subjective     Patient seen and examined. Vss. Afebrile. No acute pain complaints. + BM. muniz discontinued in am. Will monitor PVRs. PT, OT well tolerated. Gait attempted with bilateral ace wraps for DF assist, at min assist level.      Objective:     Current Facility-Administered Medications   Medication Dose Route Frequency    acetaminophen (TYLENOL) tablet 650 mg  650 mg Oral Q4H PRN    famotidine (PEPCID) tablet 20 mg  20 mg Oral Q12H    HYDROcodone-acetaminophen (NORCO)  mg tablet 1 Tab  1 Tab Oral Q4H PRN    senna-docusate (PERICOLACE) 8.6-50 mg per tablet 2 Tab  2 Tab Oral DAILY    heparin (porcine) injection 5,000 Units  5,000 Units SubCUTAneous Q12H    alum-mag hydroxide-simeth (MYLANTA) oral suspension 30 mL  30 mL Oral Q4H PRN    bisacodyl (DULCOLAX) suppository 10 mg  10 mg Rectal DAILY PRN    DULoxetine (CYMBALTA) capsule 60 mg  60 mg Oral QHS    lactulose (CHRONULAC) solution 30 g  30 g Oral PRN    magnesium hydroxide (MILK OF MAGNESIA) 400 mg/5 mL oral suspension 30 mL  30 mL Oral DAILY PRN    ondansetron (ZOFRAN ODT) tablet 8 mg  8 mg Oral Q6H PRN    polyethylene glycol (MIRALAX) packet 17 g  17 g Oral DAILY    simethicone (MYLICON) tablet 80 mg  80 mg Oral QID PRN    sodium phosphate (FLEET'S) enema 118 mL  1 Enema Rectal PRN    tamsulosin (FLOMAX) capsule 0.4 mg  0.4 mg Oral DAILY    temazepam (RESTORIL) capsule 30 mg  30 mg Oral QHS PRN    traMADol (ULTRAM) tablet 50 mg  50 mg Oral Q6H PRN    fluconazole (DIFLUCAN) tablet 200 mg  200 mg Oral DAILY     Review of Systems:Denies chest pain, shortness of breath, cough, headache, visual problems, abdominal pain, dysurea, calf pain. Pertinent positives are as noted in the medical records and unremarkable otherwise. Visit Vitals    BP 99/65 (BP 1 Location: Right arm, BP Patient Position: At rest)    Pulse (!) 105    Temp 97.5 °F (36.4 °C)    Resp 16    SpO2 96%        Physical Exam:   General: Alert and age appropriately oriented. No acute cardio respiratory distress. HEENT: Normocephalic,no scleral icterus  Oral mucosa moist without cyanosis   Lungs: Clear to auscultation  bilaterally. Respiration even and unlabored   Heart: Regular rate and rhythm, S1, S2   No  murmurs, clicks, rub or gallops   Abdomen: Soft, non-tender, nondistended. Bowel sounds present. No organomegaly. Genitourinary: Benign . Neuromuscular:      Grossly no focal motor deficits noted. Moves ankles. Ankle dorsiflexion 5/5  Ankle plantarflexion 5/5  No sensory deficits distally. Exam limited by pain. Skin/extremity: No rashes, no erythema. No calf tenderness BLE  Wound covered. Functional Assessment:                                [facility] Fall Risk Assessment:  [facility] Fall Risk  Mobility: Ambulates or transfers with assist devices or assistance/unsteady gait (12/29/17 1934)  Mobility Interventions: Assess mobility with egress test;Communicate number of staff needed for ambulation/transfer;OT consult for ADLs; Patient to call before getting OOB;PT Consult for mobility concerns;PT Consult for assist device competence;Strengthening exercises (ROM-active/passive); Utilize walker, cane, or other assitive device (12/29/17 1934)  Mentation: Alert, oriented x 3 (12/29/17 1934)  Medication: Patient receiving anticonvulsants, sedatives(tranquilizers), psychotropics or hypnotics, hypoglycemics, narcotics, sleep aids, antihypertensives, laxatives, or diuretics (12/29/17 1934)  Medication Interventions: Assess postural VS orthostatic hypotension; Evaluate medications/consider consulting pharmacy; Patient to call before getting OOB; Teach patient to arise slowly (12/29/17 1934)  Elimination: Needs assistance with toileting (12/29/17 1934)  Elimination Interventions: Call light in reach;Elevated toilet seat;Patient to call for help with toileting needs; Toilet paper/wipes in reach; Toileting schedule/hourly rounds (12/29/17 1934)  Prior Fall History: No (12/29/17 1934)  Total Score: 3 (12/29/17 1934)  Standard Fall Precautions: Yes (12/29/17 1934)  High Fall Risk: Yes (12/29/17 1934)     Speech Assessment:         Ambulation:        Labs/Studies:  Recent Results (from the past 67 hour(s))   PLEASE READ & DOCUMENT PPD TEST IN 72 HRS    Collection Time: 12/27/17  7:45 PM   Result Value Ref Range    PPD  Negative    mm Induration  mm   CULTURE, URINE    Collection Time: 12/29/17  9:59 PM   Result Value Ref Range    Special Requests: NO SPECIAL REQUESTS      Culture result:        NO GROWTH AFTER SHORT PERIOD OF INCUBATION. FURTHER RESULTS TO FOLLOW AFTER OVERNIGHT INCUBATION.    URINALYSIS W/ RFLX MICROSCOPIC    Collection Time: 12/29/17  9:59 PM   Result Value Ref Range    Color YELLOW      Appearance CLOUDY      Specific gravity 1.014 1.001 - 1.023      pH (UA) 7.0 5.0 - 9.0      Protein NEGATIVE  NEG mg/dL    Glucose NEGATIVE  mg/dL    Ketone NEGATIVE  NEG mg/dL    Bilirubin NEGATIVE  NEG      Blood NEGATIVE  NEG      Urobilinogen 0.2 0.2 - 1.0 EU/dL    Nitrites NEGATIVE  NEG      Leukocyte Esterase NEGATIVE  NEG     CBC W/O DIFF    Collection Time: 12/30/17  7:17 AM   Result Value Ref Range    WBC 10.6 4.3 - 11.1 K/uL    RBC 3.72 (L) 4.05 - 5.25 M/uL    HGB 11.9 11.7 - 15.4 g/dL    HCT 36.8 35.8 - 46.3 %    MCV 98.9 (H) 79.6 - 97.8 FL    MCH 32.0 26.1 - 32.9 PG    MCHC 32.3 31.4 - 35.0 g/dL    RDW 12.5 11.9 - 14.6 %    PLATELET 279 979 - 150 K/uL    MPV 9.7 (L) 10.8 - 86.4 FL   METABOLIC PANEL, COMPREHENSIVE    Collection Time: 12/30/17  7:17 AM   Result Value Ref Range    Sodium 139 136 - 145 mmol/L    Potassium 4.0 3.5 - 5.1 mmol/L    Chloride 103 98 - 107 mmol/L    CO2 30 21 - 32 mmol/L    Anion gap 6 (L) 7 - 16 mmol/L    Glucose 94 65 - 100 mg/dL    BUN 20 6 - 23 MG/DL    Creatinine 0.62 0.6 - 1.0 MG/DL    GFR est AA >60 >60 ml/min/1.73m2    GFR est non-AA >60 >60 ml/min/1.73m2    Calcium 8.8 8.3 - 10.4 MG/DL    Bilirubin, total 0.3 0.2 - 1.1 MG/DL    ALT (SGPT) 117 (H) 12 - 65 U/L    AST (SGOT) 35 15 - 37 U/L    Alk. phosphatase 99 50 - 136 U/L    Protein, total 7.1 6.3 - 8.2 g/dL    Albumin 3.1 (L) 3.5 - 5.0 g/dL    Globulin 4.0 (H) 2.3 - 3.5 g/dL    A-G Ratio 0.8 (L) 1.2 - 3.5         Assessment:     Problem List as of 12/30/2017  Date Reviewed: 12/23/2017          Codes Class Noted - Resolved    Incomplete paraplegia [facility]) ICD-10-CM: G82.22  ICD-9-CM: 344.1  12/29/2017 - Present        Cauda equina syndrome with neurogenic bladder [facility]) ICD-10-CM: G83.4  ICD-9-CM: 344.61  12/23/2017 - Present              Assessment/ plan:         Assessment:L4-5 disc herniation with extrusion with severe narrowing and compression. s/p left L4-5 partial hemilaminectomy, discectomy, and foraminotomy for decompression of nerves due to acute cauda equina syndrome. This was performed on 12/23,      The Post Assessment Physician Evaluation (AMARILIS) found the current functional status to be comparable with the Pre-admission Screening. The Patient is a good candidate for acute inpatient rehabilitation.  Nothing since the Pre-admission screen has changed that determination.      Rehabilitation Plan  The patient has shown the ability to tolerate and benefit from 3 hours of therapy daily and is being admitted to a comprehensive acute inpatient rehabilitation program consisting of at least 3 hours of combined physical and occupational therapies. Begin intensive Physical Therapy for a minimum of 1.5 hours a day, at least 5 out of 7 days per week to address bed mobility, transfers, ambulation, strengthening, balance, and endurance. Focus on LE strength funciton. Begin intensive Occupational Therapy for a minimum of 1.5 hours a day, at least 5 out of 7 days per week to address ADL ( bathing, LE dressing, toileting) and adaptive equipment as needed.     The patient will also require 24-hour skilled rehabilitation nursing for bowel and bladder management, skin care for decubitus ulcer prevention , pain management and ongoing medication administration      The patient may benefit from a psychology consult for depression, adjustment disorder.         Continue daily physician medical management:  Pneumonia prophylaxis- Incentive spirometer every hour while awake     DVT risk / DVT Prophylaxis- Will require daily physician exam to assess for signs and symptoms as patient is at increased risk for of thromboembolism. Mobilization as tolerated. Intermittent pneumatic compression devices when in bed Thigh-high or knee-high thromboembolic deterrent hose when out of bed.    started on Heparin 5000 bid     Pain Control: stable, mild-to-moderate joint symptoms intermittently, reasonably well controlled by PRN meds. Will require regular pain assessment and comprenhensive pain management,   - no new pain source. Needing few prn norco /day for surgical back pain.       Wound Care: Monitor wound status daily per staff and physician. At risk for failure. Will require 24/7 rehab nursing. Keep wound clean and dry     Hypotension; teds prn;   - watch orthostatics. Asymptomatic at PT.       Hx of depression; cont Cymbalta; has been coping very well     Bilateral foot drop; will likely need maria esther AFOs but hold off on orthotist consult. Starting to be able to actively dorsiflex, thus may not need solid ankle AFO; soha for need for DF assist, hinged. ..     Urinary retention/ neurogenic bladder -   muniz removed in am.voiding trials. Will monitor PVRs closely. - UA -neg.   -has vaginal yeast infxn; Diflucan added ; treat for 3d oral diflucan    bowel program - cont Miralax, pericolace, daily suppos/ prn lactulose, fleets. Had mod BM yesterday and today. Denies soiling.      GERD - resume PPI. At times may need additional antacids, Maalox prn. Time spent was 25 minutes with over 1/2 in direct patient care/examination, consultation and coordination of care.      Signed By: Anne Briec MD     December 30, 2017

## 2017-12-30 NOTE — PROGRESS NOTES
PHYSICAL THERAPY EXAMINATION  TIME IN  [address] [address]  Patient Name: Caesar Fernandez  Patient Age: 40 y.o. Past Medical History:   Past Medical History:   Diagnosis Date    Asthma     Back pain        Medical Diagnosis:  spinal stenosis  Incomplete paraplegia (HCC) <principal problem not specified>    Precautions at Admission: Spinal;Other (comment) (fall precautions)    Therapy Diagnosis:   Difficulty with bed mobility  [x]     Difficulty with functional transfers  [x]     Difficulty with ambulation  [x]     Difficulty with stair negotiations  [x]       Problem List:    Decreased strength B LE  [x]     Decreased strength trunk/core  [x]     Decreased AROM   [x]     Decreased PROM  []    Decreased endurance  [x]     Decreased balance sitting  []     Decreased balance standing  [x]     Pain   [x]     Slow ambulation velocity  [x]    Decreased coordination  [x]    Decreased safety awareness  []      Functional Limitations:   Decreased independence with bed mobility  [x]     Decreased independence with functional transfers  [x]     Decreased independence with ambulation  [x]     Decreased independence with stair negotiation  [x]       Previous Functional Level: independent with all ADLs and IADLs patient reporting ambulating independently without a device inside and outside home.     Home Environment: Home Environment: Private residence  # Steps to Enter: 0  Rails to Enter: No  Wheelchair Ramp: No  One/Two Story Residence: One story  Living Alone: No  Support Systems: Spouse/Significant Other/Partner  Patient Expects to be Discharged to[de-identified] Private residence  Current DME Used/Available at Home: None  Tub or Shower Type: Tub/Shower combination (no GB, curtain)         Outcome Measures: Vital Signs:  Patient Vitals for the past 12 hrs:   Temp Pulse Resp BP SpO2   12/30/17 0639 97.5 °F (36.4 °C) (!) 105 16 99/65 96 %     Pain level: 0 to 5 out of 10  Pain location:low back  Pain interventions:Pain medication, rest, positioning,body mechanics      Patient education:PT POC and goals, Bed mobility training,transfer training, gait training, fall precautions, activity pacing, spinal precautions, body mechanics, Patient verbalizing understanding and demonstrating partial understanding of patient education. Recommend follow up education. Interdisciplinary Communication:spoke with OT regarding goals and LOS, spoke with RN regarding patient urinating with 375 cc output       MMT Initial Asssessment   Right Lower Extremity Left Lower Extremity   Hip Flexion 4 4   Knee Extension 5 5   Knee Flexion 4+ 4+   Ankle Dorsiflexion 1 1   0/5 No palpable muscle contraction  1/5 Palpable muscle contraction, no joint movement  2-/5 Less than full range of motion in gravity eliminated position  2/5 Able to complete full range of motion in gravity eliminated position  2+/5 Able to initiate movement against gravity  3-/5 More than half but not full range of motion against gravity  3/5 Able to complete full range of motion against gravity  3+/5 Completes full range of motion against gravity with minimal resistance  4-/5 Completes full range of motion against gravity with minimal-moderate resistance  4/5 Completes full range of motion against gravity with moderate resistance  4+/5 Completes full range of motion against gravity with moderate-maximum resistance  5/5 Completes full range of motion against gravity with maximum resistance     AROM: hip generally decreased but functional, knee WNL, ankle DF 0 due to weakness    FIM SCORES Initial Assessment   Bed/Chair/Wheelchair Transfers 4   Wheelchair Mobility  (NT secondary to primary mode of locomotion is ambulation)   Walking Shannon 2   Steps/Stairs  (NT)   PRIMARY MODE OF LOCOMOTION: ambulation  Please see IRC Interdisciplinary Eval: Coordination/Balance Section for details regarding FIM score description.     BED/CHAIR/WHEELCHAIR TRANSFERS Initial Assessment   Rolling Right 5 (Supervision) using bed rail   Rolling Left 5 (Supervision)using bed rail   Supine to Sit 5 (Supervision)using bed rail   Sit to Stand Contact guard assistance   Sit to Supine 5 (Supervision)   Transfer Assist Score 4   Transfer Type SPT with walker   Comments Increased time and effort to complete with cues for log rolling and body mechanics for supine<>sidelyiing<>sit<>stand. Car Transfer Not tested   Car Type         WHEELCHAIR MOBILITY/MANAGEMENT Initial Assessment   Able to Propel  NA   Functional Level  (NT secondary to primary mode of locomotion is ambulation)   Curbs/ramps assistance required 0 (Not tested)   Wheelchair set up assistance required 0 (Not tested)   Wheelchair management   NA       WALKING INDEPENDENCE Initial Assessment   Assistive device Walker, rolling, Gait belt, Other (comment) (bilateral ace wraps for DF assist)   Ambulation assistance - level surface 4 (Minimal assistance)   Distance 100 Feet (ft) (100ft x 2)   Functional Level 2   Comments slow cont partial step through gait pattern with marching type gait pattern due to foot drop with ankle PF and knee flex at initial contact.    Ambulation assistance - unlevel surface 0 (Not tested)       STEPS/STAIRS Initial Assessment   Steps/Stairs ambulated 0   Rail Use     Functional Level  (NT)   Comments Unable to ambulate up/down steps at this time due to bilateral foot drop, back pain and hip weakness   Curbs/Ramps 0 (Not tested)     SUPINE EXERCISES Sets Reps Comments   Ankle Pumps 1 20 AAROM   Heel Slides 2 10 Min assist   Hip Abduction 2 10 Min assist   Short Arc Quad 2 10        QUALITY INDICATOR ASSIST COMMENTS   Walk 10 feet With RW and min assist    Walk 50 feet with 2 turns With Rw and min assist    Walk 150 feet Unable at this time due to decreased endurance,back pain and bilateral foot drop    Walk 10 feet on uneven  NT secondary to recent back surgery    1 step/curb Unable at this time due to ,back pain and bilateral foot drop    4 steps Unable at this time due to back pain and bilateral foot drop    12 steps Unable at this time due to decreased endurance,back pain and bilateral foot drop     object NT due to recent back surgery with spinal precautions    Wheel 50' w/2 turns NT secondary to primary mode of locomotion is ambulation    Wheel 150' NA             PHYSICAL THERAPY PLAN OF CARE    Therapy Diagnosis:   Please see table above    Order received from MD for physical therapy services and chart reviewed. Pt to be seen at least 5 times per week for at least 1.5 hours of physical therapy per day for 7 to 10 days. Thank you for the referral.    LTGs:  LTG 1. Patient log roll left and right and transfer supine<>sidelying<>sit demonstrating correct body mechanics without use of bed rail and modified independence in 7 days   LTG 2. Patient transfer sit<>stand and perform stand pivot transfer with LRAD and modified independence demonstrating correct body mechanics in 7 to 10 days   LTG 3. Patient ambulate 400ft with LRAD and appropriate orthotics for bilateral foot drop and modified independence demonstrating improved gait pattern in 7 to 10 days   LTG 4. Patient ambulate up/down 4 steps with bilateral hand rails and appropriate orthotics for foot drop and modified independence in 7 to 10 days   LTG 5. Patient ambulate up/down 6 inch step with LRAD, appropriate orthotics for bilateral foot drop and modified independence in 7 to 10 days     Pt would benefit from skilled physical therapy in order to improve independent functional mobility within the home. Interventions may include range of motion (AROM, PROM B LE/trunk), motor function (B LE/trunk strengthening/coordination), activity tolerance (vitals, oxygen saturation levels), bed mobility training, balance activities, gait training (progressive ambulation program), and functional transfer training. Please see IRC;  Interdisciplinary Eval, Care Plan, and Patient Education for further information regarding physical therapy examination and plan of care. Patient return to room at end of treatment and remained up in wheelchair with needs in reach.     Elias Mae, PT  12/30/2017

## 2017-12-31 PROCEDURE — 74011250637 HC RX REV CODE- 250/637: Performed by: PHYSICAL MEDICINE & REHABILITATION

## 2017-12-31 PROCEDURE — 74011250636 HC RX REV CODE- 250/636: Performed by: PHYSICAL MEDICINE & REHABILITATION

## 2017-12-31 PROCEDURE — 65310000000 HC RM PRIVATE REHAB

## 2017-12-31 RX ADMIN — FAMOTIDINE 20 MG: 20 TABLET, FILM COATED ORAL at 20:55

## 2017-12-31 RX ADMIN — HYDROCODONE BITARTRATE AND ACETAMINOPHEN 1 TABLET: 10; 325 TABLET ORAL at 09:08

## 2017-12-31 RX ADMIN — HEPARIN SODIUM 5000 UNITS: 5000 INJECTION, SOLUTION INTRAVENOUS; SUBCUTANEOUS at 20:55

## 2017-12-31 RX ADMIN — FLUCONAZOLE 200 MG: 100 TABLET ORAL at 08:56

## 2017-12-31 RX ADMIN — HYDROCODONE BITARTRATE AND ACETAMINOPHEN 1 TABLET: 10; 325 TABLET ORAL at 16:16

## 2017-12-31 RX ADMIN — FAMOTIDINE 20 MG: 20 TABLET, FILM COATED ORAL at 09:11

## 2017-12-31 RX ADMIN — DULOXETINE HYDROCHLORIDE 60 MG: 30 CAPSULE, DELAYED RELEASE ORAL at 20:55

## 2017-12-31 RX ADMIN — TAMSULOSIN HYDROCHLORIDE 0.4 MG: 0.4 CAPSULE ORAL at 08:56

## 2017-12-31 RX ADMIN — STANDARDIZED SENNA CONCENTRATE AND DOCUSATE SODIUM 2 TABLET: 8.6; 5 TABLET, FILM COATED ORAL at 08:57

## 2017-12-31 RX ADMIN — HEPARIN SODIUM 5000 UNITS: 5000 INJECTION, SOLUTION INTRAVENOUS; SUBCUTANEOUS at 09:01

## 2017-12-31 RX ADMIN — HYDROCODONE BITARTRATE AND ACETAMINOPHEN 1 TABLET: 10; 325 TABLET ORAL at 20:55

## 2017-12-31 RX ADMIN — POLYETHYLENE GLYCOL (3350) 17 G: 17 POWDER, FOR SOLUTION ORAL at 08:56

## 2017-12-31 NOTE — PROGRESS NOTES
End Of Shift Functional Summary, Nursing      TOILETING:  Does patient need assist with clothing management and/or pericare? No    TOILET TRANSFER:  Pt requires minimal assistance. Pt uses wheelchair. BLADDER:  Pt does not have a muniz catheter that staff manages. Pt does take medication. Pt is continent. of bladder and voids in toilet  Pt requires staff to position device Pt has had 0 bladder accidents during this shift .  (An accident is when the episode is not contained in a brief AND/OR the clothing/linen requires changing/cleaning up.)    BOWEL:  Pt does take medication. Pt is continent of bowel and uses toilet. Pt requires staff to position device    Pt has had 0 bowel accidents during this shift . (An accident is when the episode is not contained in a brief AND/OR the clothing/linen requires changing/cleaning up.)    BED/CHAIR TRANSFER  Pt requires minimal assistance. Patient requires the assistance of 1 staff member(s). Pt uses walker        Documentation reviewed and plan of care discussed/reviewed with   patient, oncoming nurse and patient assistant during the shift.

## 2017-12-31 NOTE — PROGRESS NOTES
Problem: Falls - Risk of  Goal: *Absence of Falls  Document Judith Fall Risk and appropriate interventions in the flowsheet.    Outcome: Progressing Towards Goal  Fall Risk Interventions:  Mobility Interventions: Assess mobility with egress test, Communicate number of staff needed for ambulation/transfer, OT consult for ADLs, Patient to call before getting OOB, PT Consult for assist device competence, PT Consult for mobility concerns, Strengthening exercises (ROM-active/passive), Utilize walker, cane, or other assitive device         Medication Interventions: Assess postural VS orthostatic hypotension, Bed/chair exit alarm, Evaluate medications/consider consulting pharmacy, Patient to call before getting OOB, Teach patient to arise slowly    Elimination Interventions: Call light in reach, Elevated toilet seat, Patient to call for help with toileting needs, Toilet paper/wipes in reach, Toileting schedule/hourly rounds

## 2017-12-31 NOTE — PROGRESS NOTES
Problem: Falls - Risk of  Goal: *Absence of Falls  Document Judith Fall Risk and appropriate interventions in the flowsheet.    Outcome: Progressing Towards Goal  Fall Risk Interventions:  Mobility Interventions: Assess mobility with egress test, Patient to call before getting OOB         Medication Interventions: Evaluate medications/consider consulting pharmacy, Patient to call before getting OOB    Elimination Interventions: Call light in reach, Patient to call for help with toileting needs

## 2017-12-31 NOTE — PROGRESS NOTES
Pt rested well through the night. Monitored hourly. Just assisted to bathroom and back to bed. Denies any further needs at this time.

## 2018-01-01 LAB
BACTERIA SPEC CULT: NORMAL
SERVICE CMNT-IMP: NORMAL

## 2018-01-01 PROCEDURE — 97530 THERAPEUTIC ACTIVITIES: CPT

## 2018-01-01 PROCEDURE — 97110 THERAPEUTIC EXERCISES: CPT

## 2018-01-01 PROCEDURE — 97535 SELF CARE MNGMENT TRAINING: CPT

## 2018-01-01 PROCEDURE — 65310000000 HC RM PRIVATE REHAB

## 2018-01-01 PROCEDURE — 74011250636 HC RX REV CODE- 250/636: Performed by: PHYSICAL MEDICINE & REHABILITATION

## 2018-01-01 PROCEDURE — 97537 COMMUNITY/WORK REINTEGRATION: CPT

## 2018-01-01 PROCEDURE — 74011250637 HC RX REV CODE- 250/637: Performed by: PHYSICAL MEDICINE & REHABILITATION

## 2018-01-01 PROCEDURE — 97116 GAIT TRAINING THERAPY: CPT

## 2018-01-01 RX ADMIN — STANDARDIZED SENNA CONCENTRATE AND DOCUSATE SODIUM 2 TABLET: 8.6; 5 TABLET, FILM COATED ORAL at 08:45

## 2018-01-01 RX ADMIN — DULOXETINE HYDROCHLORIDE 60 MG: 30 CAPSULE, DELAYED RELEASE ORAL at 21:22

## 2018-01-01 RX ADMIN — FAMOTIDINE 20 MG: 20 TABLET, FILM COATED ORAL at 08:46

## 2018-01-01 RX ADMIN — HEPARIN SODIUM 5000 UNITS: 5000 INJECTION, SOLUTION INTRAVENOUS; SUBCUTANEOUS at 11:25

## 2018-01-01 RX ADMIN — HEPARIN SODIUM 5000 UNITS: 5000 INJECTION, SOLUTION INTRAVENOUS; SUBCUTANEOUS at 21:22

## 2018-01-01 RX ADMIN — FAMOTIDINE 20 MG: 20 TABLET, FILM COATED ORAL at 21:22

## 2018-01-01 RX ADMIN — POLYETHYLENE GLYCOL (3350) 17 G: 17 POWDER, FOR SOLUTION ORAL at 08:46

## 2018-01-01 RX ADMIN — FLUCONAZOLE 200 MG: 100 TABLET ORAL at 08:45

## 2018-01-01 RX ADMIN — HYDROCODONE BITARTRATE AND ACETAMINOPHEN 1 TABLET: 10; 325 TABLET ORAL at 12:20

## 2018-01-01 RX ADMIN — TAMSULOSIN HYDROCHLORIDE 0.4 MG: 0.4 CAPSULE ORAL at 08:45

## 2018-01-01 RX ADMIN — HYDROCODONE BITARTRATE AND ACETAMINOPHEN 1 TABLET: 10; 325 TABLET ORAL at 07:12

## 2018-01-01 RX ADMIN — HYDROCODONE BITARTRATE AND ACETAMINOPHEN 1 TABLET: 10; 325 TABLET ORAL at 18:07

## 2018-01-01 NOTE — PROGRESS NOTES
[facility] PROGRESS NOTE    Jazmine Basilio  Admit Date: 12/29/2017    Chief Complaint : Gait dysfunction secondary to below. Admit Diagnosis: spinal stenosis; Incomplete paraplegia (HCC)  Neurogenic bowel and bladder  Acute Rehab Dx:  Gait impairment/ gait dysfunction  Debility    deconditioning  Mobility and ambulation deficits  Self Care/ADL deficits     Subjective     Patient seen and examined. Vss. Afebrile. No new neurologic setbacks. + few BMs yesterday without accidents. States she feels urge. Continent of bladder, no accidents.      Objective:     Current Facility-Administered Medications   Medication Dose Route Frequency    acetaminophen (TYLENOL) tablet 650 mg  650 mg Oral Q4H PRN    famotidine (PEPCID) tablet 20 mg  20 mg Oral Q12H    HYDROcodone-acetaminophen (NORCO)  mg tablet 1 Tab  1 Tab Oral Q4H PRN    senna-docusate (PERICOLACE) 8.6-50 mg per tablet 2 Tab  2 Tab Oral DAILY    heparin (porcine) injection 5,000 Units  5,000 Units SubCUTAneous Q12H    alum-mag hydroxide-simeth (MYLANTA) oral suspension 30 mL  30 mL Oral Q4H PRN    bisacodyl (DULCOLAX) suppository 10 mg  10 mg Rectal DAILY PRN    DULoxetine (CYMBALTA) capsule 60 mg  60 mg Oral QHS    lactulose (CHRONULAC) solution 30 g  30 g Oral PRN    magnesium hydroxide (MILK OF MAGNESIA) 400 mg/5 mL oral suspension 30 mL  30 mL Oral DAILY PRN    ondansetron (ZOFRAN ODT) tablet 8 mg  8 mg Oral Q6H PRN    polyethylene glycol (MIRALAX) packet 17 g  17 g Oral DAILY    simethicone (MYLICON) tablet 80 mg  80 mg Oral QID PRN    sodium phosphate (FLEET'S) enema 118 mL  1 Enema Rectal PRN    tamsulosin (FLOMAX) capsule 0.4 mg  0.4 mg Oral DAILY    temazepam (RESTORIL) capsule 30 mg  30 mg Oral QHS PRN    traMADol (ULTRAM) tablet 50 mg  50 mg Oral Q6H PRN    fluconazole (DIFLUCAN) tablet 200 mg  200 mg Oral DAILY     Review of Systems  :Denies chest pain, shortness of breath, cough, headache, visual problems, abdominal pain, dysurea, calf pain. Pertinent positives are as noted in the medical records and unremarkable otherwise. Physical Exam:   General: Alert and age appropriately oriented. No acute cardio respiratory distress. HEENT: Normocephalic,   no scleral icterus  Oral mucosa moist without cyanosis   Lungs: Clear to auscultation  bilaterally. Respiration even and unlabored   Heart: Regular rate and rhythm, S1, S2   No  murmurs, clicks, rub or gallops   Abdomen: Soft, non-tender, nondistended. Bowel sounds present. No organomegaly. Genitourinary: defered   Neuromuscular:         LLE     Hip flexion:   5/5              Knee extension:  5 /5    Ankle dorsiflexion:   1+/5   EHL:   0/5   Ankle plantarflexion:   5-/5        RLE     Hip flexion:  5- /5   Knee extension:   5/5    Ankle dorsiflexion:  2- /5   EHL:   0/5   Ankle plantarflexion:   5-/5  Sensory - + areas of LLE, RLE sensory deificts      Skin/extremity: No rashes, no erythema. No calf tenderness BLE  Wound covered. Back incision C/D/I                                                                            Functional Assessment:          Balance  Sitting - Static: Good (unsupported) (12/30/17 1200)  Sitting - Dynamic: Good (unsupported) (12/30/17 1200)  Standing - Static: Fair (12/30/17 1200)                     Doreatha Schwab Fall Risk Assessment:  Doreatha Schwab Fall Risk  Mobility: Ambulates or transfers with assist devices or assistance/unsteady gait (01/01/18 0815)  Mobility Interventions: Communicate number of staff needed for ambulation/transfer;Patient to call before getting OOB;Utilize walker, cane, or other assitive device (01/01/18 0815)  Mentation: Alert, oriented x 3 (01/01/18 0815)  Medication: Patient receiving anticonvulsants, sedatives(tranquilizers), psychotropics or hypnotics, hypoglycemics, narcotics, sleep aids, antihypertensives, laxatives, or diuretics (01/01/18 0815)  Medication Interventions: Patient to call before getting OOB; Evaluate medications/consider consulting pharmacy (01/01/18 4485)  Elimination: Needs assistance with toileting (01/01/18 0815)  Elimination Interventions: Call light in reach; Patient to call for help with toileting needs; Elevated toilet seat; Toilet paper/wipes in reach (01/01/18 0815)  Prior Fall History: No (01/01/18 0815)  Total Score: 3 (01/01/18 0815)  Standard Fall Precautions: Yes (12/30/17 2040)  High Fall Risk: Yes (01/01/18 0815)     Speech Assessment:         Ambulation:  Gait  Distance (ft): 100 Feet (ft) (100ft x 2) (12/30/17 1200)  Assistive Device: Walker, rolling;Gait belt; Other (comment) (bilateral ace wraps for DF assist) (12/30/17 1200)     Labs/Studies:  Recent Results (from the past 72 hour(s))   CULTURE, URINE    Collection Time: 12/29/17  9:59 PM   Result Value Ref Range    Special Requests: NO SPECIAL REQUESTS      Culture result: NO GROWTH 2 DAYS     URINALYSIS W/ RFLX MICROSCOPIC    Collection Time: 12/29/17  9:59 PM   Result Value Ref Range    Color YELLOW      Appearance CLOUDY      Specific gravity 1.014 1.001 - 1.023      pH (UA) 7.0 5.0 - 9.0      Protein NEGATIVE  NEG mg/dL    Glucose NEGATIVE  mg/dL    Ketone NEGATIVE  NEG mg/dL    Bilirubin NEGATIVE  NEG      Blood NEGATIVE  NEG      Urobilinogen 0.2 0.2 - 1.0 EU/dL    Nitrites NEGATIVE  NEG      Leukocyte Esterase NEGATIVE  NEG     CBC W/O DIFF    Collection Time: 12/30/17  7:17 AM   Result Value Ref Range    WBC 10.6 4.3 - 11.1 K/uL    RBC 3.72 (L) 4.05 - 5.25 M/uL    HGB 11.9 11.7 - 15.4 g/dL    HCT 36.8 35.8 - 46.3 %    MCV 98.9 (H) 79.6 - 97.8 FL    MCH 32.0 26.1 - 32.9 PG    MCHC 32.3 31.4 - 35.0 g/dL    RDW 12.5 11.9 - 14.6 %    PLATELET 460 650 - 216 K/uL    MPV 9.7 (L) 10.8 - 54.7 FL   METABOLIC PANEL, COMPREHENSIVE    Collection Time: 12/30/17  7:17 AM   Result Value Ref Range    Sodium 139 136 - 145 mmol/L    Potassium 4.0 3.5 - 5.1 mmol/L    Chloride 103 98 - 107 mmol/L    CO2 30 21 - 32 mmol/L    Anion gap 6 (L) 7 - 16 mmol/L Glucose 94 65 - 100 mg/dL    BUN 20 6 - 23 MG/DL    Creatinine 0.62 0.6 - 1.0 MG/DL    GFR est AA >60 >60 ml/min/1.73m2    GFR est non-AA >60 >60 ml/min/1.73m2    Calcium 8.8 8.3 - 10.4 MG/DL    Bilirubin, total 0.3 0.2 - 1.1 MG/DL    ALT (SGPT) 117 (H) 12 - 65 U/L    AST (SGOT) 35 15 - 37 U/L    Alk. phosphatase 99 50 - 136 U/L    Protein, total 7.1 6.3 - 8.2 g/dL    Albumin 3.1 (L) 3.5 - 5.0 g/dL    Globulin 4.0 (H) 2.3 - 3.5 g/dL    A-G Ratio 0.8 (L) 1.2 - 3.5         Assessment:     Problem List as of 1/1/2018  Date Reviewed: 12/23/2017          Codes Class Noted - Resolved    Incomplete paraplegia [facility]) ICD-10-CM: G82.22  ICD-9-CM: 344.1  12/29/2017 - Present        Cauda equina syndrome with neurogenic bladder [facility]) ICD-10-CM: G83.4  ICD-9-CM: 344.61  12/23/2017 - Present              Assessment/ plan:         Assessment:L4-5 disc herniation with extrusion with severe narrowing and compression. s/p left L4-5 partial hemilaminectomy, discectomy, and foraminotomy for decompression of nerves due to acute cauda equina syndrome. This was performed on 12/23,      The Post Assessment Physician Evaluation (AMARILIS) found the current functional status to be comparable with the Pre-admission Screening. The Patient is a good candidate for acute inpatient rehabilitation. Nothing since the Pre-admission screen has changed that determination.      Rehabilitation Plan  The patient has shown the ability to tolerate and benefit from 3 hours of therapy daily and is being admitted to a comprehensive acute inpatient rehabilitation program consisting of at least 3 hours of combined physical and occupational therapies. Begin intensive Physical Therapy for a minimum of 1.5 hours a day, at least 5 out of 7 days per week to address bed mobility, transfers, ambulation, strengthening, balance, and endurance. Focus on LE strength funciton.    Begin intensive Occupational Therapy for a minimum of 1.5 hours a day, at least 5 out of 7 days per week to address ADL ( bathing, LE dressing, toileting) and adaptive equipment as needed.     The patient will also require 24-hour skilled rehabilitation nursing for bowel and bladder management, skin care for decubitus ulcer prevention , pain management and ongoing medication administration      The patient may benefit from a psychology consult for depression, adjustment disorder.         Continue daily physician medical management:  Pneumonia prophylaxis- Incentive spirometer every hour while awake     DVT risk / DVT Prophylaxis- Will require daily physician exam to assess for signs and symptoms as patient is at increased risk for of thromboembolism. Mobilization as tolerated. Intermittent pneumatic compression devices when in bed Thigh-high or knee-high thromboembolic deterrent hose when out of bed. -  started, continued on Heparin 5000 bid     Pain Control: stable, mild-to-moderate joint symptoms intermittently, reasonably well controlled by PRN meds. Will require regular pain assessment and comprenhensive pain management,   - no new pain source. Needing few prn norco /day for surgical back pain.       Wound Care: Monitor wound status daily per staff and physician. At risk for failure. Will require 24/7 rehab nursing. Keep wound clean and dry     Hypotension; teds prn;   - 1/1 - asymptomatic. Monitor orthostatics. SBP 100s-130s.     Hx of depression; cont Cymbalta; has been coping very well     Bilateral foot drop; neuro deficits appear to be mostly at L4., L5 roots. - will need maria esther AFOs,.cont. DF assist for PT.       Urinary retention/ neurogenic bladder -  -PVRs minimal to none. Voiding spontaneously. No accidents. Will PVR daily. Monitor.   - UA -neg.   - added Flomax at admission. Voiding spontaneously, pvrs minimal.       candidiasis. -  Diflucan. treat for 3d oral diflucan    bowel program - cont Miralax, pericolace, daily suppos/ prn lactulose, fleets. Had mod BM yesterday and today.  Denies soiling.  - 1/1 continent of bowel. Feels urge.      GERD - resume PPI. At times may need additional antacids, Maalox prn. Time spent was 25 minutes with over 1/2 in direct patient care/examination, consultation and coordination of care.      Signed By: Tabby Conde MD     January 1, 2018

## 2018-01-01 NOTE — PROGRESS NOTES
End Of Shift Functional Summary, Nursing      TOILETING:  Does patient need assist with clothing management and/or pericare? No    TOILET TRANSFER:  Pt requires minimal assistance. Pt uses wheelchair. BLADDER:  Pt does not have a muniz catheter that staff manages. Pt does take medication. Pt is continent. of bladder and voids in toilet  Pt requires staff to empty device Pt has had 0 bladder accidents during this shift requiring minimal assistance to clean up. (An accident is when the episode is not contained in a brief AND/OR the clothing/linen requires changing/cleaning up.)    BOWEL:  Pt does take medication. Pt is continent of bowel and uses toilet. Pt has had 0 bowel accidents during this shift . (An accident is when the episode is not contained in a brief AND/OR the clothing/linen requires changing/cleaning up.)    BED/CHAIR TRANSFER  Pt requires minimal assistance. Patient requires the assistance of 1 staff member(s). Pt uses walker          EATING  Pt requires no assistance. Pt does not wear dentures. TUBE FEEDINGS:  Pt does not  receive nutrition through tube feedings. Patient requires noassistance with feedings. Documentation reviewed and plan of care discussed/reviewed with   patient during the shift.

## 2018-01-01 NOTE — PROGRESS NOTES
OT Daily Note  Time In 0919   Time Out 1003     Subjective: \"My pain went down to about a 2 or 3, but increases a little while I'm standing. \" patient states. Agreeable to therapy. Pain: 3/10, lower back  Education: energy conservations, provided handout and reviewed material  Precautions: Spinal, Other (comment) (fall precautions)    Activity Tolerance Daily Assessment   Therapist facilitated standing activity requiring crossing midline and reaching by moving rings from rung ladder, up, across and down. Patient completed tasks with minimal vc's and x0 rest breaks, ~5-6 minutes before break. Therapist facilitated a second standing tolerance activity with peg board and rubber bands. Therapist directed client in following pattern using rubberband. Good tolerance, completed tasks for ~4-6 minutes without break. Good tolerance, require cuing for pacing and energy conservation. Support used with UE and leaning into table, occasional cuing to complete task without support. Education Daily Assessment   Therapist provided and educated patient on energy conservation while in hospital and at home. Provided handout and highlighted important aspects of information. Mrs. Arely Mcelroy was very receptive and reports she has already began modifications at home prior to surgery. IADL Daily Assessment   Therapist facilitated laundry task at heightened table with basket to right of patient. Provided initial cuing on completion of task while adhering to spinal precautions. Completed activity for ~10 minutes with one 2 minute rest break due to fatigue and lower extremity weakness. Good tolerance for activity. Patient acknowledges and request break when needed. Support used with UE and leaning into table, occasional cuing to complete task without support. Assessment: Patient tolerated session well and participated in all therapist directed tasks. Mrs. Arely Mcelroy completed various standing tolerance/endurance, strength, and functional tasks during treatment session. Therapist facilitated tasks standing at heightened table with cuing for decrease UE support on table to assist with improved strength, endurance, and tolerance to tasks. Therapist provided education material and vc's for adherence to spinal precautions. Concluded session with patient returned to room in w/c at bedside table with call light within reach. Plan: Continue with current POC and address goals for improved functional performance and independence.      Nuvia Reilly, OTD, OTR/L  1/1/18

## 2018-01-01 NOTE — PROGRESS NOTES
OT Daily Note  Time In 1307   Time Out 1352     Subjective: \"I took a pain pill around 12:20 so I'm doing pretty good. \" pt states when therapist asks pain   Pain: 3/10  Education: pacing, energy conservation   Interdisciplinary Communication:Communicated with PT, discussed assistive ortho bracing to assist with foot drop on L foot/ankle  Precautions: Spinal, Other (comment) (fall precautions)    IADL- Grocery Simulation   Therapist facilitated simulated grocery task with use of shopping cart and [address] [facility] grocery area. Therapist provided patient a list of items to retrieve and reminded patient of spinal precautions. Patient completed tasks with cuing x5 for pacing, precautions, and energy conservation. Therapist provided SBA with occasional CGA for safety. Patient adhered to precautions and asked for assistance when needed. Mrs. Hugh Epps completed task while standing or ambulating with shopping cart for 10 minutes before taking a seated rest break. Activity Tolerance   Therapist facilitated standing tolerance and LE strengthening at stander while completing word search activity. Patient presented with leaning support, 25-30% at LE. Therapist provided feedback on patient's stance and positioning for improved tolerance to activity. Therapist provided SBA for safety and balance. Completed activity for about 10-12 minutes before taking a rest break. Assessment: Mrs. Hugh Epps demonstrated great participation during today's session. Improved activity tolerance with minimal change in pain. Patient reported using strategies discussed in prior session during first activity of session. Therapist facilitated activities listed above to assist with improved balance, strength, functional mobility, and activity tolerance. Concluded session with patient seated in w/c at bedside table with call light in reach.      Plan: Continue with POC and address current goals for improved functional performance and increased independence with self-cares/ADLs and IADLs.     Alyssa Perez, OTR/L  1/1/18

## 2018-01-01 NOTE — PROGRESS NOTES
Problem: Falls - Risk of  Goal: *Absence of Falls  Document Judith Fall Risk and appropriate interventions in the flowsheet.    Outcome: Progressing Towards Goal  Fall Risk Interventions:  Mobility Interventions: Utilize walker, cane, or other assitive device         Medication Interventions: Patient to call before getting OOB    Elimination Interventions: Call light in reach

## 2018-01-01 NOTE — PROGRESS NOTES
OT Daily Note  Time In 0701   Time Out 0746     Subjective: \"Can I get my pain medication before my shower? \" patient requested, aggred to therapy  Pain: 5/10; nursing notified   Education: spinal precautions, energy conservation  Interdisciplinary Communication: notified nursing about pain medication   Precautions: Spinal, Other (comment) (fall precautions)    Self-Care      01/01/18 0701   Time Spent With Patient   Time In 0701   Time Out 0746   Patient Seen For: AM;ADLs   Upper Body Bathing   Bathing Assistance, Upper S   Position Performed Other (comment)  (seated on TTB)   Comments completed with set up assistance for bathing    Lower Body Bathing   Bathing Assistance, Lower  SBA   Adaptive Equipment Grab bar   Position Performed Other (comment)  (seated on TTB)   Adaptive Equipment Grab bar   Comments set up assistance, SBA while standing to complete millicent care   Toileting   Toileting Assistance (FIM Score) NT  (pt reported completing 45 minutes before treatment session)   [address] set up assistance, seated on TTB   Lower Body Dressing    Dressing Assistance  SBA   Leg Crossed Method Used Yes   Position Performed Other (comment)  (seated on TTB, SBA while standing)   Adaptive Equipment Used Grab bar  (UE Support)   Don/Doff Anti-Embolic Stockings NA   Functional Transfers   Tub or Shower Type Shower   Amount of Assistance Required SBA   Adaptive Equipment Grab bars; Wheelchair        Assessment: Good tolerance and participation during morning ADL's. Patient continues to use GB for support during transfers and while completing bathing and dressing. Therapist provided vc's for energy conservation and spinal precautions x2 during treatment session. Plan: Continue with current POC and address goals for improved functional performance.     Cas Maguire, CALLID, OTR/L  1/1/18

## 2018-01-01 NOTE — PROGRESS NOTES
End Of Shift Functional Summary, Nursing        TOILETING:  Does patient need assist with clothing management and/or pericare? No     TOILET TRANSFER:  Pt requires minimal assistance. Pt uses wheelchair.     BLADDER:  Pt does not have a muniz catheter that staff manages. Pt does take medication. Pt is continent of bladder and voids in toilet. Pt requires staff to position device. Pt has had 0 bladder accidents during this shift .  (An accident is when the episode is not contained in a brief AND/OR the clothing/linen requires changing/cleaning up.)     BOWEL:  Pt does take medication. Pt is continent of bowel and uses toilet. Pt requires staff to position device. Pt has had 0 bowel accidents during this shift . (An accident is when the episode is not contained in a brief AND/OR the clothing/linen requires changing/cleaning up.)     BED/CHAIR TRANSFER  Pt requires minimal assistance. Patient requires the assistance of 1 staff member(s). Pt uses walker.     Documentation reviewed and plan of care discussed/reviewed with   patient, oncoming nurse and patient assistant during the shift.

## 2018-01-01 NOTE — PROGRESS NOTES
PHYSICAL THERAPY DAILY NOTE  Time In: 8526  Time Out: 1371  Patient Seen For: AM;Balance activities;Gait training;Patient education; Therapeutic exercise;Transfer training; Other (see progress notes)    Subjective: Patient reporting she feels better today. Reports she feels like she is walking more normal when she has the orthotic devices on. Reports she likes the foot up device the best. Reports the thermoplastic AFO in uncomfortable. Reports she carbon fiber AFO feels like to is \"too much\" brace. Objective:Vital Signs:  Patient Vitals for the past 12 hrs:   Temp Pulse Resp BP SpO2   01/01/18 0805 98.5 °F (36.9 °C) (!) 108 19 109/65 95 %     Pain level: 0 to 4 out of 10  Pain location:low back  Pain interventions:Pain medication, rest, positioning,body mechanics    Patient education:Balance training,transfer training, gait training, fall precautions, activity pacing,body mechanics, spinal precautions, Patient verbalizing understanding and demonstrating partial understanding of patient education. Recommend follow up education. Interdisciplinary Communication:NA    Spinal  GROSS ASSESSMENT Daily Assessment     NA       BED/MAT MOBILITY Daily Assessment    Rolling Right : 0 (Not tested)  Rolling Left : 0 (Not tested)  Supine to Sit : 0 (Not tested)  Sit to Supine : 0 (Not tested)       TRANSFERS Daily Assessment   Increased time and effort to complete with cues for body mechanics   Transfer Type: SPT with walker  Transfer Assistance : 5 (Stand-by assistance)  Sit to Stand Assistance: Stand-by assistance  Car Transfers: Not tested       GAIT Daily Assessment   Gait training x 150ft x 1 with [place] Foot up device on right foot and ace wrap on left foot. Gait training x 150ft x 1 with carbon fiber AFO on left foot and [place] Foot up device in right foot.   Gait training x 150ft x 1 with off the shelf thermoplastic AFO on right foot and [place] foot up device on left foot Amount of Assistance: 5 (Stand-by assistance)  Distance (ft): 150 Feet (ft) x 3  Assistive Device: Walker, rolling;Gait belt; Other (comment) (carbon fiber AFO, Off the shelf thermoplastic AFO,)   Gait training x 40ft x 2 in parallel bars with SBA and verbal cues     STEPS or STAIRS Daily Assessment   Single step at a time with increased time and effort to complete going up/down steps. Cues for posture Steps/Stairs Ambulated (#): 4 x 2 with 2 min sitting rest break  Level of Assist : 4 (Contact guard assistance)  Rail Use: Both       BALANCE Daily Assessment    Sitting - Static: Good (unsupported)  Sitting - Dynamic: Good (unsupported)  Standing - Static: Fair  Standing - Dynamic : Impaired       WHEELCHAIR MOBILITY Daily Assessment    Curbs/Ramps Assist Required (FIM Score): 0 (Not tested)  Wheelchair Setup Assist Required : 0 (Not tested)       LOWER EXTREMITY EXERCISES Daily Assessment    Extremity: Both  Exercise Type #1: Other (comment) (Single limb stance stability with single UE support in parallel bars facilitating closed chain hip ABD on single limb stance stability with 10 sec isometric holds  Sets Performed: 2  Reps Performed: 10  Level of Assist: Stand-by assistance (with verbal, visual and tactile cues)          Assessment: Progressing towards goals. Improved gait with orthotic devices to assist with bilateral foot drop. Ongoing assessment to determine which orthotic device will provide correct amount of assistance for foot drop. Sensory deficits contributing to balance deficits in standing. Body mechanics with transfers improving. At this time pain is not interfering with progress. Patient return to room at end of treatment and remained up in wheelchair with needs in reach. Plan of Care: Continue with POC and progress as tolerated.      Ashley Hall, PT  1/1/2018

## 2018-01-01 NOTE — PROGRESS NOTES
PHYSICAL THERAPY DAILY NOTE  Time In: 1432  Time Out: 7225  Patient Seen For: PM;Gait training;Patient education; Therapeutic exercise;Transfer training; Other (see progress notes)    Subjective: patient reporting her bed at home is tall. Reports she has to climb into it. Reports she will go home in a smaller SUV         Objective:Vital Signs:  Patient Vitals for the past 12 hrs:   Temp Pulse Resp BP SpO2   01/01/18 0805 98.5 °F (36.9 °C) (!) 108 19 109/65 95 %     Pain level: 2 to 5 out of 10, increases with supine<>sit<>stand and prolonged supine lying  Pain location:low back  Pain interventions:Pain medication, rest, positioning,body mechanics      Patient education:Bed mobility training,transfer training, gait training, fall precautions, activity pacing, spinal precautions, body mechanics, Patient verbalizing understanding and demonstrating partial understanding of patient education. Recommend follow up education. Interdisciplinary Communication:NA    Spinal  GROSS ASSESSMENT Daily Assessment     NA       BED/MAT MOBILITY Daily Assessment   Increased time and effort to complete with cues for body mechanics   Rolling Right : 6 (Modified independent)  Rolling Left : 6 (Modified independent)  Supine to Sit : 5 (Supervision)  Sit to Supine : 4 (Minimal assistance)       TRANSFERS Daily Assessment   Instructed patient in sit<>sstand transfers using 6 inch step stool with mat elevated to 31 inches simulating height of her bed at home Transfer Type: SPT with walker  Transfer Assistance : 4 (Contact guard assistance)  Sit to Stand Assistance: Stand-by assistance  Car Transfers: Not tested       GAIT Daily Assessment    Amount of Assistance: 5 (Stand-by assistance) verbal cues for posture correction to control step length and hip stability during stance  Distance (ft): 150 Feet (ft)  Assistive Device: Walker, rolling; Other (comment) (ace wrap RLE, Ossur Foot up device LLE for DF assist)       STEPS or STAIRS Daily Assessment    NA       BALANCE Daily Assessment    Sitting - Static: Good (unsupported)  Sitting - Dynamic: Good (unsupported)  Standing - Static: Fair  Standing - Dynamic : Impaired       WHEELCHAIR MOBILITY Daily Assessment    Curbs/Ramps Assist Required (FIM Score): 0 (Not tested)  Wheelchair Setup Assist Required : 0 (Not tested)       LOWER EXTREMITY EXERCISES Daily Assessment   Passive heel cord stretch x 30 secs x 3 each ankle Extremity: Both  Exercise Type #1: Supine lower extremity strengthening  Sets Performed: 1  Reps Performed: 30  Level of Assist: Minimal assistance          Assessment: progressing towards goals. Body mechanics with bed mobility improving without use of bed rail and head of bed elevated. recommending patient lower height of bed at home to approximately 24 inches       Patient return to room at end of treatment and remained up in wheelchair with needs in reach. Plan of Care: Continue with POC and progress as tolerated.      Obey Leone, PT  1/1/2018

## 2018-01-01 NOTE — PROGRESS NOTES
Pr c/o pain to lower back 5/10 per pain scale. Pt sitting in W/C, getting ready for a shower  with therapy. Pt given Narco  mg po with sips of water.  at bedside.

## 2018-01-02 PROCEDURE — 97032 APPL MODALITY 1+ESTIM EA 15: CPT

## 2018-01-02 PROCEDURE — 97110 THERAPEUTIC EXERCISES: CPT

## 2018-01-02 PROCEDURE — 97530 THERAPEUTIC ACTIVITIES: CPT

## 2018-01-02 PROCEDURE — 97116 GAIT TRAINING THERAPY: CPT

## 2018-01-02 PROCEDURE — 74011250636 HC RX REV CODE- 250/636: Performed by: PHYSICAL MEDICINE & REHABILITATION

## 2018-01-02 PROCEDURE — 99232 SBSQ HOSP IP/OBS MODERATE 35: CPT | Performed by: PHYSICAL MEDICINE & REHABILITATION

## 2018-01-02 PROCEDURE — 74011250637 HC RX REV CODE- 250/637: Performed by: PHYSICAL MEDICINE & REHABILITATION

## 2018-01-02 PROCEDURE — 97535 SELF CARE MNGMENT TRAINING: CPT

## 2018-01-02 PROCEDURE — 65310000000 HC RM PRIVATE REHAB

## 2018-01-02 RX ADMIN — FLUCONAZOLE 200 MG: 100 TABLET ORAL at 08:28

## 2018-01-02 RX ADMIN — HEPARIN SODIUM 5000 UNITS: 5000 INJECTION, SOLUTION INTRAVENOUS; SUBCUTANEOUS at 21:55

## 2018-01-02 RX ADMIN — POLYETHYLENE GLYCOL (3350) 17 G: 17 POWDER, FOR SOLUTION ORAL at 08:28

## 2018-01-02 RX ADMIN — STANDARDIZED SENNA CONCENTRATE AND DOCUSATE SODIUM 2 TABLET: 8.6; 5 TABLET, FILM COATED ORAL at 08:28

## 2018-01-02 RX ADMIN — DULOXETINE HYDROCHLORIDE 60 MG: 30 CAPSULE, DELAYED RELEASE ORAL at 21:55

## 2018-01-02 RX ADMIN — HEPARIN SODIUM 5000 UNITS: 5000 INJECTION, SOLUTION INTRAVENOUS; SUBCUTANEOUS at 11:22

## 2018-01-02 RX ADMIN — FAMOTIDINE 20 MG: 20 TABLET, FILM COATED ORAL at 08:28

## 2018-01-02 RX ADMIN — HYDROCODONE BITARTRATE AND ACETAMINOPHEN 1 TABLET: 10; 325 TABLET ORAL at 18:19

## 2018-01-02 RX ADMIN — FAMOTIDINE 20 MG: 20 TABLET, FILM COATED ORAL at 21:55

## 2018-01-02 RX ADMIN — TAMSULOSIN HYDROCHLORIDE 0.4 MG: 0.4 CAPSULE ORAL at 08:28

## 2018-01-02 RX ADMIN — HYDROCODONE BITARTRATE AND ACETAMINOPHEN 1 TABLET: 10; 325 TABLET ORAL at 12:22

## 2018-01-02 RX ADMIN — HYDROCODONE BITARTRATE AND ACETAMINOPHEN 1 TABLET: 10; 325 TABLET ORAL at 08:03

## 2018-01-02 NOTE — PROGRESS NOTES
PHYSICAL THERAPY DAILY NOTE  Time In: 5379  Time Out: 9345  Patient Seen For: PM;Gait training;Patient education; Therapeutic exercise;Transfer training; Other (see progress notes)    Subjective: Patient reporting she is not sure when she wants to go home. Reports she wants to get as much therapy as she can but would like to be home with her family. Discussed option for OP PT after D/C. Discussed DME needs and home modification recommendations         Objective:Vital Signs:  Patient Vitals for the past 12 hrs:   Temp Pulse Resp BP SpO2   01/02/18 0730 98.6 °F (37 °C) 92 14 114/77 97 %     Pain level: 0 to 6 out of 10  Pain location:low back and into right buttock  Pain interventions:Pain medication, rest, positioning,body mechanics    Patient education:Bed mobility training,transfer training, gait training, fall precautions, activity pacing, spinal precautions,body mechanics, Patient verbalizing understanding and demonstrating partial understanding of patient education. Recommend follow up education. Interdisciplinary Communication:spoke with MSW and MD regarding D/C plans and DME    Spinal  GROSS ASSESSMENT Daily Assessment     NA       BED/MAT MOBILITY Daily Assessment   Increased time and effort to complete with cues for body mechanics and spinal precautions   Rolling Right : 6 (Modified independent)  Rolling Left : 6 (Modified independent)  Supine to Sit : 5 (Supervision)  Sit to Supine : 5 (Supervision)       TRANSFERS Daily Assessment   Practiced sit<>stand from mat elevated to 30 inches simulating height of patient's bed at home. Able to complete with 6 inch step and SBA with cues using RW. . Cues for spinal precautions and body mechanics Transfer Type: SPT with walker  Transfer Assistance : 5 (Stand-by assistance)  Sit to Stand Assistance: Supervision  Car Transfers: Not tested       GAIT Daily Assessment    Amount of Assistance: 5 (Stand-by assistance)  Distance (ft): 175 Feet (ft)  Assistive Device: [address], rolling; Other (comment) (ace wrap RLE and Ossur foot up device LLE for DF assist)   Gait training with cues for improved ankle DF at initial contact and mid swing and improved hip stability at mid stance    STEPS or STAIRS Daily Assessment    NA       BALANCE Daily Assessment    Sitting - Static: Good (unsupported)  Sitting - Dynamic: Good (unsupported)  Standing - Static: Fair  Standing - Dynamic : Impaired       WHEELCHAIR MOBILITY Daily Assessment    Curbs/Ramps Assist Required (FIM Score): 0 (Not tested)  Wheelchair Setup Assist Required : 0 (Not tested)       LOWER EXTREMITY EXERCISES Daily Assessment    Extremity: Both  Exercise Type #1: Supine lower extremity strengthening  Sets Performed: 1  Reps Performed: 30  Level of Assist: Supervision (set up assist with verbal cues)  Exercise Type #2: Other (comment) (Ankle DF with ESTIM to anterior tib, premoduldated mode)  Sets Performed:  (x 6 mins each LE, 5 sec on 5 sec off)  Reps Performed:  (Intensity at 72 to 31 erlin amps, freq 80 to 150HZ)  Level of Assist: Minimal assistance          Assessment: Improved body mechanics with bed mobility. Good initial response with right ant tib to ESTIM. Mild erythema where electrodes were placed on anterior tibs, R> L       Patient return to room at end of treatment and remained up in wheelchair with needs in reach. Plan of Care: Continue with POC and progress as tolerated.      Perla Duncan, PT  1/2/2018

## 2018-01-02 NOTE — PROGRESS NOTES
Pt on mat in gym working with [employer], [address]. Denies needs or concerns at this time. Alert and oriented x4. Lungs sounds clear bilaterally with respirations even and unlabored. Bowel sounds active in all quadrants. Palpable pulses bilaterally in upper and lower extremities. States she has feeling in lower extremities bilaterally but that it is dull.

## 2018-01-02 NOTE — PROGRESS NOTES
Problem: Falls - Risk of  Goal: *Absence of Falls  Document Judith Fall Risk and appropriate interventions in the flowsheet.    Outcome: Progressing Towards Goal  Fall Risk Interventions:  Mobility Interventions: Bed/chair exit alarm, Communicate number of staff needed for ambulation/transfer, Patient to call before getting OOB, Strengthening exercises (ROM-active/passive), Utilize walker, cane, or other assitive device, PT Consult for assist device competence    Mentation Interventions: Bed/chair exit alarm, Evaluate medications/consider consulting pharmacy, Increase mobility, More frequent rounding, Toileting rounds    Medication Interventions: Bed/chair exit alarm, Evaluate medications/consider consulting pharmacy, Teach patient to arise slowly, Patient to call before getting OOB    Elimination Interventions: Bed/chair exit alarm, Call light in reach, Patient to call for help with toileting needs, Toileting schedule/hourly rounds    History of Falls Interventions: Consult care management for discharge planning, Evaluate medications/consider consulting pharmacy

## 2018-01-02 NOTE — PROGRESS NOTES
End Of Shift Functional Summary, Nursing      TOILETING:  Does patient need assist with clothing management and/or pericare? No    TOILET TRANSFER:  Pt requires standby assistance/setup. Pt uses walker. does not  BLADDER:  Pt does not have a muniz catheter that staff manages. Pt does take medication. Pt is continent. of bladder and voids in toilet  Pt requires staff to position device Pt has had 0 bladder accidents during this shift requiring standby assistance/setup to clean up. (An accident is when the episode is not contained in a brief AND/OR the clothing/linen requires changing/cleaning up.)    BOWEL:  Pt does take medication. Pt is continent of bowel and uses toilet. Pt requires staff to position device    Pt has had 0 bowel accidents during this shift requiring standby assistance/setup from staff to clean up.  (An accident is when the episode is not contained in a brief AND/OR the clothing/linen requires changing/cleaning up.)

## 2018-01-02 NOTE — PROGRESS NOTES
Pt awake, alert and oriented x 4. C/o pain and was given Narco 10 mg /325 mg po for lower back pain. Pt up and in wheelchair. Voices no other needs at this time. Pt encourage to call for any needs.

## 2018-01-02 NOTE — PROGRESS NOTES
Rhett Gallego MD,   Medical Director  [address] [facility], [address] [facility]  Tel: [phone]       S[facility] PROGRESS NOTE    Jazmine Hernandez Lax Date: 12/29/2017  Admit Diagnosis: spinal stenosis; Incomplete paraplegia (HCC)    Subjective     Voiding well. no residuals. + continent bowel. Pain controlled. Doesn't want to have to wear AFOs. A bit down today    Objective:     Current Facility-Administered Medications   Medication Dose Route Frequency    acetaminophen (TYLENOL) tablet 650 mg  650 mg Oral Q4H PRN    famotidine (PEPCID) tablet 20 mg  20 mg Oral Q12H    HYDROcodone-acetaminophen (NORCO)  mg tablet 1 Tab  1 Tab Oral Q4H PRN    senna-docusate (PERICOLACE) 8.6-50 mg per tablet 2 Tab  2 Tab Oral DAILY    heparin (porcine) injection 5,000 Units  5,000 Units SubCUTAneous Q12H    alum-mag hydroxide-simeth (MYLANTA) oral suspension 30 mL  30 mL Oral Q4H PRN    bisacodyl (DULCOLAX) suppository 10 mg  10 mg Rectal DAILY PRN    DULoxetine (CYMBALTA) capsule 60 mg  60 mg Oral QHS    lactulose (CHRONULAC) solution 30 g  30 g Oral PRN    magnesium hydroxide (MILK OF MAGNESIA) 400 mg/5 mL oral suspension 30 mL  30 mL Oral DAILY PRN    ondansetron (ZOFRAN ODT) tablet 8 mg  8 mg Oral Q6H PRN    polyethylene glycol (MIRALAX) packet 17 g  17 g Oral DAILY    simethicone (MYLICON) tablet 80 mg  80 mg Oral QID PRN    sodium phosphate (FLEET'S) enema 118 mL  1 Enema Rectal PRN    tamsulosin (FLOMAX) capsule 0.4 mg  0.4 mg Oral DAILY    temazepam (RESTORIL) capsule 30 mg  30 mg Oral QHS PRN    traMADol (ULTRAM) tablet 50 mg  50 mg Oral Q6H PRN    fluconazole (DIFLUCAN) tablet 200 mg  200 mg Oral DAILY     Review of Systems:Denies chest pain, shortness of breath, cough, headache, visual problems, abdominal pain, dysurea, calf pain. Pertinent positives are as noted in the medical records and unremarkable otherwise.      Visit Vitals    /77    Pulse 92    Temp 98.6 °F (37 °C)    Resp 14    SpO2 97%        Physical Exam:   General: Alert and age appropriately oriented. No acute cardio respiratory distress. HEENT: Normocephalic,no scleral icterus  Oral mucosa moist without cyanosis   Lungs: Clear to auscultation  bilaterally. Respiration even and unlabored   Heart: Regular rate and rhythm, S1, S2   No  murmurs, clicks, rub or gallops   Abdomen: Soft, non-tender, nondistended. Bowel sounds present. No organomegaly. Genitourinary: Benign . Neuromuscular:      RIGHT DF 3, left DF 1+  Right PF 4, left PF 3+  Adduction 5- ,    Skin/extremity: No rashes, no erythema.  No calf tenderness BLE  Wound healing, no edema                                                                            Functional Assessment:          Balance  Sitting - Static: Good (unsupported) (01/01/18 1500)  Sitting - Dynamic: Good (unsupported) (01/01/18 1500)  Standing - Static: Fair (01/01/18 1500)  Standing - Dynamic : Impaired (01/01/18 1500)                     Formerly named [facility] Fall Risk Assessment:  Formerly named [facility] Fall Risk  Mobility: Ambulates or transfers with assist devices or assistance/unsteady gait (01/02/18 0015)  Mobility Interventions: Communicate number of staff needed for ambulation/transfer (01/02/18 0015)  Mentation: Alert, oriented x 3 (01/02/18 0015)  Medication: Patient receiving anticonvulsants, sedatives(tranquilizers), psychotropics or hypnotics, hypoglycemics, narcotics, sleep aids, antihypertensives, laxatives, or diuretics (01/02/18 0015)  Medication Interventions: Patient to call before getting OOB (01/02/18 0015)  Elimination: Needs assistance with toileting (01/02/18 0015)  Elimination Interventions: Call light in reach (01/02/18 0015)  Prior Fall History: No (01/02/18 0015)  Total Score: 3 (01/02/18 0015)  Standard Fall Precautions: Yes (12/30/17 2040)  High Fall Risk: Yes (01/02/18 0015)     Speech Assessment:         Ambulation:  Gait  Distance (ft): 150 Feet (ft) (01/01/18 1500)  Assistive Device: Walker, rolling; Other (comment) (ace wrap RLE, Ossur Foot up device RLE for DF assist) (01/01/18 1500)  Rail Use: Both (01/01/18 1200)     Labs/Studies:  No results found for this or any previous visit (from the past 67 hour(s)). Assessment:     Problem List as of 1/2/2018  Date Reviewed: 12/23/2017          Codes Class Noted - Resolved    Incomplete paraplegia [facility]) ICD-10-CM: G82.22  ICD-9-CM: 344.1  12/29/2017 - Present        Cauda equina syndrome with neurogenic bladder [facility]) ICD-10-CM: G83.4  ICD-9-CM: 344.61  12/23/2017 - Present          Assessment:L4-5 disc herniation with extrusion with severe narrowing and compression. s/p left L4-5 partial hemilaminectomy, discectomy, and foraminotomy for decompression of nerves due to acute cauda equina syndrome. This was performed on 12/23,     Continue daily physician medical management:  Pneumonia prophylaxis- Incentive spirometer every hour while awake      DVT risk / DVT Prophylaxis- Will require daily physician exam to assess for signs and symptoms as patient is at increased risk for of thromboembolism. Mobilization as tolerated. Intermittent pneumatic compression devices when in bed Thigh-high or knee-high thromboembolic deterrent hose when out of bed. -  started, continued on Heparin 5000 bid      Pain Control: stable, mild-to-moderate joint symptoms intermittently, reasonably well controlled by PRN meds. Will require regular pain assessment and comprenhensive pain management,   - no new pain source. Needing few prn norco /day for surgical back pain.        Wound Care: Monitor wound status daily per staff and physician. At risk for failure. Will require 24/7 rehab nursing. Keep wound clean and dry      Hypotension; teds prn;   - 1/1 - asymptomatic. Monitor orthostatics.   SBP 100s-130s.      Hx of depression; cont Cymbalta; has been coping very well      Bilateral foot drop; neuro deficits appear to be mostly at L4., L5 roots. - will need maria esther AFOs,.cont. DF assist for PT.        Urinary retention/ neurogenic bladder -  -PVRs minimal to none. Voiding spontaneously. No accidents. Will PVR daily. Monitor.   - UA -neg.   - added Flomax at admission. Voiding spontaneously, pvrs minimal.         candidiasis. -  Diflucan. treat for 3d oral diflucan     bowel program - cont Miralax, pericolace, daily suppos/ prn lactulose, fleets. Had mod BM yesterday and today. Denies soiling.  - 1/1 continent of bowel. Feels urge.       GERD - resume PPI. At times may need additional antacids, Maalox prn.          Time spent was 25 minutes with over 1/2 in direct patient care/examination, consultation and coordination of care.      Signed By: Peggy Leavitt MD     January 2, 2018

## 2018-01-02 NOTE — PROGRESS NOTES
Subjective \"I want to get back my independence. \"   Activity Evaluation   Strength/Endurance Patient tolerated the session with no c/o tiredness or fatigue. Patient commented she \"wanted to get her legs working again. \"   Balance Sit<->stand:  S with RW   Social Interaction Patient was friendly and in good spirits. Cognitive A&O X4   Comments Patient appears motivated and appears receptive to participate with recreational therapy. Patient's Recreational Therapy evaluation completed under the [facility] navigation tool on 1/2/17. Please see for specifics regarding plan of care and goals. Patient prefers to be called \"Jazmine. \" Thank you for the referral.  Neymar Tirado, EMMETTS

## 2018-01-02 NOTE — PROGRESS NOTES
PHYSICAL THERAPY DAILY NOTE  Time In: 1030  Time Out: 1115  Patient Seen For: AM;Balance activities;Gait training;Patient education; Therapeutic exercise;Transfer training; Other (see progress notes)    Subjective: patient reporting she feels OK today. Reports no increase in back pain during treatment. Reports she can feel her hip muscles working. Reports she wants to have as small of a foot brace as possible         Objective:Vital Signs:  Patient Vitals for the past 12 hrs:   Temp Pulse Resp BP SpO2   01/02/18 0730 98.6 °F (37 °C) 92 14 114/77 97 %     Pain level:0 to 4 out of 10  Pain location:low back  Pain interventions:Pain medication, rest, positioning,body mechanics    Patient education:Balance training,transfer training, gait training, fall precautions, activity pacing, spinal precautions, w/c mobility and parts management, body mechanics, Patient verbalizing understanding and demonstrating partial understanding of patient education. Recommend follow up education. Interdisciplinary Communication:Orthotist in from [facility] to assess patient for trial AFOs. Orthotist to bring in trial AFOs. Patient agreed with plan    Spinal  GROSS ASSESSMENT Daily Assessment     NA       BED/MAT MOBILITY Daily Assessment    Rolling Right : 0 (Not tested)  Rolling Left : 0 (Not tested)  Supine to Sit : 0 (Not tested)  Sit to Supine : 0 (Not tested)       TRANSFERS Daily Assessment   Verbal cues for body mechanics Transfer Type: SPT with walker  Transfer Assistance : 5 (Stand-by assistance)  Sit to Stand Assistance: Supervision  Car Transfers: Not tested       GAIT Daily Assessment   Gait training x 40ft x 2 in parallel bars facilitating symmetrical step length with improved ankle DF at initial contact and improved hip stability during stance with verbal and visual cues Amount of Assistance: 5 (Stand-by assistance)  Distance (ft): 150 Feet (ft) (150ft x 2)  Assistive Device: Walker, rolling; Other (comment) (ace wrap RLE and Ossur foot up device LLE for DF assist)   Gait training with cues for improved hip stability during stance phase and improved ankle DF at initial contact    STEPS or STAIRS Daily Assessment   Slow single step at a time going up/down steps with increased time and effort to complete, cues for posture Steps/Stairs Ambulated (#): 8  Level of Assist : 5 (Stand-by assistance)  Rail Use: Both       BALANCE Daily Assessment    Sitting - Static: Good (unsupported)  Sitting - Dynamic: Good (unsupported)  Standing - Static: Fair  Standing - Dynamic : Impaired       WHEELCHAIR MOBILITY Daily Assessment    Curbs/Ramps Assist Required (FIM Score): 0 (Not tested)  Wheelchair Setup Assist Required : 0 (Not tested)       LOWER EXTREMITY EXERCISES Daily Assessment    Extremity: Both  Exercise Type #1: Other (comment) (Single limb stance stability with single UE support)  Sets Performed: 2  Reps Performed: 10  Level of Assist: Supervision (with UE support on parallel bars)          Assessment: Progressing towards goals. Slowly improving with ability to stabilize hips during stance phase. Progressing towards modified independence with gait, transfers and bed mobility       Patient to OT at end of treatment    Plan of Care: Continue with POC and progress as tolerated.      Mony Mir, PT  1/2/2018

## 2018-01-02 NOTE — PROGRESS NOTES
OT Daily Note  Time In 1115   Time Out 1200     Pain: Patient had no complaint of pain. Functional Mobility   Pt was S with transfers. Pt toileted with S.      Strengthening   Pt completed 5 minutes on the ergometer frontwards and backwards with heavy resistance to increase UB strength and activity tolerance for integration into functional mobility. Pt did not require a rest break      Pt completed the following exercises with a 3 lb dumbbell B to promote UB strength, activity tolerance, and shoulder stabilization for integration into simple IADL:  Exercise Sets Reps   Deltoid raise 1 10   Anterior Punch 1 15   Anterior Alphabet 1    Pt demonstrated good form and tolerance. Balance   Engaged in standing. Pt stood 3x for approximately 4-5 minutes each time with no LOB. Interdisciplinary Communication: PT Belinda Frances on d/c    Plan: Continue with POC. Pt was left in w/c with all needs within reach.      Helen Thomas OTR/L  1/2/2018

## 2018-01-02 NOTE — PROGRESS NOTES
End Of Shift Functional Summary, Nursing      TOILETING:  Does patient need assist with clothing management and/or pericare? No    TOILET TRANSFER:  Pt requires standby assistance/setup. Pt uses walker. BLADDER:  Pt does not have a muniz catheter that staff manages. Pt does not take medication. Pt is continent. of bladder and voids in toilet  Pt has had 0 bladder accidents during this shift.  (An accident is when the episode is not contained in a brief AND/OR the clothing/linen requires changing/cleaning up.)    BOWEL:  Pt does take medication. Pt is continent of bowel and uses toilet. Pt has had 0 bowel accidents during this shift. (An accident is when the episode is not contained in a brief AND/OR the clothing/linen requires changing/cleaning up.)    BED/CHAIR TRANSFER  Pt requires standby assistance/setup. Patient requires the assistance of 1 staff member(s). Pt uses walker    EATING  Pt requires no assistance. Pt does not wear dentures. TUBE FEEDINGS:  Pt does not  receive nutrition through tube feedings. Patient requires no assistance with feedings. Documentation reviewed and plan of care discussed/reviewed with   oncoming nurse and patient assistant during the shift.

## 2018-01-02 NOTE — PROGRESS NOTES
Time In 0913   Time Out 0957     Mobility   Score Comments   Transfer Assist 5 Transfer Type: SPT with walker  Comments: SBA     Activities of Daily Living    Score Comments   Grooming 7 Tasks completed by patient: Brushed teeth  Comments: I   Bathing 5 Body Parts Bathe: Abdomen, Arm, left, Arm, right, Buttocks, Chest, Lower leg and foot, left, Lower leg and foot, right, Sharyn area, Thigh, left, Thigh, right  Comments: S   Tub/Shower Transfer [place]lin 5 Type of Shower: Shower  Adaptive  Equipment:Tub transfer bench, Grab bars and Wheelchair  Comments: SBA   Upper Body  Dressing/  Undressing 7 Items Applied:Pullover (4 steps), Bra (3 steps)  Comments: I   Lower Body Dressing/  Undressing 5 Items Applied:Shoe, left (1 step), Shoe, right (1 step), Sock, left (1 step), Sock, right (1 step), Underpants (3 steps), Elastic waist pants (3 steps), Fasten shoe (1 step)  Adaptive Equipment: Grab bar  Comments: S   Education  Reacher use     Pt was in w/c and agreeable to tx. Pt's performance with ADL is reflected in above chart. Pt was brought out to the gym and engaged in standing activity. Pt stood for approximately 7 minutes without issue. Pt was taught to manage toe-off. Pt was left in room with all needs within reach. Continue with POC.      Maira Siu OTR/L  1/2/2018

## 2018-01-03 PROCEDURE — 97116 GAIT TRAINING THERAPY: CPT

## 2018-01-03 PROCEDURE — 74011250637 HC RX REV CODE- 250/637: Performed by: PHYSICAL MEDICINE & REHABILITATION

## 2018-01-03 PROCEDURE — 74011250636 HC RX REV CODE- 250/636: Performed by: PHYSICAL MEDICINE & REHABILITATION

## 2018-01-03 PROCEDURE — 97110 THERAPEUTIC EXERCISES: CPT

## 2018-01-03 PROCEDURE — 97535 SELF CARE MNGMENT TRAINING: CPT

## 2018-01-03 PROCEDURE — 97530 THERAPEUTIC ACTIVITIES: CPT

## 2018-01-03 PROCEDURE — 65310000000 HC RM PRIVATE REHAB

## 2018-01-03 RX ADMIN — HEPARIN SODIUM 5000 UNITS: 5000 INJECTION, SOLUTION INTRAVENOUS; SUBCUTANEOUS at 10:02

## 2018-01-03 RX ADMIN — FAMOTIDINE 20 MG: 20 TABLET, FILM COATED ORAL at 08:39

## 2018-01-03 RX ADMIN — FLUCONAZOLE 200 MG: 100 TABLET ORAL at 08:39

## 2018-01-03 RX ADMIN — HYDROCODONE BITARTRATE AND ACETAMINOPHEN 1 TABLET: 10; 325 TABLET ORAL at 22:06

## 2018-01-03 RX ADMIN — TAMSULOSIN HYDROCHLORIDE 0.4 MG: 0.4 CAPSULE ORAL at 08:39

## 2018-01-03 RX ADMIN — HEPARIN SODIUM 5000 UNITS: 5000 INJECTION, SOLUTION INTRAVENOUS; SUBCUTANEOUS at 21:59

## 2018-01-03 RX ADMIN — DULOXETINE HYDROCHLORIDE 60 MG: 30 CAPSULE, DELAYED RELEASE ORAL at 22:00

## 2018-01-03 RX ADMIN — HYDROCODONE BITARTRATE AND ACETAMINOPHEN 1 TABLET: 10; 325 TABLET ORAL at 07:51

## 2018-01-03 RX ADMIN — HYDROCODONE BITARTRATE AND ACETAMINOPHEN 1 TABLET: 10; 325 TABLET ORAL at 12:09

## 2018-01-03 RX ADMIN — STANDARDIZED SENNA CONCENTRATE AND DOCUSATE SODIUM 2 TABLET: 8.6; 5 TABLET, FILM COATED ORAL at 08:39

## 2018-01-03 RX ADMIN — FAMOTIDINE 20 MG: 20 TABLET, FILM COATED ORAL at 22:00

## 2018-01-03 RX ADMIN — POLYETHYLENE GLYCOL (3350) 17 G: 17 POWDER, FOR SOLUTION ORAL at 08:40

## 2018-01-03 NOTE — PROGRESS NOTES
Message received from [facility] ([place], [place]) with [address]. Pt approved for 5 additional days (1/3 to 1/7) with next update due on 1/8. Auth number [id number]. Call back number is [phone]   Fax [phone]. Notified patient of extension. WC and rolling walker ordered from [employer].

## 2018-01-03 NOTE — PROGRESS NOTES
[facility] PROGRESS NOTE    Jazmine Basilio  Admit Date: 12/29/2017    Chief Complaint : Gait dysfunction secondary to below. Admit Diagnosis: spinal stenosis; Incomplete paraplegia (HCC)  Neurogenic bowel and bladder  Acute Rehab Dx:  Gait impairment/ gait dysfunction  Debility    deconditioning  Mobility and ambulation deficits  Self Care/ADL deficits     Subjective     Patient seen and examined. Vss. Afebrile. Voiding spontaneously. BMs still difficult but no accidents. Feels some urge. Cold feet noted. discused it is due to SCI. explianed to keep warm, keep socks to assist in regulating temperature distal to injury.      Objective:     Current Facility-Administered Medications   Medication Dose Route Frequency    acetaminophen (TYLENOL) tablet 650 mg  650 mg Oral Q4H PRN    famotidine (PEPCID) tablet 20 mg  20 mg Oral Q12H    HYDROcodone-acetaminophen (NORCO)  mg tablet 1 Tab  1 Tab Oral Q4H PRN    senna-docusate (PERICOLACE) 8.6-50 mg per tablet 2 Tab  2 Tab Oral DAILY    heparin (porcine) injection 5,000 Units  5,000 Units SubCUTAneous Q12H    alum-mag hydroxide-simeth (MYLANTA) oral suspension 30 mL  30 mL Oral Q4H PRN    bisacodyl (DULCOLAX) suppository 10 mg  10 mg Rectal DAILY PRN    DULoxetine (CYMBALTA) capsule 60 mg  60 mg Oral QHS    lactulose (CHRONULAC) solution 30 g  30 g Oral PRN    magnesium hydroxide (MILK OF MAGNESIA) 400 mg/5 mL oral suspension 30 mL  30 mL Oral DAILY PRN    ondansetron (ZOFRAN ODT) tablet 8 mg  8 mg Oral Q6H PRN    polyethylene glycol (MIRALAX) packet 17 g  17 g Oral DAILY    simethicone (MYLICON) tablet 80 mg  80 mg Oral QID PRN    sodium phosphate (FLEET'S) enema 118 mL  1 Enema Rectal PRN    tamsulosin (FLOMAX) capsule 0.4 mg  0.4 mg Oral DAILY    temazepam (RESTORIL) capsule 30 mg  30 mg Oral QHS PRN    traMADol (ULTRAM) tablet 50 mg  50 mg Oral Q6H PRN    fluconazole (DIFLUCAN) tablet 200 mg  200 mg Oral DAILY     Review of Systems  :Denies chest pain, shortness of breath, cough, headache, visual problems, abdominal pain, dysurea, calf pain. Pertinent positives are as noted in the medical records and unremarkable otherwise. Physical Exam:   General: Alert and age appropriately oriented. No acute cardio respiratory distress. HEENT: Normocephalic,   no scleral icterus  Oral mucosa moist without cyanosis   Lungs: Clear to auscultation  bilaterally. Respiration even and unlabored   Heart: Regular rate and rhythm, S1, S2   No  murmurs, clicks, rub or gallops   Abdomen: Soft, non-tender, nondistended. Bowel sounds present. No organomegaly. Genitourinary: defered   Neuromuscular:         LLE     Hip flexion:   5/5              Knee extension:  5 /5    Ankle dorsiflexion:   1+/5   EHL:   0/5   Ankle plantarflexion:   5-/5        RLE     Hip flexion:  5- /5   Knee extension:   5/5    Ankle dorsiflexion:  2- /5   EHL:   0/5   Ankle plantarflexion:   5-/5  Sensory - + areas of LLE, RLE sensory deificts      Skin/extremity: No rashes, no erythema. No calf tenderness BLE  Wound covered.  Back incision C/D/I. cold LE, feet                                                                            Functional Assessment:          Balance  Sitting - Static: Good (unsupported) (01/02/18 1500)  Sitting - Dynamic: Good (unsupported) (01/02/18 1500)  Standing - Static: Fair (01/02/18 1500)  Standing - Dynamic : Impaired (01/02/18 1500)           Toileting  Adaptive Equipment: Elevated seat;Grab bars (01/03/18 0957)         Tania Dee Fall Risk Assessment:  Tania Dee Fall Risk  Mobility: Ambulates or transfers with assist devices or assistance/unsteady gait (01/03/18 0035)  Mobility Interventions: Bed/chair exit alarm (01/03/18 0035)  Mentation: Alert, oriented x 3 (01/03/18 0035)  Mentation Interventions: Bed/chair exit alarm (01/03/18 0035)  Medication: Patient receiving anticonvulsants, sedatives(tranquilizers), psychotropics or hypnotics, hypoglycemics, narcotics, sleep aids, antihypertensives, laxatives, or diuretics (01/03/18 0035)  Medication Interventions: Bed/chair exit alarm (01/03/18 0035)  Elimination: Needs assistance with toileting (01/03/18 0035)  Elimination Interventions: Bed/chair exit alarm (01/03/18 0035)  Prior Fall History: No (01/03/18 0035)  History of Falls Interventions: Consult care management for discharge planning (01/03/18 0035)  Total Score: 3 (01/03/18 0035)  Standard Fall Precautions: Yes (12/30/17 2040)  High Fall Risk: Yes (01/03/18 0035)     Speech Assessment:         Ambulation:  Gait  Distance (ft): 150 Feet (ft) (01/03/18 1020)  Assistive Device: Walker, rolling;Brace/Splint (with toe up dept brace on left and ace wrap to right to assist DF) (01/03/18 1020)  Rail Use: Both (01/02/18 1100)     Labs/Studies:  No results found for this or any previous visit (from the past 72 hour(s)). Assessment:     Problem List as of 1/3/2018  Date Reviewed: 12/23/2017          Codes Class Noted - Resolved    Incomplete paraplegia [facility]) ICD-10-CM: G82.22  ICD-9-CM: 344.1  12/29/2017 - Present        Cauda equina syndrome with neurogenic bladder [facility]) ICD-10-CM: G83.4  ICD-9-CM: 344.61  12/23/2017 - Present              Assessment/ plan:         Assessment:L4-5 disc herniation with extrusion with severe narrowing and compression. s/p left L4-5 partial hemilaminectomy, discectomy, and foraminotomy for decompression of nerves due to acute cauda equina syndrome. This was performed on 12/23,      The Post Assessment Physician Evaluation (AMARILIS) found the current functional status to be comparable with the Pre-admission Screening. The Patient is a good candidate for acute inpatient rehabilitation.  Nothing since the Pre-admission screen has changed that determination.      Rehabilitation Plan  The patient has shown the ability to tolerate and benefit from 3 hours of therapy daily and is being admitted to a comprehensive acute inpatient rehabilitation program consisting of at least 3 hours of combined physical and occupational therapies. Begin intensive Physical Therapy for a minimum of 1.5 hours a day, at least 5 out of 7 days per week to address bed mobility, transfers, ambulation, strengthening, balance, and endurance. Focus on LE strength funciton. Begin intensive Occupational Therapy for a minimum of 1.5 hours a day, at least 5 out of 7 days per week to address ADL ( bathing, LE dressing, toileting) and adaptive equipment as needed.     The patient will also require 24-hour skilled rehabilitation nursing for bowel and bladder management, skin care for decubitus ulcer prevention , pain management and ongoing medication administration      The patient may benefit from a psychology consult for depression, adjustment disorder.         Continue daily physician medical management:  Pneumonia prophylaxis- Incentive spirometer every hour while awake     DVT risk / DVT Prophylaxis- Will require daily physician exam to assess for signs and symptoms as patient is at increased risk for of thromboembolism. Mobilization as tolerated. Intermittent pneumatic compression devices when in bed Thigh-high or knee-high thromboembolic deterrent hose when out of bed. -  started, continued on Heparin 5000 bid     Pain Control: stable, mild-to-moderate joint symptoms intermittently, reasonably well controlled by PRN meds. Will require regular pain assessment and comprenhensive pain management,   - no new pain source. Needing few prn norco /day for surgical back pain.       Wound Care: Monitor wound status daily per staff and physician. At risk for failure. Will require 24/7 rehab nursing. Keep wound clean and dry  - keep feet/leg warm. Socks.      Hypotension; teds prn;   - 1/3 - Monitor orthostatics. SBP 100s-110s. No orthostatic symptoms.    Hx of depression; cont Cymbalta; has been coping very well     Bilateral foot drop; neuro deficits appear to be mostly at L4., L5 roots.    - will need maria esther AFOs,.cont. DF assist for PT.       Urinary retention/ neurogenic bladder -  -PVRs minimal to none. Voiding spontaneously. No accidents. Will PVR daily. Monitor.   - UA -neg.   - added Flomax at admission. - 1/3 Voiding spontaneously, pvrs minimal.       candidiasis. -  Treated x 6 days. Diflucan. treat for 3d oral diflucan. Will d/c.     bowel program - cont Miralax, pericolace, daily suppos/ prn lactulose, fleets. Had mod BM yesterday and today. Denies soiling.  - 1/3  continent of bowel. No accidents. + BMs. Feels urge.      GERD - resume PPI. At times may need additional antacids, Maalox prn. Time spent was 25 minutes with over 1/2 in direct patient care/examination, consultation and coordination of care.      Signed By: Chela Schwab MD     January 3, 2018

## 2018-01-03 NOTE — PROGRESS NOTES
Subjective \"Let's play the Wii! \"   Activity Wii bowling   Strength/Endurance Patient tolerated the session without c/o tiredness or fatigue. She commented that she would be eager to lay down at the end of the session due to being tired. Balance NT   Social Interaction Patient was in good spirits and sociable with the young patient she was paired with. They appeared to enjoy the interaction and conversation sharing about their recovery. Cognitive A&O X4   Comments Patient propelled her w/c to her room on her on.        MARY ANN Samuel

## 2018-01-03 NOTE — PROGRESS NOTES
OT Daily Note  Time In 1115   Time Out 1201     Subjective: \"Thank you for showing me that. That was really helpful. \"   Pain: none reported  Education: tub transfer bench transfer techniques; strategy of cutting inner shower liner to accommodate bench; spinal precautions   Interdisciplinary Communication: nurse aware that patient out/back from therapy   Precautions: Spinal, Other (comment) (falls)  Location on arrival: up in [place] [id number] in room     Activity Tolerance Daily Assessment   Patient completed static standing tolerance task standing with constant hip support at table x 22:09 with no seated rest breaks while engaging in BUE coordination task completing 50 piece complex puzzle. No LOB noted. Steadily improving with standing tolerance and duration. Self-Care Daily Assessment   Patient completed dry tub transfer bench transfer using RW with supervision after demonstration. Discussed techniques to complete while preserving spinal precautions with good understanding. Educated patient on techniques to modify tub transfer bench to accommodate the direction of her shower, cutting inner liner to prevent water spillage on floor, and use of hand held shower head. Patient appeared to understand all education. WC mobility Daily Assessment   Patient propelled self from training bathroom to room x approximately 200' with supervision. Patient was left seated up in [facility] in room with call light/all needs in reach and in no distress. Assessment: Making great progress. Plan: Continue OT POC with focus on ADL/IADL skills, functional transfers, functional mobility, coordination, strength, static and dynamic balance, and activity tolerance to maximize safety and independence with ADLs and functional transfers.      Darrian Montesinos MS, OTR/L  1/3/2018

## 2018-01-03 NOTE — PROGRESS NOTES
Pt sitting in wheelchair in room talking on phone. Denies any needs or concerns at this time. Visitor in room.

## 2018-01-03 NOTE — PROGRESS NOTES
01/03/18 0957   Time Spent With Patient   Time In 0831   Time Out 0910   Patient Seen For: AM;ADLs   Feeding/Eating   Feeding/Eating Assistance I   Grooming   Grooming Assistance  Mod I   Comments seated in WC    Upper Body Bathing   Bathing Assistance, Upper Mod I   Position Performed Other (comment)  (seated on tub transfer bench )   Lower Body Bathing   Bathing Assistance, Lower  S   Position Performed Standing; Other (comment)  (seated on tub transfer bench)   Adaptive Equipment Tub bench   Comments verbal cue for spinal precautions    Toileting   Toileting Assistance (FIM Score) S   Adaptive Equipment Elevated seat;Grab bars   Upper Body Dressing    Dressing Assistance  S   Comments setup assist    Lower Body Dressing    Dressing Assistance  S   Position Performed Seated in chair;Standing   Adaptive Equipment Used Grab bar;Reacher   Comments assist donning toe-up brace L ankle    Functional Transfers   Toilet Transfer  Elevated seat;Grab bars;Stand pivot transfer without device   Amount of Assistance Required SBA   Tub or Shower Type Shower   Amount of Assistance Required SBA   Adaptive Equipment Tub transfer bench; Wheelchair;Grab bars   S: \"I'm doing well and ready to go! \"   O: Patient presented seated up in [facility], gathering clothing. Agreeable to treatment. Patient completed ADL; see above for FIMs. A: Patient is making good progress. Remains limited by impaired dorsiflexion BLE. Patient tolerated session well with no complaint of pain. P: Continue OT POC with focus on ADL/IADL skills, functional transfers, functional mobility, coordination, strength, static and dynamic balance, and activity tolerance to maximize safety and independence with ADLs and functional transfers. Patient was left seated up in [facility] in room with call bell/all other needs in reach. Interdisciplinary communication: Collaborated with PT and confirmed that patient is on track to meet goals.      Jonnie Knox MS, OTR/L  1/3/2018

## 2018-01-03 NOTE — PROGRESS NOTES
End Of Shift Functional Summary, Nursing      TOILETING:  Does patient need assist with clothing management and/or pericare? No    TOILET TRANSFER:  Pt requires stand byl assistance. Pt uses wheelchair. BLADDER:  Pt does not have a muniz catheter that staff manages. Pt does take medication. Pt is continent. of bladder and voids in toilet  Pt requires staff  Pt has had 0 bladder accidents during this shift .  (An accident is when the episode is not contained in a brief AND/OR the clothing/linen requires changing/cleaning up.)    BOWEL:  Pt does take medication. Pt is continent of bowel and uses toilet. Pt has had 0 bowel accidents during this shift (An accident is when the episode is not contained in a brief AND/OR the clothing/linen requires changing/cleaning up.)    BED/CHAIR TRANSFER  Pt requires standby assistance/setup. Patient requires the assistance of 1 staff member(s). Pt uses wheelchair    Documentation reviewed and plan of care discussed/reviewed with   patient during the shift.

## 2018-01-03 NOTE — PROGRESS NOTES
PHYSICAL THERAPY DAILY NOTE  Time In: 9023  Time Out: 1004  Patient Seen For: AM;Other (see progress notes);Gait training; Therapeutic exercise;Transfer training    Subjective: Patient had no complaints. Objective: No pain noted. Spinal  GROSS ASSESSMENT Daily Assessment            BED/MAT MOBILITY Daily Assessment    Supine to Sit : 5 (Supervision)  Sit to Supine : 5 (Supervision)       TRANSFERS Daily Assessment    Transfer Type: SPT with walker  Transfer Assistance : 5 (Stand-by assistance)  Sit to Stand Assistance: Supervision       GAIT Daily Assessment    Amount of Assistance: 5 (Stand-by assistance)  Distance (ft): 150 Feet (ft)  Assistive Device: Walker, rolling;Brace/Splint (with toe up dept brace on left and ace wrap to right to assist DF)       STEPS or STAIRS Daily Assessment    Level of Assist : 0 (Not tested)       BALANCE Daily Assessment            WHEELCHAIR MOBILITY Daily Assessment            LOWER EXTREMITY EXERCISES Daily Assessment    Extremity: Both  Exercise Type #1: Other (comment) (standing balance exs)  Sets Performed: 1  Reps Performed: 5  Level of Assist: Minimal assistance          Assessment: Patient making good progress toward PT goals. She is a little tearful about her situation. Plan of Care: Continue with plan of care to reach PT goals. Returned to room with call burleson at reach.     Peng [place], PTA  1/3/2018

## 2018-01-03 NOTE — PROGRESS NOTES
PHYSICAL THERAPY DAILY NOTE  Time In: 1300  Time Out: 9073  Patient Seen For: PM;Other (see progress notes);Gait training; Therapeutic exercise;Transfer training    Subjective: Patient had no complaints. Objective: No pain noted. Spinal  GROSS ASSESSMENT Daily Assessment            BED/MAT MOBILITY Daily Assessment    Supine to Sit : 5 (Supervision)  Sit to Supine : 5 (Supervision)       TRANSFERS Daily Assessment    Transfer Type: SPT with walker  Transfer Assistance : 5 (Stand-by assistance)  Sit to Stand Assistance: Supervision       GAIT Daily Assessment    Amount of Assistance: 5 (Stand-by assistance)  Distance (ft): 150 Feet (ft)  Assistive Device: Walker, rolling;Brace/Splint (dept toe up on left and ace wrap on right)       STEPS or STAIRS Daily Assessment    Level of Assist : 0 (Not tested)       BALANCE Daily Assessment            WHEELCHAIR MOBILITY Daily Assessment            LOWER EXTREMITY EXERCISES Daily Assessment   Worked in II bars with proprieception. Stomping feet and weight shifting stepping backwards. Extremity: Both  Exercise Type #1: Other (comment) (in II bars to work on blanace and weight shifting)  Sets Performed: 5  Reps Performed: 0  Level of Assist: Stand-by assistance          Assessment: Patient making good progress. She felt like stomping she felt her balance better. Plan of Care: Continue with plan of care to reach PT goals. Returned to room with call burleson at reach.     Megan Umaña, PTA  1/3/2018

## 2018-01-04 PROCEDURE — 97116 GAIT TRAINING THERAPY: CPT

## 2018-01-04 PROCEDURE — 97530 THERAPEUTIC ACTIVITIES: CPT

## 2018-01-04 PROCEDURE — 74011250636 HC RX REV CODE- 250/636: Performed by: PHYSICAL MEDICINE & REHABILITATION

## 2018-01-04 PROCEDURE — 97535 SELF CARE MNGMENT TRAINING: CPT

## 2018-01-04 PROCEDURE — 65310000000 HC RM PRIVATE REHAB

## 2018-01-04 PROCEDURE — 97110 THERAPEUTIC EXERCISES: CPT

## 2018-01-04 PROCEDURE — 97032 APPL MODALITY 1+ESTIM EA 15: CPT

## 2018-01-04 PROCEDURE — 74011250637 HC RX REV CODE- 250/637: Performed by: PHYSICAL MEDICINE & REHABILITATION

## 2018-01-04 RX ORDER — POLYETHYLENE GLYCOL 3350 17 G/17G
17 POWDER, FOR SOLUTION ORAL DAILY
Qty: 30 EACH | Refills: 2 | Status: SHIPPED | OUTPATIENT
Start: 2018-01-05

## 2018-01-04 RX ORDER — FACIAL-BODY WIPES
10 EACH TOPICAL
Qty: 30 SUPPOSITORY | Refills: 2 | Status: SHIPPED | OUTPATIENT
Start: 2018-01-04

## 2018-01-04 RX ORDER — HYDROCODONE BITARTRATE AND ACETAMINOPHEN 10; 325 MG/1; MG/1
1 TABLET ORAL
Qty: 120 TAB | Refills: 0 | Status: SHIPPED | OUTPATIENT
Start: 2018-01-04

## 2018-01-04 RX ADMIN — DULOXETINE HYDROCHLORIDE 60 MG: 30 CAPSULE, DELAYED RELEASE ORAL at 20:52

## 2018-01-04 RX ADMIN — HYDROCODONE BITARTRATE AND ACETAMINOPHEN 1 TABLET: 10; 325 TABLET ORAL at 20:52

## 2018-01-04 RX ADMIN — HYDROCODONE BITARTRATE AND ACETAMINOPHEN 1 TABLET: 10; 325 TABLET ORAL at 08:06

## 2018-01-04 RX ADMIN — HEPARIN SODIUM 5000 UNITS: 5000 INJECTION, SOLUTION INTRAVENOUS; SUBCUTANEOUS at 09:33

## 2018-01-04 RX ADMIN — POLYETHYLENE GLYCOL (3350) 17 G: 17 POWDER, FOR SOLUTION ORAL at 08:40

## 2018-01-04 RX ADMIN — TAMSULOSIN HYDROCHLORIDE 0.4 MG: 0.4 CAPSULE ORAL at 08:40

## 2018-01-04 RX ADMIN — FAMOTIDINE 20 MG: 20 TABLET, FILM COATED ORAL at 20:52

## 2018-01-04 RX ADMIN — FAMOTIDINE 20 MG: 20 TABLET, FILM COATED ORAL at 08:39

## 2018-01-04 RX ADMIN — STANDARDIZED SENNA CONCENTRATE AND DOCUSATE SODIUM 2 TABLET: 8.6; 5 TABLET, FILM COATED ORAL at 08:39

## 2018-01-04 RX ADMIN — HYDROCODONE BITARTRATE AND ACETAMINOPHEN 1 TABLET: 10; 325 TABLET ORAL at 12:39

## 2018-01-04 NOTE — PROGRESS NOTES
OT Daily Note  Time In 1016   Time Out 1115     Subjective: \"Thank you, that was really helpful. \" [regarding kitchen environmental/task modification suggestions]  Pain: none reported  Education: kitchen environmental/task modifications, spinal precautions   Interdisciplinary Communication: with PT regarding DC tomorrow   Precautions: Spinal, Other (comment) (falls)  Location on arrival: up in [place] [address]     IADL Daily Assessment   Patient completed simulated kitchen tasks at RW level with SBA to supervision, with verbal cues for technique. Provided multiple suggestions including but not limited to use of walker basket, raising objects to decrease risk of bending, use of non-skid material to place on top of basket to transport items, and foot placement to decrease twisting during standing and turning tasks. Educated patient in techniques for completing IADL tasks such as cooking with verbalized understanding. Good understanding of all education. Activity Tolerance Daily Assessment   Patient completed static standing tolerance task at elevated table with SBA, with constant one hand support, while engaging in cognitive and coordination task, Connect 4. Patient stood for a total of approximately 10 minutes with three seated rest breaks. Progressing well. Education Daily Assessment   Educated patient on driving rehab options and provided patient with written information regarding program at [place]. Educated patient to discuss with physician at discharge. Good understanding of all education. Patient was left seated up in [place] [address] for PT. Assessment: Progressing well. On track for discharge. Plan: Continue OT POC with focus on ADL/IADL skills, functional transfers, functional mobility, coordination, strength, static and dynamic balance, and activity tolerance to maximize safety and independence with ADLs and functional transfers.      Natalia Cano MS, OTR/L  1/4/2018

## 2018-01-04 NOTE — PROGRESS NOTES
TOILETING:  Does patient need assist with clothing management and/or pericare? No      TOILET TRANSFER:  Pt requires standby assistance/setup.  Pt uses walker. does not  BLADDER:  Pt does not have a muniz catheter that staff manages.  Pt does take medication.  Pt is continent. of bladder and voids in toilet  Pt requires staff to position device Pt has had 0 bladder accidents during this shift requiring standby assistance/setup to clean up.  (An accident is when the episode is not contained in a brief AND/OR the clothing/linen requires changing/cleaning up.)      BOWEL:  Pt does take medication.  Pt is continent of bowel and uses toilet.  Pt requires staff to position device    Pt has had 0 bowel accidents during this shift requiring standby assistance/setup from staff to clean up.  (An accident is when the episode is not contained in a brief AND/OR the clothing/linen requires changing/cleaning up.)

## 2018-01-04 NOTE — PROGRESS NOTES
Subjective \"I am so happy to be going home tomorrow! I miss my kids. \"   Activity UE exercises with 2 lb weighted bar   Strength/Endurance Patient was without c/o tiredness or fatigue. Balance Sit<->stand:  Mod (I) with RW   Social Interaction Patient was sociable and outgoing. Such a pleasure to converse with. Cognitive A&O X4   Comments Patient propelled her w/c to her room at the end of the session. Pt D/C summary completed on 1/4/18. Please see for specifics in [address] Patient expected to be d/c'd to home on 1/5/18.   Luiz Matute, CTRS

## 2018-01-04 NOTE — PROGRESS NOTES
PHYSICAL THERAPY DAILY NOTE  Time In: 1115  Time Out: 1200  Patient Seen For: AM;Balance activities;Gait training;Patient education; Therapeutic exercise;Transfer training; Wheelchair mobility; Other (see progress notes)    Subjective: patient reporting she wants to go home tomorrow and agreeable to OP PT. Reports  can come in for family training tomorrow. Reports she prefers to go home with elastic foot device instead of carbon fiber AFOs         Objective:Vital Signs:  Patient Vitals for the past 12 hrs:   Temp Pulse Resp BP SpO2   01/04/18 0726 98.3 °F (36.8 °C) 82 16 96/63 96 %     Pain level:0 to 4 out of 10  Pain location:low back to right buttock  Pain interventions:Pain medication, rest, positioning,body mechanics    Patient education:Balance training,transfer training, gait training, fall precautions, activity pacing, spinal precautions, w/c mobility and parts management, body mechanics, Patient verbalizing understanding and demonstrating partial understanding of patient education. Recommend follow up education. Interdisciplinary Communication:spoke with MSW regarding D/C plans    Spinal  GROSS ASSESSMENT Daily Assessment     NA       BED/MAT MOBILITY Daily Assessment    Rolling Right : 0 (Not tested)  Rolling Left : 0 (Not tested)  Supine to Sit : 0 (Not tested)  Sit to Supine : 0 (Not tested)       TRANSFERS Daily Assessment      Transfer Type: SPT with walker  Transfer Assistance : 5 (Supervision/setup)  Sit to Stand Assistance: Modified independent  Car Transfers: Not tested       GAIT Daily Assessment    Amount of Assistance: 5 (Supervision/setup) verbal cues for improved hip stability during stance phase and improving width of base of support  Distance (ft): 150 Feet (ft) (150ft x 3)  Assistive Device: Walker, rolling;Brace/Splint (Ossur toe up for LLE,ace wrap for R DF assist)   Gait training with trial/assessment carbon fiber low profile AFOs with posterior support.      STEPS or STAIRS Daily Assessment   Single step at a time leading up with RLE and down with LLE, Cues for sequencing and foot pleacement and posture Steps/Stairs Ambulated (#): 8  Level of Assist : 5 (Supervision/setup)  Rail Use: Left  (left going up , right going down)       BALANCE Daily Assessment    Sitting - Static: Good (unsupported)  Sitting - Dynamic: Good (unsupported)  Standing - Static: Good (with RW)  Standing - Dynamic : Impaired       WHEELCHAIR MOBILITY Daily Assessment   Using UEs and LEs Able to Propel (ft): 150 feet  Functional Level: 6  Curbs/Ramps Assist Required (FIM Score): 0 (Not tested)  Wheelchair Setup Assist Required : 6 (Modified independent)  Wheelchair Management: Manages left brake;Manages right brake       LOWER EXTREMITY EXERCISES Daily Assessment    Extremity: Both  Exercise Type #1: Other (comment) (Single limb stance stability with single UE support inhibiting trendelenburg)  Sets Performed: 2  Reps Performed: 10  Level of Assist: Supervision (with UE support on parallel bars)          Assessment: Progressing towards modified independence with functional mobility. Slowly improving with ability to stabilize hips during stance phase. At this time patient prefers to go home with Ossur Toe Up devices instead of carbon fiber AFOs. Patient demonstrating improved step clearance and improved ankle DF at initial contact with carbon fiber AFOs. Patient is safe to ambulate inside home with RW and Ossur Toe up devices at this time. As patient becomes more independent in the community may need to consider Carbon Fiber AFOs if anterior tibialis strength has not improved significantly. Patient return to room at end of treatment and remained up in wheelchair with needs in reach    Plan of Care: Continue with POC and progress as tolerated.      Jarad Hernandez, PT  1/4/2018

## 2018-01-04 NOTE — PROGRESS NOTES
01/04/18 0914   Time Spent With Patient   Time In 0830   Time Out 0904   Patient Seen For: AM;ADLs   Feeding/Eating   Feeding/Eating Assistance I   Grooming   Grooming Assistance  Mod I   Comments seated in WC at sink    Upper Body Bathing   Bathing Assistance, Upper Mod I   Position Performed Other (comment)  (seated on tub transfer bench )   Adaptive Equipment Tub bench   Lower Body Bathing   Bathing Assistance, Lower  Mod I   Position Performed Standing; Other (comment)  (seated on tub transfer bench )   Adaptive Equipment Tub bench   29 Andreea Hollins (FIM Score) S   Upper Body Dressing    Dressing Assistance  Mod I   Comments gathered clothing at wheelchair level    [id number] [facility]  SBA   Leg Crossed Method Used Yes   Position Performed Seated in chair;Standing   Adaptive Equipment Used Grab bar   Functional Transfers   Toilet Transfer  Elevated seat;Grab bars   Amount of Assistance Required SBA   Tub or Shower Type Shower   Amount of Assistance Required SBA   Adaptive Equipment Tub transfer bench;Grab bars; Wheelchair   S: \"I'm doing great. \"   O: Patient presented seated up in [facility] gathering clothing. Agreeable to treatment. Patient completed ADL; see above for FIMs. A: Patient progressing well with all ADL tasks and functional mobility in WC. Remains limited by foot drop bilaterally. Patient tolerated session well with no complaint of pain. P: Continue OT POC with focus on ADL/IADL skills, functional transfers, functional mobility, coordination, strength, static and dynamic balance, and activity tolerance to maximize safety and independence with ADLs and functional transfers. Patient was left seated up in [facility] in room with call bell/all other needs in reach. Interdisciplinary communication: Collaborated with PT and confirmed that patient is on track to meet goals.      Darryl Jauregui MS, OTR/L  1/4/2018

## 2018-01-04 NOTE — PROGRESS NOTES
Team conference; discharge scheduled for tomorrow (1/5) to home with family and outpatient PT. Discussed with pt agreeable. Outpatient PT scheduled at [address] in [facility]. Appointment time in AVS.  WC and cushion ordered from [employer]. Cancelled RW order as pt can get one from family member. Spouse to come in AM prior to dc for family training.

## 2018-01-04 NOTE — PROGRESS NOTES
[facility] PROGRESS NOTE    Jazmine Basilio  Admit Date: 12/29/2017    Chief Complaint : Gait dysfunction secondary to below. Admit Diagnosis: spinal stenosis; Incomplete paraplegia (HCC)  Neurogenic bowel and bladder  Acute Rehab Dx:  Gait impairment/ gait dysfunction  Debility    deconditioning  Mobility and ambulation deficits  Self Care/ADL deficits     Subjective     Patient seen and examined. Vss. Afebrile. Patient has no new complaints. Feels stronger, except, still minimal return in L4/5 deficits. Voiding spontaneously. continent of B/B.  Staff report on team conference she is appropriate for home discharge, and patient wishes home discharge in am. Patient ambulating at Mod I level using a walker, DF assist.     Objective:     Current Facility-Administered Medications   Medication Dose Route Frequency    acetaminophen (TYLENOL) tablet 650 mg  650 mg Oral Q4H PRN    famotidine (PEPCID) tablet 20 mg  20 mg Oral Q12H    HYDROcodone-acetaminophen (NORCO)  mg tablet 1 Tab  1 Tab Oral Q4H PRN    senna-docusate (PERICOLACE) 8.6-50 mg per tablet 2 Tab  2 Tab Oral DAILY    heparin (porcine) injection 5,000 Units  5,000 Units SubCUTAneous Q12H    alum-mag hydroxide-simeth (MYLANTA) oral suspension 30 mL  30 mL Oral Q4H PRN    bisacodyl (DULCOLAX) suppository 10 mg  10 mg Rectal DAILY PRN    DULoxetine (CYMBALTA) capsule 60 mg  60 mg Oral QHS    lactulose (CHRONULAC) solution 30 g  30 g Oral PRN    magnesium hydroxide (MILK OF MAGNESIA) 400 mg/5 mL oral suspension 30 mL  30 mL Oral DAILY PRN    ondansetron (ZOFRAN ODT) tablet 8 mg  8 mg Oral Q6H PRN    polyethylene glycol (MIRALAX) packet 17 g  17 g Oral DAILY    simethicone (MYLICON) tablet 80 mg  80 mg Oral QID PRN    sodium phosphate (FLEET'S) enema 118 mL  1 Enema Rectal PRN    tamsulosin (FLOMAX) capsule 0.4 mg  0.4 mg Oral DAILY    temazepam (RESTORIL) capsule 30 mg  30 mg Oral QHS PRN    traMADol (ULTRAM) tablet 50 mg  50 mg Oral Q6H PRN     Review of Systems  :Denies chest pain, shortness of breath, cough, headache, visual problems, abdominal pain, dysurea, calf pain. Pertinent positives are as noted in the medical records and unremarkable otherwise. Physical Exam:   General: Alert and age appropriately oriented. No acute cardio respiratory distress. HEENT: Normocephalic,   no scleral icterus  Oral mucosa moist without cyanosis   Lungs: Clear to auscultation  bilaterally. Respiration even and unlabored   Heart: Regular rate and rhythm, S1, S2   No  murmurs, clicks, rub or gallops   Abdomen: Soft, non-tender, nondistended. Bowel sounds present. No organomegaly. Genitourinary: defered   Neuromuscular:         LLE     Hip flexion:   5/5              Knee extension:  5 /5    Ankle dorsiflexion:   1+/5   EHL:   0/5   Ankle plantarflexion:   5-/5        RLE     Hip flexion:  5- /5   Knee extension:   5/5    Ankle dorsiflexion:  2- /5   EHL:   0/5   Ankle plantarflexion:   5-/5  Sensory - + areas of LLE, RLE sensory deificts      Skin/extremity: No rashes, no erythema. No calf tenderness BLE  Wound covered.  Back incision C/D/I. cold LE, feet                                                                            Functional Assessment:          Balance  Sitting - Static: Good (unsupported) (01/02/18 1500)  Sitting - Dynamic: Good (unsupported) (01/02/18 1500)  Standing - Static: Fair (01/02/18 1500)  Standing - Dynamic : Impaired (01/02/18 1500)           Toileting  Adaptive Equipment: Elevated seat;Grab bars (01/03/18 0957)         Jenni Damon Fall Risk Assessment:  Jenni Damon Fall Risk  Mobility: Ambulates or transfers with assist devices or assistance/unsteady gait (01/04/18 0134)  Mobility Interventions: Communicate number of staff needed for ambulation/transfer (01/04/18 0134)  Mentation: Alert, oriented x 3 (01/04/18 0134)  Mentation Interventions: Evaluate medications/consider consulting pharmacy (01/04/18 0134)  Medication: Patient receiving anticonvulsants, sedatives(tranquilizers), psychotropics or hypnotics, hypoglycemics, narcotics, sleep aids, antihypertensives, laxatives, or diuretics (01/04/18 0134)  Medication Interventions: Evaluate medications/consider consulting pharmacy (01/04/18 0134)  Elimination: Needs assistance with toileting (01/04/18 0134)  Elimination Interventions: Call light in reach (01/04/18 0134)  Prior Fall History: No (01/04/18 0134)  History of Falls Interventions: Consult care management for discharge planning (01/04/18 0134)  Total Score: 3 (01/04/18 0134)  Standard Fall Precautions: Yes (12/30/17 2040)  High Fall Risk: Yes (01/04/18 0134)     Speech Assessment:         Ambulation:  Gait  Distance (ft): 150 Feet (ft) (01/03/18 1528)  Assistive Device: Judieth Gretta, rolling;Brace/Splint (dept toe up on left and ace wrap on right) (01/03/18 1528)  Rail Use: Both (01/02/18 1100)     Labs/Studies:  No results found for this or any previous visit (from the past 72 hour(s)). Assessment:     Problem List as of 1/4/2018  Date Reviewed: 12/23/2017          Codes Class Noted - Resolved    Incomplete paraplegia [facility]) ICD-10-CM: G82.22  ICD-9-CM: 344.1  12/29/2017 - Present        Cauda equina syndrome with neurogenic bladder [facility]) ICD-10-CM: G83.4  ICD-9-CM: 344.61  12/23/2017 - Present              Assessment/ plan:         Assessment:L4-5 disc herniation with extrusion with severe narrowing and compression. s/p left L4-5 partial hemilaminectomy, discectomy, and foraminotomy for decompression of nerves due to acute cauda equina syndrome. This was performed on 12/23,      The Post Assessment Physician Evaluation (AMARILIS) found the current functional status to be comparable with the Pre-admission Screening. The Patient is a good candidate for acute inpatient rehabilitation.  Nothing since the Pre-admission screen has changed that determination.      Rehabilitation Plan  The patient has shown the ability to tolerate and benefit from 3 hours of therapy daily and is being admitted to a comprehensive acute inpatient rehabilitation program consisting of at least 3 hours of combined physical and occupational therapies. Continue  intensive Physical Therapy for a minimum of 1.5 hours a day, at least 5 out of 7 days per week to address bed mobility, transfers, ambulation, strengthening, balance, and endurance. Focus on LE strength funciton. Progressing well. Mod I with ambulation, transfers. Requires DF assist for foot drops. contionue intensive Occupational Therapy for a minimum of 1.5 hours a day, at least 5 out of 7 days per week to address ADL ( bathing, LE dressing, toileting) and adaptive equipment as needed. Reaching short term goals. mimimal OT needs.      The patient will also require 24-hour skilled rehabilitation nursing for bowel and bladder management, skin care for decubitus ulcer prevention , pain management and ongoing medication administration      The patient may benefit from a psychology consult for depression, adjustment disorder.         Continue daily physician medical management:  Pneumonia prophylaxis- Incentive spirometer every hour while awake     DVT risk / DVT Prophylaxis- Will require daily physician exam to assess for signs and symptoms as patient is at increased risk for of thromboembolism. Mobilization as tolerated. Intermittent pneumatic compression devices when in bed Thigh-high or knee-high thromboembolic deterrent hose when out of bed. -  started, continued on Heparin 5000 bid  - 1/4 - no signs of DVT/ PE.      Pain Control: stable, mild-to-moderate joint symptoms intermittently, reasonably well controlled by PRN meds. Will require regular pain assessment and comprenhensive pain management,   - no new pain source. Needing few prn norco /day for surgical back pain.       Wound Care: Monitor wound status daily per staff and physician. At risk for failure. Will require 24/7 rehab nursing.  Keep wound clean and dry  - keep feet/leg warm. Socks.      Hypotension; teds prn;   - 1/3 - Monitor orthostatics. SBP 100s-110s. No orthostatic symptoms.    1/4 - mild hypotension, asymptomatic.      Bilateral foot drop; neuro deficits appear to be mostly at L4., L5 roots. - will need maria esther AFOs,.cont. DF assist for PT. Still minimal motor return,      Urinary retention/ neurogenic bladder -  -PVRs minimal to none. Voiding spontaneously. No accidents. Will PVR daily. Monitor.   - UA -neg.   - added Flomax at admission. - 1/3 Voiding spontaneously, pvrs minimal.   1/4 normal bladder function. Hx of depression; cont Cymbalta; has been coping very well    candidiasis. -  Treated x 6 days with Diflucan. Discontinued,.     bowel program - cont Miralax, pericolace, daily suppos/ prn lactulose, fleets. Had mod BM yesterday and today. Denies soiling.  - 1/3  continent of bowel. No accidents. + BMs. Feels urge. 1/4 = + BM. No soiling. Reasonable control.      GERD - resume PPI. At times may need additional antacids, Maalox prn. Time spent was 25 minutes with over 1/2 in direct patient care/examination, consultation and coordination of care.      Signed By: Diane Whalen MD     January 4, 2018

## 2018-01-04 NOTE — PROGRESS NOTES
PHYSICAL THERAPY DAILY NOTE  Time In: 1304  Time Out: 6135  Patient Seen For: PM;Patient education; Therapeutic exercise;Transfer training; Other (see progress notes)    Subjective: patient reporting she has a RW that she can borrow from her dad. Reports she will be able to get a ride to OP PT. Reports the tendon on the front of her right ankle feels like it is going to cramp when doing estim         Objective:Vital Signs:  Patient Vitals for the past 12 hrs:   Temp Pulse Resp BP SpO2   01/04/18 0726 98.3 °F (36.8 °C) 82 16 96/63 96 %     Pain level:0 to 4 out of 10  Pain location:low back and right buttock  Pain interventions:Pain medication, rest, positioning,body mechanics    Patient education:Bed mobility training,transfer training, fall precautions, activity pacing, spinal precautions, w/c mobility and parts management, body mechanics, Patient verbalizing understanding and demonstrating partial understanding of patient education. Recommend follow up education. Interdisciplinary Communication:spoke with MSW regarding DME    Spinal  GROSS ASSESSMENT Daily Assessment     NA       BED/MAT MOBILITY Daily Assessment   Demonstrating correct body mechanics Rolling Right : 6 (Modified independent)  Rolling Left : 6 (Modified independent)  Supine to Sit : 6 (Modified independent)  Sit to Supine : 6 (Modified independent)       TRANSFERS Daily Assessment   Instructed patient in sit<>stand and SPT with RW with mat elevated to 33 inches simulating height of patient's bed at home. Able to complete with 6 inch step and SBA with cues.  Transfer Type: SPT with walker  Transfer Assistance : 6 (Modified independent) from hospital bed  Sit to Stand Assistance: Modified independent from w/c and hospital bed  Car Transfers: Not tested       GAIT Daily Assessment    NT       STEPS or STAIRS Daily Assessment    NT       BALANCE Daily Assessment    Sitting - Static: Good (unsupported)  Sitting - Dynamic: Good (unsupported)  Standing - Static: Good (with RW)  Standing - Dynamic : Impaired       WHEELCHAIR MOBILITY Daily Assessment    Able to Propel (ft): 150 feet  Functional Level: 6  Curbs/Ramps Assist Required (FIM Score): 0 (Not tested)  Wheelchair Setup Assist Required : 6 (Modified independent)  Wheelchair Management: Manages left brake;Manages right brake       LOWER EXTREMITY EXERCISES Daily Assessment   ESTIM to right then left anterior tibialis x 5 mins each with 5 sec on 5 sec off cycle. Intensity to 27 to 31 erlin amps, frequncy 80 to 150HZ in pemodulated mode Extremity: Both  Exercise Type #1: Supine lower extremity strengthening  Sets Performed: 1  Reps Performed: 30  Level of Assist: Supervision (set up assist )          Assessment: patient has met goals per eval. Progress to modified independence in room with w/c and RW.  Good contraction of right anterior tib with estim ,limited with left       Patient to recreational therapy at end of treatment    Plan of Care: complete D/C assessment in AM tomorrow    Perla Duncan, PT  1/4/2018

## 2018-01-04 NOTE — PROGRESS NOTES
End Of Shift Functional Summary, Nursing      TOILETING:  Does patient need assist with clothing management and/or pericare? No    TOILET TRANSFER:  Pt requires standby assistance/setup. Pt uses wheelchair. BLADDER:  Pt does not have a muniz catheter that staff manages. Pt does take medication. Pt is continent. of bladder and voids in toilet   Pt has had 0 bladder accidents during this shift   (An accident is when the episode is not contained in a brief AND/OR the clothing/linen requires changing/cleaning up.)    BOWEL:  Pt does take medication. Pt is continent of bowel and uses toilet. }    Pt has had 0 bowel accidents during this shift  . (An accident is when the episode is not contained in a brief AND/OR the clothing/linen requires changing/cleaning up.)    BED/CHAIR TRANSFER  Pt requires standby assistance/setup. Patient requires the assistance of 1 staff member(s). Pt uses walker. Pt has green socks and   Going home on 1/5/17. Documentation reviewed and plan of care discussed/reviewed with   patient during the shift.

## 2018-01-05 VITALS
DIASTOLIC BLOOD PRESSURE: 65 MMHG | HEART RATE: 87 BPM | SYSTOLIC BLOOD PRESSURE: 98 MMHG | OXYGEN SATURATION: 98 % | RESPIRATION RATE: 17 BRPM | TEMPERATURE: 98.2 F

## 2018-01-05 PROCEDURE — 99239 HOSP IP/OBS DSCHRG MGMT >30: CPT | Performed by: PHYSICAL MEDICINE & REHABILITATION

## 2018-01-05 PROCEDURE — 97110 THERAPEUTIC EXERCISES: CPT

## 2018-01-05 PROCEDURE — 74011250637 HC RX REV CODE- 250/637: Performed by: PHYSICAL MEDICINE & REHABILITATION

## 2018-01-05 PROCEDURE — 97535 SELF CARE MNGMENT TRAINING: CPT

## 2018-01-05 PROCEDURE — 97116 GAIT TRAINING THERAPY: CPT

## 2018-01-05 PROCEDURE — 97530 THERAPEUTIC ACTIVITIES: CPT

## 2018-01-05 RX ADMIN — STANDARDIZED SENNA CONCENTRATE AND DOCUSATE SODIUM 2 TABLET: 8.6; 5 TABLET, FILM COATED ORAL at 07:57

## 2018-01-05 RX ADMIN — HYDROCODONE BITARTRATE AND ACETAMINOPHEN 1 TABLET: 10; 325 TABLET ORAL at 07:57

## 2018-01-05 RX ADMIN — FAMOTIDINE 20 MG: 20 TABLET, FILM COATED ORAL at 07:56

## 2018-01-05 RX ADMIN — POLYETHYLENE GLYCOL (3350) 17 G: 17 POWDER, FOR SOLUTION ORAL at 07:58

## 2018-01-05 RX ADMIN — TAMSULOSIN HYDROCHLORIDE 0.4 MG: 0.4 CAPSULE ORAL at 07:57

## 2018-01-05 NOTE — DISCHARGE INSTRUCTIONS
Spinal Cord Injury (Paraplegic): Care Instructions  Your Care Instructions  A spinal cord injury occurs when a bone of the spine (vertebra) cuts or presses on the spinal cord. This can happen after a fall, car accident, or sports injury. It can also happen if something pierces the spinal cord. A spinal cord injury stops the communication between the brain and the rest of the body. The closer the injury is to the head, the more the body is affected. A serious spinal cord injury in the middle of the back usually causes loss of use of the legs (paralysis). It also usually causes loss of feeling in the legs. Loss of use and feeling in the legs is called paraplegia. First treatments for spinal cord injuries include preventing more damage to the spine and spinal cord. This can be done with braces, casts, straps, or surgery. Medicine may reduce swelling in the spinal cord. You may need surgery to remove bone, straighten the spine, or make the spine more stable. Long-term rehabilitation includes exercises to strengthen muscles that still work. You will also get help learning how to use braces and other tools to do everyday tasks. Researchers are working on new treatments. Some medicines may help the spinal cord heal. Implanted devices may help restore lost function. Realizing you are paralyzed is scary. You will feel many emotions. And you may need help coping. Seek out family, friends, and counselors for support. You also can do things at home to make yourself feel better while you go through treatment. Follow-up care is a key part of your treatment and safety. Be sure to make and go to all appointments, and call your doctor if you are having problems. It's also a good idea to know your test results and keep a list of the medicines you take. How can you care for yourself at home? · Let yourself grieve for the things you can no longer do. Talk about your feelings with a family member, friend, or counselor.  This can help you recover as much as possible. · Learn to take care of your bladder. This helps you avoid getting a urinary tract infection. You may need to insert a thin tube into your bladder regularly. This tube is called a catheter. It drains the urine from your bladder. A nurse will show you how to do this. · Work with your doctor or other health professional to make a bowel management program. This will help you have regular bowel movements. · Check your body often for signs of pressure injuries. These can be slow to heal and may become infected. They usually occur on the skin over bony areas such as the knees, hips, heels, or tailbone. And pressure injuries can occur in places where the skin folds over on itself. Change positions often to help prevent these sores. · Be alert for signs of a common problem called autonomic dysreflexia. This can happen when your body can't control blood pressure. It can cause headache, clammy skin, sweating, nausea, and a slow heart rate. · Do the exercises recommended by your therapist. Strong muscles can help you do everyday activities. · Get help with coping if you need it. Discuss your concerns with your doctor, counselor, or other health professional.  · Join a support group. Talking about your injury with other people who have problems like yours can help you learn to live with a spinal injury. When should you call for help? Call 911 anytime you think you may need emergency care. For example, call if:  ? · You have signs of a common problem called autonomic dysreflexia and the symptoms do not go away after 20 minutes. These include:  ¨ A pounding headache. ¨ A flushed face or red patches on your skin above the level of the spinal injury. ¨ Sweating above the level of the spinal injury. ¨ Nausea. ¨ Slow heart rate. ¨ Cold, clammy skin above the level of the spinal injury.    ?Call your doctor now or seek immediate medical care if:  ? · You have signs of infection, such as:  ¨ Increased pain, swelling, warmth, or redness. ¨ Red streaks leading from an incision. ¨ Pus draining from an incision. ¨ A fever. ? · You need help with urination and bowel movements.           ___________________________________________________________________________________________________________________________________  DISCHARGE SUMMARY from Nurse    PATIENT INSTRUCTIONS:    After general anesthesia or intravenous sedation, for 24 hours or while taking prescription Narcotics:  · Limit your activities  · Do not drive and operate hazardous machinery  · Do not make important personal or business decisions  · Do  not drink alcoholic beverages  · If you have not urinated within 8 hours after discharge, please contact your surgeon on call. Report the following to your surgeon:  · Excessive pain, swelling, redness or odor of or around the surgical area  · Temperature over 100.5  · Nausea and vomiting lasting longer than 4 hours or if unable to take medications  · Any signs of decreased circulation or nerve impairment to extremity: change in color, persistent  numbness, tingling, coldness or increase pain  · Any questions    What to do at Home:  Recommended activity: PT DOESN'T REQUIRE ANY HELP WITH SELF CARE TASK . RECOMMEND HELP WITH WORKING AND  CLEANING. PT WALKS WITH A ROLLING WALKER WITHOUT ASSISTANCE          *  Please give a list of your current medications to your Primary Care Provider. *  Please update this list whenever your medications are discontinued, doses are      changed, or new medications (including over-the-counter products) are added. *  Please carry medication information at all times in case of emergency situations. These are general instructions for a healthy lifestyle:    No smoking/ No tobacco products/ Avoid exposure to second hand smoke  Surgeon General's Warning:  Quitting smoking now greatly reduces serious risk to your health.     Obesity, smoking, and sedentary lifestyle greatly increases your risk for illness    A healthy diet, regular physical exercise & weight monitoring are important for maintaining a healthy lifestyle    You may be retaining fluid if you have a history of heart failure or if you experience any of the following symptoms:  Weight gain of 3 pounds or more overnight or 5 pounds in a week, increased swelling in our hands or feet or shortness of breath while lying flat in bed. Please call your doctor as soon as you notice any of these symptoms; do not wait until your next office visit. .    The discharge information has been reviewed with the patient. The patient verbalized understanding. Discharge medications reviewed with the patient and appropriate educational materials and side effects teaching were provided.   ___________________________________________________________________________________________________________________________________

## 2018-01-05 NOTE — PROGRESS NOTES
Problem: Falls - Risk of  Goal: *Absence of Falls  Document Judith Fall Risk and appropriate interventions in the flowsheet.    Outcome: Progressing Towards Goal  Fall Risk Interventions:  Mobility Interventions: Communicate number of staff needed for ambulation/transfer, Strengthening exercises (ROM-active/passive), Patient to call before getting OOB    Mentation Interventions: Evaluate medications/consider consulting pharmacy    Medication Interventions: Patient to call before getting OOB    Elimination Interventions: Call light in reach, Elevated toilet seat, Patient to call for help with toileting needs    History of Falls Interventions: Consult care management for discharge planning, Evaluate medications/consider consulting pharmacy, Door open when patient unattended, Room close to nurse's station

## 2018-01-05 NOTE — DISCHARGE SUMMARY
REHABILITATION DISCHARGE SUMMARY     Date of admission to [facility]: 12/29/2017  Discharge Date: 1/5/2017  Neurosurgeon; Dr Kunal Ram  Primary Care Physician: none listed  Admission Condition: good  Discharged Condition: good    Hospital Course: The patient was admitted to [place] [address] on 12/29/2017 with incomplete paraplegia due to disk herniation    Rehabilitation Course:       Functional Level On Admission: SBA with transfers and CGA to ambulate 110 ft with a RW. She continues to have bilateral foot drop. She is mod A with LE dressing, bathing and toileting and set up with grooming and UE dressing    Functional Level At Discharge:     BED/MAT MOBILITY Daily Assessment   Demonstrating correct body mechanics Rolling Right : 6 (Modified independent)  Rolling Left : 6 (Modified independent)  Supine to Sit : 6 (Modified independent)  Sit to Supine : 6 (Modified independent)         TRANSFERS Daily Assessment   Instructed patient in sit<>stand and SPT with RW with mat elevated to 33 inches simulating height of patient's bed at home. Able to complete with 6 inch step and SBA with cues.  Transfer Type: SPT with walker  Transfer Assistance : 6 (Modified independent) from hospital bed  Sit to Stand Assistance: Modified independent from w/c and hospital bed  Car Transfers: Not tested         GAIT Daily Assessment     NT         STEPS or STAIRS Daily Assessment     NT         BALANCE Daily Assessment     Sitting - Static: Good (unsupported)  Sitting - Dynamic: Good (unsupported)  Standing - Static: Good (with RW)  Standing - Dynamic : Impaired         WHEELCHAIR MOBILITY Daily Assessment     Able to Propel (ft): 150 feet  Functional Level: 6  Curbs/Ramps Assist Required (FIM Score): 0 (Not tested)  Wheelchair Setup Assist Required : 6 (Modified independent)  Wheelchair Management: Manages left brake;Manages right brake         LOWER EXTREMITY EXERCISES Daily Assessment   ESTIM to right then left anterior tibialis x 5 mins each with 5 sec on 5 sec off cycle. Intensity to 27 to 31 erlin amps, frequncy 80 to 150HZ in pemodulated mode Extremity: Both  Exercise Type #1: Supine lower extremity strengthening  Sets Performed: 1  Reps Performed: 30  Level of Assist: Supervision (set up assist )             Assessment: patient has met goals per eval. Progress to modified independence in room with w/c and RW. Good contraction of right anterior     IADL Daily Assessment   Patient completed simulated kitchen tasks at RW level with SBA to supervision, with verbal cues for technique. Provided multiple suggestions including but not limited to use of walker basket, raising objects to decrease risk of bending, use of non-skid material to place on top of basket to transport items, and foot placement to decrease twisting during standing and turning tasks. Educated patient in techniques for completing IADL tasks such as cooking with verbalized understanding. Good understanding of all education.       Activity Tolerance Daily Assessment   Patient completed static standing tolerance task at elevated table with SBA, with constant one hand support, while engaging in cognitive and coordination task, Connect 4. Patient stood for a total of approximately 10 minutes with three seated rest breaks. Progressing well.       Education Daily Assessment   Educated patient on driving rehab options and provided patient with written information regarding program at [facility]. Educated patient to discuss with physician at discharge. Good understanding of all education.       Patient was left seated up in [facility] for PT.      Assessment: Progressing well. On track for discharge.    Plan: Continue OT POC with focus on ADL/IADL skills, functional transfers, functional mobility, coordination, strength, static and dynamic balance, and activity tolerance to maximize safety and independence with ADLs and functional transfers.      For: AM;ADLs   Feeding/Eating   Feeding/Eating Assistance I   Grooming   Grooming Assistance  Mod I   Comments seated in WC at sink    Upper Body Bathing   Bathing Assistance, Upper Mod I   Position Performed Other (comment)  (seated on tub transfer bench )   Adaptive Equipment Tub bench   Lower Body Bathing   Bathing Assistance, Lower  Mod I   Position Performed Standing; Other (comment)  (seated on tub transfer bench )   Adaptive Equipment Tub bench   29 Andreea Hollins (FIM Score) S   Upper Body Dressing    Dressing Assistance  Mod I   Comments gathered clothing at wheelchair level    [id number]  SBA   Leg Crossed Method Used Yes   Position Performed Seated in chair;Standing   Adaptive Equipment Used Grab bar   Functional Transfers   Toilet Transfer  Elevated seat;Grab bars   Amount of Assistance Required SBA   Tub or Shower Type Shower   Amount of Assistance Required SBA   Adaptive Equipment Tub transfer bench;Grab bars; Wheelchair   S: \"I'm doing great. \"   O: Patient presented seated up in [facility] gathering clothing. Agreeable to treatment. Patient completed ADL; see above for FIMs. A: Patient progressing well with all ADL tasks and functional mobility in WC. Remains limited by foot drop bilaterally. Patient tolerated session well with no complaint of pain. P: Continue OT POC with focus on ADL/IADL skills, functional transfers, functional mobility, coordination, strength, static and dynamic balance, and activity tolerance to maximize safety and independence with ADLs and functional transfers.            Past Medical History:   Diagnosis Date    Asthma     Back pain       Past Surgical History:   Procedure Laterality Date    HX CYST REMOVAL      to groin    HX TYMPANOSTOMY        No family history on file. Social History   Substance Use Topics    Smoking status: Never Smoker    Smokeless tobacco: Current User    Alcohol use No     Prior to Admission medications    Medication Sig Start Date End Date Taking? Authorizing Provider   bisacodyl (DULCOLAX) 10 mg suppository Insert 10 mg into rectum daily as needed. 1/4/18  Yes Tess Olson MD   HYDROcodone-acetaminophen [facility])  mg tablet Take 1 Tab by mouth every four (4) hours as needed. Max Daily Amount: 6 Tabs. 1/4/18  Yes Tess Olson MD   polyethylene glycol (MIRALAX) 17 gram packet Take 1 Packet by mouth daily. 1/5/18  Yes Tess lOson MD   HYDROcodone-acetaminophen [facility])  mg tablet Take 1 Tab by mouth. Yes Williams Garcia MD   DULoxetine (CYMBALTA) 60 mg capsule Take 60 mg by mouth daily. Yes Williams Garcia MD   ascorbic acid, vitamin C, (VITAMIN C) 250 mg tablet Take  by mouth. Yes Phys MD Radha   Biotin 2,500 mcg cap Take  by mouth. Phys MD Radha     Allergies   Allergen Reactions    Doxycycline Nausea and Vomiting    Pcn [Penicillins] Rash    Percocet [Oxycodone-Acetaminophen] Nausea and Vomiting        Lab Review: No results found for this or any previous visit (from the past 72 hour(s)). Functional Assessment:          Balance  Sitting - Static: Good (unsupported) (01/04/18 1400)  Sitting - Dynamic: Good (unsupported) (01/04/18 1400)  Standing - Static: Good (with RW) (01/04/18 1400)  Standing - Dynamic : Impaired (01/04/18 1400)           Toileting  Adaptive Equipment: Elevated seat;Grab bars (01/03/18 0957)         Jahaira William Fall Risk Assessment:  Jahaira William Fall Risk  Mobility: Unable to ambulate or transfer (01/04/18 2002)  Mobility Interventions: Communicate number of staff needed for ambulation/transfer;Strengthening exercises (ROM-active/passive); Patient to call before getting OOB (01/04/18 0830)  Mentation: Alert, oriented x 3 (01/04/18 2002)  Mentation Interventions: Evaluate medications/consider consulting pharmacy (01/04/18 0830)  Medication: Patient receiving anticonvulsants, sedatives(tranquilizers), psychotropics or hypnotics, hypoglycemics, narcotics, sleep aids, antihypertensives, laxatives, or diuretics (01/04/18 2002)  Medication Interventions: Patient to call before getting OOB (01/04/18 2002)  Elimination: Independent in elimination (01/04/18 2002)  Elimination Interventions: Call light in reach;Elevated toilet seat;Patient to call for help with toileting needs (01/04/18 0830)  Prior Fall History: No (01/04/18 2002)  History of Falls Interventions: Consult care management for discharge planning;Evaluate medications/consider consulting pharmacy; Door open when patient unattended;Room close to nurse's station (01/04/18 0830)  Total Score: 1 (01/04/18 2002)  Standard Fall Precautions: Yes (01/04/18 2002)  High Fall Risk: Yes (01/04/18 0830)     Speech Assessment:         Ambulation:  Gait  Distance (ft): 0 Feet (ft) (01/04/18 1400)  Assistive Device: Walker, rolling;Brace/Splint ([place] toe up for LLE,ace wrap for R DF assist) (01/04/18 1200)  Rail Use: Left  (left going up , right going down) (01/04/18 1200)     Visit Vitals    /60 (BP 1 Location: Left arm, BP Patient Position: At rest;Sitting)    Pulse (!) 101    Temp 98.6 °F (37 °C)    Resp 17    SpO2 97%      Intake and Output:    01/03 0701 - 01/04 1900  In: 1080 [P.O.:1080]  Out: -     Discharge Exam:  General:  Alert, oriented and mood affect appropriate   Lungs:   Clear to auscultation bilaterally. Heart:  Regular rate and rhythm, S1, S2 stable, no murmur, click, rub or gallop. Abdomen:   Soft, non-tender. Bowel sounds present. No masses,  No organomegaly. Genitourinary: Continent and PVR is stable   Neuro Muscular: Stable. RIGHT DF 3-, left DF 1+  Right PF 4, left PF 3+  Adduction 5- ,    Skin:  No rashes, lesions, or signs/symptoms or infection.        Incision(s): healing well,clean, dry, and intact, dry    Problem List as of 1/4/2018  Date Reviewed: 12/23/2017          Codes Class Noted - Resolved    Incomplete paraplegia [facility]) ICD-10-CM: G82.22  ICD-9-CM: 344.1  12/29/2017 - Present        Cauda equina syndrome with neurogenic bladder [facility]) ICD-10-CM: G83.4  ICD-9-CM: 344.61  12/23/2017 - Present              Discharge Instructions:   1. Diet: Regular Diet   2. Activity: No driving until cleared by MD. No lifting, pushing or pulling more than 5lbs. No forward flexion beyond 90 degrees, cont spinal precautions  3. Wound Care: Keep wound clean and dry  Current Discharge Medication List      START taking these medications    Details   bisacodyl (DULCOLAX) 10 mg suppository Insert 10 mg into rectum daily as needed. Qty: 30 Suppository, Refills: 2    Associated Diagnoses: Lumbar disc herniation with radiculopathy; Cauda equina syndrome ([address] Neurogenic bowel; Cauda equina syndrome with neurogenic bladder (HCC)      !! HYDROcodone-acetaminophen (NORCO)  mg tablet Take 1 Tab by mouth every four (4) hours as needed. Max Daily Amount: 6 Tabs. Qty: 120 Tab, Refills: 0    Associated Diagnoses: Lumbar disc herniation with radiculopathy; Cauda equina syndrome ([address] Neurogenic bowel; Cauda equina syndrome with neurogenic bladder (HCC)      polyethylene glycol (MIRALAX) 17 gram packet Take 1 Packet by mouth daily. Qty: 30 Each, Refills: 2    Associated Diagnoses: Neurogenic bowel       !! - Potential duplicate medications found. Please discuss with provider. CONTINUE these medications which have NOT CHANGED    Details   !! HYDROcodone-acetaminophen (NORCO)  mg tablet Take 1 Tab by mouth. DULoxetine (CYMBALTA) 60 mg capsule Take 60 mg by mouth daily. ascorbic acid, vitamin C, (VITAMIN C) 250 mg tablet Take  by mouth. Biotin 2,500 mcg cap Take  by mouth. !! - Potential duplicate medications found. Please discuss with provider. Rehabilitation Management:   1. Devices: wc with cushion ordered per [employer]. Pt has a RW  2.  Consult: Rehab team including PT, OT, recreational therapy, and     Disposition: home with outpt PT in [facility])    Follow-up with Dr. Arleth Savage and myself  >30 min    Signed By: Sarah David MD     January 5, 2017

## 2018-01-05 NOTE — PROGRESS NOTES
PHYSICAL THERAPY DISCHARGE SUMMARY  TIME   TIME OUT 1020   Precautions at discharge: Spinal;Other (comment) (Foot drop with increased risk for falling)    Problem List:    Decreased strength B LE  [x]     Decreased strength trunk/core  [x]     Decreased AROM   [x]     Decreased PROM  []     Decreased balance sitting  []     Decreased balance standing  [x]     Decreased endurance  [x]     Pain  [x]       Functional Limitations:   Decreased independence with bed mobility  [x]     Decreased independence with functional transfers  [x]     Decreased independence with ambulation  [x]     Decreased independence with stair negotiation  [x]            Outcome Measures: Vital Signs:  Patient Vitals for the past 12 hrs:   Temp Pulse Resp BP SpO2   01/05/18 0700 98.2 °F (36.8 °C) 87 17 98/65 98 %     Pain level:0 to 4 out of 10  Pain location:low back and right buttock  Pain interventions:Pain medication, rest, positioning,body mechanics    Patient education:Bed mobility training,transfer training, gait training, fall precautions, activity pacing, spinal precautions, w/c mobility and parts management,body mechanics Patient verbalizing understanding and demonstrating understanding of patient education.  Recommend follow up education with OP PT    Interdisciplinary Communication:spoke with MSW regarding D/C plans and DME       MMT Initial Asssessment   Right Lower Extremity Left Lower Extremity   Hip Flexion 4 4   Knee Extension 5 5   Knee Flexion 4+ 4+   Ankle Dorsiflexion 1 1      MMT Discharge Assessment   Right Lower Extremity Left Lower Extremity   Hip Flexion 4 4   Knee Extension 5 5   Knee Flexion 4+ 4+   Ankle Dorsiflexion 2- 1   0/5 No palpable muscle contraction  1/5 Palpable muscle contraction, no joint movement  2-/5 Less than full range of motion in gravity eliminated position  2/5 Able to complete full range of motion in gravity eliminated position  2+/5 Able to initiate movement against gravity  3-/5 More than half but not full range of motion against gravity  3/5 Able to complete full range of motion against gravity  3+/5 Completes full range of motion against gravity with minimal resistance  4-/5 Completes full range of motion against gravity with minimal-moderate resistance  4/5 Completes full range of motion against gravity with moderate resistance  4+/5 Completes full range of motion against gravity with moderate-maximum resistance  5/5 Completes full range of motion against gravity with maximum resistance     AROM: hip and knee WFL, ankle limited due to DF weakness    FIM SCORES Initial Assessment Discharge Assessment   Bed/Chair/Wheelchair Transfers 4 6   Wheelchair Mobility  (NT secondary to primary mode of locomotion is ambulation) 6   Walking [place] 2 6   Steps/Stairs  (NT) 5   PRIMARY MODE OF LOCOMOTION: ambulation  Please see IRC Interdisciplinary Eval: Coordination/Balance Section for details regarding FIM score description. BED/CHAIR/WHEELCHAIR TRANSFERS Initial Assessment Discharge Assessment   Rolling Right 5 (Supervision) 6 (Modified independent)   Rolling Left 5 (Supervision) 6 (Modified independent)   Supine to Sit 5 (Supervision) 6 (Modified independent)   Sit to Stand Contact guard assistance Modified independent   Sit to Supine 5 (Supervision) 6 (Modified independent)   Transfer Assist Score 4 6   Transfer Type SPT with walker SPT with walker   Comments Increased time and effort to complete with cues for log rolling and body mechanics for supine<>sidelyiing<>sit<>stand. Increased time and effort to complete demonstrating correct log rolling and supine<>sidelying<>sit <>stand techniques Instructed patient on sit<>stand and SPT from w/c<>RW using 6 inch step and mat elevated to 31 inches to simulate height of her bed at home.  Able to complete with SBA and DF assist as noted below   Car Transfer Not tested Not tested (verbal instruction)   Car Type           WHEELCHAIR MOBILITY/MANAGEMENT Initial Assessment Discharge Assessment   Able to [place] 150 feet 200 feet   Functional Level  (NT secondary to primary mode of locomotion is ambulation) 6   Curbs/ramps assistance required 0 (Not tested) 0 (Not tested)   Wheelchair set up assistance required 0 (Not tested) 6 (Modified independent)   Wheelchair management Manages left brake, Manages right brake Manages left brake;Manages right brake;Manages left footrest;Manages right footrest       WALKING INDEPENDENCE Initial Assessment Discharge Assessment   Assistive device Walker, rolling, Gait belt, Other (comment) (bilateral ace wraps for DF assist) Walker, rolling;Brace/Splint ([place] Toe up device LLE, Ace wrap for R DF assist)   Ambulation assistance - level surface 4 (Minimal assistance) 6 (Modified independent)   Distance 100 Feet (ft) (100ft x 2) 400 Feet (ft)   Functional Level 2 6   Comments slow cont partial step through gait pattern with marching type gait pattern due to foot drop with ankle PF and knee flex at initial contact.  slow cont step through gait with decreased ankle PF and knee flexion at terminal stance, decreased ankle DF at midswing and decreased ankle DF at initial contact   Ambulation assistance - unlevel surface 0 (Not tested) 5 (Stand-by assistance) (with RW and DF assist as noted above)       STEPS/STAIRS Initial Assessment Discharge Assessment   Steps/Stairs ambulated 0 4   Rail Use Both Left    Functional Level  (NT) 5   Comments Unable to ambulate up/down steps at this time due to bilateral foot drop, back pain and hip weakness single step at a time leading up with RLE and down with LLE in side stepping pattern, increased time and effort to complete   Curbs/Ramps 0 (Not tested) 6 (Modified independent) (up/down 6 inch step with RW and DF assist as noted above)     QUALITY INDICATOR ASSIST COMMENTS   Walk 10 feet MODIFIED INDEPENDENT WITH RW AND DF ASSIST    Walk 50 feet with 2 turns MODIFIED INDEPENDENT WITH RW AND DF ASSIST Walk 150 feet MODIFIED INDEPENDENT WITH RW AND DF ASSIST    Walk 10 feet on uneven  SBA WITH RW AND DF ASSIST    1 step/curb MODIFIED INDEPENDENT WITH RW AND DF ASSIST    4 steps MODIFIED INDEPENDENT WITH SINGLE HAND RAIL AND DF ASSIST    12 steps Unable      object Unable due to spinal precautions    Wheel 50' w/2 turns Independent    Wheel 150' Independent             PHYSICAL THERAPY PLAN OF CARE    LTGs: patient met all goals per eval     Pt would benefit from continued skilled physical therapy in order to improve independent functional mobility within the home with use of least restrictive device. Interventions may include range of motion (AROM, PROM B LE/trunk), motor function (B LE/trunk strengthening/coordination), activity tolerance (vitals, oxygen saturation levels), bed mobility training, balance activities, gait training (progressive ambulation program), and functional transfer training. HEP handout:Issued written LE HEP. Able to complete with set up assist and cues. SUPINE EXERCISES Sets Reps Comments   Ankle Pumps 1 30    Quad Sets 1 30    Heel Slides 1 30    Hip Abduction 1 30    Short Arc Quad 1 30    Passive heel cord stretch x 10 reps each with 10 sec holds    Pt to be discharged 01/05/17 with assistance provided by spouse and childern. family training complete this AM with patient and her . Instructed patient's  on how to assist patient with LE HEP and functional mobility listed above and wheelchair parts management.  and patient verbalizing and demonstrating understanding. Therapy Recommendations upon discharge: Outpatient PT   Equipment needs at discharge: [employer] provided a wheelchair. Patient using RW provided by family member. RW had 3 inch swivel wheels and changed those out for 3 inch fixed wheels      Please see IRC;  Interdisciplinary Eval, Care Plan, and Patient Education for further information regarding physical therapy discharge summary and plan of care.     Patient return to room at end of treatment and remained up in wheelchair with needs in reach.     Isaura Priest, PT  1/5/2018

## 2018-01-05 NOTE — PROGRESS NOTES
End Of Shift Functional Summary, Nursing      TOILETING:  Does patient need assist with clothing management and/or pericare? No    TOILET TRANSFER:  Pt requires modified independent. Pt uses grab bars. BLADDER:  Pt does not have a muniz catheter that staff manages. Pt does take medication. Pt is continent. of bladder and voids in toilet  Pt has had 0 bladder accidents during this shift.  (An accident is when the episode is not contained in a brief AND/OR the clothing/linen requires changing/cleaning up.)    BOWEL:  Pt does take medication. Pt is continent of bowel and uses toilet. Pt has had 0 bowel accidents during this shift. (An accident is when the episode is not contained in a brief AND/OR the clothing/linen requires changing/cleaning up.)    BED/CHAIR TRANSFER  Pt is modified independent in room. Patient requires the assistance of 0 staff member(s). Pt uses walker    BATHING  Pt requires extra time. EATING  Pt requires no assistance. Pt does not wear dentures. TUBE FEEDINGS:  Pt does not  receive nutrition through tube feedings. Patient requires no assistance with feedings. GROOMING  Pt requires extra time    Documentation reviewed and plan of care discussed/reviewed with   patient during the shift.

## 2018-01-05 NOTE — PROGRESS NOTES
Pt discharge teaching completed with pt and spouse. Gave pt handouts and answered questions. Prescriptions given to pt. Pt discharged and left floor via wheelchair with this nurse.

## 2018-01-05 NOTE — PROGRESS NOTES
Care Management Interventions  PCP Verified by CM: Yes ([address])  Mode of Transport at Discharge: Other (see comment) (family car)  Transition of Care Consult (CM Consult): Other (Outpatient PT)  Discharge Durable Medical Equipment: Yes ( WC and Cushion    [employer])  Physical Therapy Consult: Yes  Occupational Therapy Consult: Yes  Current Support Network: Lives with Spouse, Family Lives [place], Own Home [phone])  Confirm Follow Up Transport: Family  Plan discussed with Pt/Family/Caregiver: Yes  Freedom of Choice Offered: Yes  Discharge Location  Discharge Placement: Home with outpatient services ([facility])    Pt discharged to home with spouse and outpatient PT. Paperwork given to patient to complete prior to therapy appointment. Provided pt with application and rx for disabled parking placard.

## 2018-01-05 NOTE — PROGRESS NOTES
Care Management Interventions  PCP Verified by CM: Yes ([address])  Transition of Care Consult (CM Consult): Discharge Planning  Physical Therapy Consult: Yes  Occupational Therapy Consult: Yes  Current Support Network: Lives with Spouse, Family Lives [place], Own Home [phone])  Plan discussed with Pt/Family/Caregiver: Yes  Freedom of Choice Offered: Yes  Discharge Location  Discharge Placement: Home with family assistance (and outpatietnt PT)     Pt admitted from [facility] - admitted for acute cauda equina syndrome - S/P L4 - L5 partial hemilaminetomy, discectomy and foraminotomy for decompression of nerves. Pt is typical independent with self care, ambulating, driving and working fulltime. Pt lives at home with spouse and 3 children. Lives in a one level home with no steps to enter. Does not have home health and has not utilized home health or outpatient therapy. She has [facility] approved  5 days with update due on 1/2 to [address] [phone]  Fax [phone]. Pt plans to return home at discharge with assistance from family. Mother can be with her when spouse is working. Will follow for dc planning and case management needs. Clinical update faxed to insurance company requesting additional rehab days.

## 2018-01-05 NOTE — PROGRESS NOTES
Pt resting quietly in bed. Rates back pain 7/10 at this time. Administering Norco.  Alert and oriented x4. Lungs sounds clear bilaterally with respirations even and unlabored. Bowel sounds active in all quadrants. +2 pulses bilaterally in upper and lower extremities. Instructed to call for assistance. Call light in reach. Voiced understanding and identified call light.

## 2018-01-05 NOTE — PROGRESS NOTES
OT DISCHARGE REPORT    Time In: 0830  Time Out: 0580    COMPREHENSION MODE Initial Assessment Discharge Assessment 1/5/2018   Score 7 7     EXPRESSION Initial Assessment Discharge Assessment 1/5/2018   Primary Mode of Expression Verbal Verbal   Score 7 7   Comments         SOCIAL INTERACTION/ PRAGMATICS Initial Assessment Discharge Assessment 1/5/2018   Score 7 7   Comments friendly, active in conversation       PROBLEM SOLVING Initial Assessment Discharge Assessment 1/5/2018   Score 7 7   Comments         MEMORY Initial Assessment Discharge Assessment 1/5/2018   Score 7 7   Comments         EATING Initial Assessment Discharge Assessment 1/5/2018   Functional Level 7 7   Comments         GROOMING Initial Assessment Discharge Assessment 1/5/2018   Functional Level 5 (set up assistance, seated for brushing teeth and hair) 7   Tasks completed by patient Brushed hair, Brushed teeth, Washed hands, Washed face Washed face; Washed hands;Brushed teeth;Brushed hair   Comments I       BATHING Initial Assessment Discharge Assessment 1/5/2018   Functional Level 5 6   Body parts patient bathed Abdomen, Arm, left, Arm, right, Buttocks, Chest, Lower leg and foot, left, Lower leg and foot, right, [place] area, Thigh, left, Thigh, right Abdomen;Arm, left;Arm, right;Buttocks; Chest;Lower leg and foot, left; Lower leg and foot, right;Sharyn area; Thigh, left; Thigh, right   Comments SBA while standing for bathing seated on tub transfer bench      TUB/SHOWER TRANSFER INDEPENDENCE Initial Assessment Discharge Assessment 1/5/2018   Score 4 6  Type of Shower: Shower  Adaptive  Equipment:Tub transfer bench and Wheelchair   Comments CGA WC <> tub transfer bench      UPPER BODY DRESSING/UNDRESSING Initial Assessment Discharge Assessment 1/5/2018   Functional Level 5 6   Items applied/Steps completed Bra (3 steps), Pullover (4 steps) Pullover (4 steps); Bra (3 steps)   Comments 7/7 steps completed by pt, set up assistance and extra timing gathered clothing at  level      LOWER BODY DRESSING/UNDRESSING Initial Assessment Discharge Assessment 1/5/2018   Functional Level 5 6   Items applied/Steps completed Shoe, right (1 step), Shoe, left (1 step), Sock, left (1 step), Sock, right (1 step), Underpants (3 steps), Elastic waist pants (3 steps) Elastic waist pants (3 steps); Sock, left (1 step); Sock, right (1 step); Underpants (3 steps); Shoe, left (1 step); Shoe, right (1 step)   Comments 101/0 completed by patient, cuing for adhearing to spinal precautions,seated on TTB gathered clothing at [address] [place] [address]      TOILETING Initial Assessment Discharge Assessment 1/5/2018   Functional Level 6 6   Comments extra timing, use of GB for support       TOILET TRANSFER INDEPENDENCE Initial Assessment Discharge Assessment 1/5/2018   Transfer score 4 6   Comments CGA, use of GB       Plan of Care: Please see Care Plan for progress towards goals during stay  Precautions at Discharge: foot drop BLE  Discharge Location: home with assistance from    Safety/Supervision Recommendations/Functional Level:    Recommend assistance with IADL tasks   Family Training: NA     Recommended Continuing Therapy:  (no follow-up OT needs anticipated at this time )  Residual Deficits: impaired BLE strength and coordination; foot drop BLE   Progress over LOS: Patient made excellent progress with ADL skills, functional transfers, functional mobility, activity tolerance, standing tolerance, and strength. Summary of Session: Patient up in [facility] on arrival to room. Agreeable to OT. Completed ADL; see above for FIMs. Patient has met all goals established on initial evaluation and will DC to home today. At this time no follow-up OT needs anticipated.      Rubia Hylton MS, OTR/L  1/5/2018

## 2018-02-16 NOTE — DISCHARGE SUMMARY
Discharge Summary     Patient ID:  Oziel Bolivar  [id number]   38 y.o.  1980    Admit date: 12/23/2017    Discharge Date: 12/29/2018     Admitting Physician: Marvin Martinez MD     Discharge Physician: Marvin Martinez MD    Admission Diagnoses: L4-5 HNP WITH CAUDA EQUINA SYNDROME    Last Procedure: Procedure(s):  LEFT L4-5 DISCECTOMY    Discharge Diagnoses: Active Problems:    Cauda equina syndrome with neurogenic bladder ([address] (12/23/2017)         Consults: Rehabilitation Medicine    Significant Diagnostic Studies: MRI L-spine     Hospital Course:   Normal hospital course for this procedure. At the time of transfer to Rehab, she was already gaining back some neurologic function in the distal BLEs. Disposition: Rehab    Patient Instructions:   Cannot display discharge medications since this patient is not currently admitted. Diet: Reference my discharge instructions. Activity: Reference my discharge instructions. No orders of the defined types were placed in this encounter. Total time discharging patient took greater than 30 minutes.     Signed:  Marvin Martinez MD  February 16, 2018  6:00 PM

## 2019-07-17 NOTE — PROGRESS NOTES
MD Caitlyn,   Medical Director  [address] [place] [address], [address] [facility]  Tel: [phone]       [facility] PROGRESS NOTE    Jazmine Silverman Date: 12/29/2017    Chief Complaint : Gait dysfunction secondary to below. Admit Diagnosis: spinal stenosis; Incomplete paraplegia (HCC)  Neurogenic bowel and bladder  Acute Rehab Dx:  Gait impairment/ gait dysfunction  Debility    deconditioning  Mobility and ambulation deficits  Self Care/ADL deficits     Subjective     Patient seen and examined. Vss. Afebrile. Patient voiding at will, appropriate volumes. PVRs are minimal to none each time. States she feels she had control, no accidents. BM x 2 since admission. Dick[place] she had urge and went in toilet.      Objective:     Current Facility-Administered Medications   Medication Dose Route Frequency    acetaminophen (TYLENOL) tablet 650 mg  650 mg Oral Q4H PRN    famotidine (PEPCID) tablet 20 mg  20 mg Oral Q12H    HYDROcodone-acetaminophen (NORCO)  mg tablet 1 Tab  1 Tab Oral Q4H PRN    senna-docusate (PERICOLACE) 8.6-50 mg per tablet 2 Tab  2 Tab Oral DAILY    heparin (porcine) injection 5,000 Units  5,000 Units SubCUTAneous Q12H    alum-mag hydroxide-simeth (MYLANTA) oral suspension 30 mL  30 mL Oral Q4H PRN    bisacodyl (DULCOLAX) suppository 10 mg  10 mg Rectal DAILY PRN    DULoxetine (CYMBALTA) capsule 60 mg  60 mg Oral QHS    lactulose (CHRONULAC) solution 30 g  30 g Oral PRN    magnesium hydroxide (MILK OF MAGNESIA) 400 mg/5 mL oral suspension 30 mL  30 mL Oral DAILY PRN    ondansetron (ZOFRAN ODT) tablet 8 mg  8 mg Oral Q6H PRN    polyethylene glycol (MIRALAX) packet 17 g  17 g Oral DAILY    simethicone (MYLICON) tablet 80 mg  80 mg Oral QID PRN    sodium phosphate (FLEET'S) enema 118 mL  1 Enema Rectal PRN    tamsulosin (FLOMAX) capsule 0.4 mg  0.4 mg Oral DAILY    temazepam (RESTORIL) capsule 30 mg  30 mg Oral QHS PRN    traMADol (ULTRAM) tablet 50 mg 50 mg Oral Q6H PRN    fluconazole (DIFLUCAN) tablet 200 mg  200 mg Oral DAILY     Review of Systems:Denies chest pain, shortness of breath, cough, headache, visual problems, abdominal pain, dysurea, calf pain. Pertinent positives are as noted in the medical records and unremarkable otherwise. Visit Vitals    /70 (BP 1 Location: Left arm)    Pulse 90    Temp 98.2 °F (36.8 °C)    Resp 16    SpO2 96%        Physical Exam:   General: Alert and age appropriately oriented. No acute cardio respiratory distress. HEENT: Normocephalic,   no scleral icterus  Oral mucosa moist without cyanosis   Lungs: Clear to auscultation  bilaterally. Respiration even and unlabored   Heart: Regular rate and rhythm, S1, S2   No  murmurs, clicks, rub or gallops   Abdomen: Soft, non-tender, nondistended. Bowel sounds present. No organomegaly. Genitourinary: defered   Neuromuscular:         LLE     Hip flexion:   5/5              Knee extension:  5 /5    Ankle dorsiflexion:   1+/5   EHL:   0/5   Ankle plantarflexion:   5-/5        RLE     Hip flexion:  5- /5   Knee extension:   5/5    Ankle dorsiflexion:  1 /5   EHL:   0/5   Ankle plantarflexion:   5-/5  Sensory - + areas of LLE, RLE sensory deificts      Skin/extremity: No rashes, no erythema. No calf tenderness BLE  Wound covered.  Back incision C/D/I                                                                            Functional Assessment:          Balance  Sitting - Static: Good (unsupported) (12/30/17 1200)  Sitting - Dynamic: Good (unsupported) (12/30/17 1200)  Standing - Static: Fair (12/30/17 1200)                     [place] Fall Risk Assessment:  [place] Fall Risk  Mobility: Ambulates or transfers with assist devices or assistance/unsteady gait (12/31/17 0720)  Mobility Interventions: Assess mobility with egress test;Patient to call before getting OOB (12/31/17 0720)  Mentation: Alert, oriented x 3 (12/31/17 0720)  Medication: Patient receiving anticonvulsants, sedatives(tranquilizers), psychotropics or hypnotics, hypoglycemics, narcotics, sleep aids, antihypertensives, laxatives, or diuretics (12/31/17 0720)  Medication Interventions: Evaluate medications/consider consulting pharmacy; Patient to call before getting OOB (12/31/17 0720)  Elimination: Needs assistance with toileting (12/31/17 0720)  Elimination Interventions: Call light in reach; Patient to call for help with toileting needs (12/31/17 0720)  Prior Fall History: No (12/31/17 0720)  Total Score: 3 (12/31/17 0720)  Standard Fall Precautions: Yes (12/30/17 2040)  High Fall Risk: Yes (12/31/17 0720)     Speech Assessment:         Ambulation:  Gait  Distance (ft): 100 Feet (ft) (100ft x 2) (12/30/17 1200)  Assistive Device: Walker, rolling;Gait belt; Other (comment) (bilateral ace wraps for DF assist) (12/30/17 1200)     Labs/Studies:  Recent Results (from the past 72 hour(s))   CULTURE, URINE    Collection Time: 12/29/17  9:59 PM   Result Value Ref Range    Special Requests: NO SPECIAL REQUESTS      Culture result: NO GROWTH 1 DAY     URINALYSIS W/ RFLX MICROSCOPIC    Collection Time: 12/29/17  9:59 PM   Result Value Ref Range    Color YELLOW      Appearance CLOUDY      Specific gravity 1.014 1.001 - 1.023      pH (UA) 7.0 5.0 - 9.0      Protein NEGATIVE  NEG mg/dL    Glucose NEGATIVE  mg/dL    Ketone NEGATIVE  NEG mg/dL    Bilirubin NEGATIVE  NEG      Blood NEGATIVE  NEG      Urobilinogen 0.2 0.2 - 1.0 EU/dL    Nitrites NEGATIVE  NEG      Leukocyte Esterase NEGATIVE  NEG     CBC W/O DIFF    Collection Time: 12/30/17  7:17 AM   Result Value Ref Range    WBC 10.6 4.3 - 11.1 K/uL    RBC 3.72 (L) 4.05 - 5.25 M/uL    HGB 11.9 11.7 - 15.4 g/dL    HCT 36.8 35.8 - 46.3 %    MCV 98.9 (H) 79.6 - 97.8 FL    MCH 32.0 26.1 - 32.9 PG    MCHC 32.3 31.4 - 35.0 g/dL    RDW 12.5 11.9 - 14.6 %    PLATELET 585 184 - 190 K/uL    MPV 9.7 (L) 10.8 - 88.8 FL   METABOLIC PANEL, COMPREHENSIVE    Collection Time: 12/30/17 7: 17 AM   Result Value Ref Range    Sodium 139 136 - 145 mmol/L    Potassium 4.0 3.5 - 5.1 mmol/L    Chloride 103 98 - 107 mmol/L    CO2 30 21 - 32 mmol/L    Anion gap 6 (L) 7 - 16 mmol/L    Glucose 94 65 - 100 mg/dL    BUN 20 6 - 23 MG/DL    Creatinine 0.62 0.6 - 1.0 MG/DL    GFR est AA >60 >60 ml/min/1.73m2    GFR est non-AA >60 >60 ml/min/1.73m2    Calcium 8.8 8.3 - 10.4 MG/DL    Bilirubin, total 0.3 0.2 - 1.1 MG/DL    ALT (SGPT) 117 (H) 12 - 65 U/L    AST (SGOT) 35 15 - 37 U/L    Alk. phosphatase 99 50 - 136 U/L    Protein, total 7.1 6.3 - 8.2 g/dL    Albumin 3.1 (L) 3.5 - 5.0 g/dL    Globulin 4.0 (H) 2.3 - 3.5 g/dL    A-G Ratio 0.8 (L) 1.2 - 3.5         Assessment:     Problem List as of 12/31/2017  Date Reviewed: 12/23/2017          Codes Class Noted - Resolved    Incomplete paraplegia [facility]) ICD-10-CM: G82.22  ICD-9-CM: 344.1  12/29/2017 - Present        Cauda equina syndrome with neurogenic bladder [facility]) ICD-10-CM: G83.4  ICD-9-CM: 344.61  12/23/2017 - Present              Assessment/ plan:         Assessment:L4-5 disc herniation with extrusion with severe narrowing and compression. s/p left L4-5 partial hemilaminectomy, discectomy, and foraminotomy for decompression of nerves due to acute cauda equina syndrome. This was performed on 12/23,      The Post Assessment Physician Evaluation (AMARILIS) found the current functional status to be comparable with the Pre-admission Screening. The Patient is a good candidate for acute inpatient rehabilitation. Nothing since the Pre-admission screen has changed that determination.      Rehabilitation Plan  The patient has shown the ability to tolerate and benefit from 3 hours of therapy daily and is being admitted to a comprehensive acute inpatient rehabilitation program consisting of at least 3 hours of combined physical and occupational therapies.   Begin intensive Physical Therapy for a minimum of 1.5 hours a day, at least 5 out of 7 days per week to address bed mobility, transfers, ambulation, strengthening, balance, and endurance. Focus on LE strength funciton. Begin intensive Occupational Therapy for a minimum of 1.5 hours a day, at least 5 out of 7 days per week to address ADL ( bathing, LE dressing, toileting) and adaptive equipment as needed.     The patient will also require 24-hour skilled rehabilitation nursing for bowel and bladder management, skin care for decubitus ulcer prevention , pain management and ongoing medication administration      The patient may benefit from a psychology consult for depression, adjustment disorder.         Continue daily physician medical management:  Pneumonia prophylaxis- Incentive spirometer every hour while awake     DVT risk / DVT Prophylaxis- Will require daily physician exam to assess for signs and symptoms as patient is at increased risk for of thromboembolism. Mobilization as tolerated. Intermittent pneumatic compression devices when in bed Thigh-high or knee-high thromboembolic deterrent hose when out of bed. -  started, continued on Heparin 5000 bid     Pain Control: stable, mild-to-moderate joint symptoms intermittently, reasonably well controlled by PRN meds. Will require regular pain assessment and comprenhensive pain management,   - no new pain source. Needing few prn norco /day for surgical back pain.       Wound Care: Monitor wound status daily per staff and physician. At risk for failure. Will require 24/7 rehab nursing. Keep wound clean and dry     Hypotension; teds prn;   - watch orthostatics. Asymptomatic at PT.       Hx of depression; cont Cymbalta; has been coping very well     Bilateral foot drop; neuro deficits appear to be mostly at L4., L5 roots. - will need maria esther AFOs,.cont. DF assist for PT.       Urinary retention/ neurogenic bladder -  -PVRs minimal to none. Voiding spontaneously. No accidents. Will PVR daily. Monitor.   - UA -neg.   - added Flomax at admission. Genital candidiasis. -  Diflucan.  treat for 3d oral diflucan    bowel program - cont Miralax, pericolace, daily suppos/ prn lactulose, fleets. Had mod BM yesterday and today. Denies soiling.      GERD - resume PPI. At times may need additional antacids, Maalox prn. Time spent was 25 minutes with over 1/2 in direct patient care/examination, consultation and coordination of care.      Signed By: Christo Bull MD     December 31, 2017 Detail Level: Detailed

## (undated) DEVICE — 1010 S-DRAPE TOWEL DRAPE 10/BX: Brand: STERI-DRAPE™

## (undated) DEVICE — REM POLYHESIVE ADULT PATIENT RETURN ELECTRODE: Brand: VALLEYLAB

## (undated) DEVICE — SOLUTION IV 1000ML 0.9% SOD CHL

## (undated) DEVICE — BAND RUB 1/8X2.5IN STRL --

## (undated) DEVICE — PENCIL ES L12IN BAYNT MPLR DISP BOVIE

## (undated) DEVICE — SYR 10ML LUER LOK 1/5ML GRAD --

## (undated) DEVICE — 2000CC GUARDIAN II: Brand: GUARDIAN

## (undated) DEVICE — WAX SURG 2.5GM HEMSTAT BNE BEESWAX PARAFFIN ISO PALMITATE

## (undated) DEVICE — NEEDLE SPNL 22GA L3.5IN BLK HUB S STL REG WALL FIT STYL W/

## (undated) DEVICE — SURGICAL PROCEDURE PACK MIN CV CDS

## (undated) DEVICE — TOOL T12MH25 LEGEND 12CM 2.5MM MH: Brand: MIDAS REX ™

## (undated) DEVICE — KNIFE SURG L134MM SHT BAYNT

## (undated) DEVICE — INTENDED FOR TISSUE SEPARATION, AND OTHER PROCEDURES THAT REQUIRE A SHARP SURGICAL BLADE TO PUNCTURE OR CUT.: Brand: BARD-PARKER SAFETY BLADES SIZE 15, STERILE

## (undated) DEVICE — DERMABOND SKIN ADH 0.7ML -- DERMABOND ADVANCED 12/BX

## (undated) DEVICE — SUTURE VCRL SZ 3-0 L18IN ABSRB VLT L26MM SH 1/2 CIR J774D

## (undated) DEVICE — DRAPE SHT 3 QTR PROXIMA 53X77 --

## (undated) DEVICE — DRAPE TWL SURG 16X26IN BLU ORB04] ALLCARE INC]

## (undated) DEVICE — KENDALL SCD EXPRESS SLEEVES, KNEE LENGTH, MEDIUM: Brand: KENDALL SCD

## (undated) DEVICE — DRAPE MICSCP W51XL150IN FOR LEICA M680 WILD OHS

## (undated) DEVICE — (D)PREP SKN CHLRAPRP APPL 26ML -- CONVERT TO ITEM 371833